# Patient Record
Sex: MALE | Race: OTHER | NOT HISPANIC OR LATINO | URBAN - METROPOLITAN AREA
[De-identification: names, ages, dates, MRNs, and addresses within clinical notes are randomized per-mention and may not be internally consistent; named-entity substitution may affect disease eponyms.]

---

## 2021-01-23 ENCOUNTER — EMERGENCY (EMERGENCY)
Facility: HOSPITAL | Age: 56
LOS: 0 days | Discharge: HOME | End: 2021-01-24
Attending: STUDENT IN AN ORGANIZED HEALTH CARE EDUCATION/TRAINING PROGRAM
Payer: COMMERCIAL

## 2021-01-23 VITALS
RESPIRATION RATE: 20 BRPM | HEIGHT: 74 IN | HEART RATE: 93 BPM | SYSTOLIC BLOOD PRESSURE: 151 MMHG | TEMPERATURE: 99 F | DIASTOLIC BLOOD PRESSURE: 93 MMHG | OXYGEN SATURATION: 100 % | WEIGHT: 225.09 LBS

## 2021-01-23 DIAGNOSIS — N10 ACUTE PYELONEPHRITIS: ICD-10-CM

## 2021-01-23 DIAGNOSIS — E11.9 TYPE 2 DIABETES MELLITUS WITHOUT COMPLICATIONS: ICD-10-CM

## 2021-01-23 DIAGNOSIS — I10 ESSENTIAL (PRIMARY) HYPERTENSION: ICD-10-CM

## 2021-01-23 DIAGNOSIS — M54.9 DORSALGIA, UNSPECIFIED: ICD-10-CM

## 2021-01-23 PROCEDURE — 93010 ELECTROCARDIOGRAM REPORT: CPT

## 2021-01-23 PROCEDURE — 99285 EMERGENCY DEPT VISIT HI MDM: CPT

## 2021-01-23 NOTE — ED PROVIDER NOTE - NSFOLLOWUPINSTRUCTIONS_ED_ALL_ED_FT
Urinary Tract Infection    A urinary tract infection (UTI) is an infection of any part of the urinary tract, which includes the kidneys, ureters, bladder, and urethra. Risk factors include ignoring your need to urinate, wiping back to front if female, being an uncircumcised male, and having diabetes or a weak immune system. Symptoms include frequent urination, pain or burning with urination, foul smelling urine, cloudy urine, pain in the lower abdomen, blood in the urine, and fever. If you were prescribed an antibiotic medicine, take it as told by your health care provider. Do not stop taking the antibiotic even if you start to feel better.    SEEK IMMEDIATE MEDICAL CARE IF YOU HAVE ANY OF THE FOLLOWING SYMPTOMS: severe back or abdominal pain, fever, inability to keep fluids or medicine down, dizziness/lightheadedness, or a change in mental status.     Take acetaminophen 650 mg orally every 6-8 hours for pain control as needed. Please do not exceed 4,000 mg of acetaminophen during a 24 hours period. Acetaminophen can be found in many over-the-counter cold medications as well as opioid medications that may be given for pain.    Take ibuprofen (also known as MOTRIN or ADVIL) 400 mg orally every 6-8 hours for pain control as needed with food to avoid an upset stomach. Ibuprofen can be found in many over-the-counter medications. Please do not take ibuprofen if you have a bleeding disorder, stomach or gastrointestinal ulcer, or liver disease.    If needed, you can alternate these medications so that you can take one medication every 3 hours. For example, at noon take ibuprofen, then at 3PM take acetaminophen, then at 6PM take ibuprofen.     Please fill the prescription of the antibiotics and take as directed. Please finish the entire course of the medication as prescribed. Do not use any alcohol or grapefruit juice with any antibiotics.     Rest, drink plenty of fluids.  Advance activity as tolerated.  Continue all previously prescribed medications as directed.  Follow up with your PMD 2-3 days and bring copies of your results.

## 2021-01-23 NOTE — ED PROVIDER NOTE - CARE PROVIDER_API CALL
Luís Mackay)  Urology  [address] [facility], [address] [place] [address]  Phone: [phone]  Fax: [phone]  Follow Up Time:

## 2021-01-23 NOTE — ED PROVIDER NOTE - PATIENT PORTAL LINK FT
You can access the FollowMyHealth Patient Portal offered by [facility] by registering at the following website: [email or web address]. By joining [facility]’s FollowMyHealth portal, you will also be able to view your health information using other applications (apps) compatible with our system.

## 2021-01-23 NOTE — ED PROVIDER NOTE - OBJECTIVE STATEMENT
Contrary to Triage note, patient's EKG in the ED was NSR as well as outpatient urgent care EKGs reviewed.    55M PMHX of DM, HTN presenting with right flank pain x few days. Contrary to Triage note, patient's EKG in the ED was NSR as well as outpatient urgent care EKGs reviewed.    55M PMHX of DM, HTN presenting with right flank pain x few days. Was sent by urgent care for "abnormal EKG", reviewed outpatient EKGs which was NSR. In the ED, patient with unremarkable EKG. Describes right sided flank pain, mild, achy, nonradiating, that is intermittent in duration. a/w urinary frequency. Denies any chest pain,, shortness of breath,, abdominal pain, fever/chills, nausea/vomiting,, diarrhea/melena/constipation, trauma, falls, hematuria, dysuria, known hx of renal stones.

## 2021-01-23 NOTE — ED ADULT TRIAGE NOTE - CHIEF COMPLAINT QUOTE
Pt biba from urgent care for abnormal EKG. PT C/O frequent urination and R lower back pain x today. h/o DM, HTN, hypothyroidism
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
## 2021-01-23 NOTE — ED PROVIDER NOTE - PROGRESS NOTE DETAILS
The patient is hemodynamically stable, clinically well-appearing, mentating well, and ambulatory for discharge home. CT showing cystitis/pyelo, but UA only showing blood but now LE/nitrates or WBC. Labs showing normal WBC, elevated glucose 334, mild hypokalemia of 3.2, normal HCO3 25 and no gap. Treated w/ ceftriaxone and rx cefpodoxime for home. discussed with patient about return precautions. verbalizes understanding.

## 2021-01-23 NOTE — ED PROVIDER NOTE - PHYSICAL EXAMINATION
VITAL SIGNS: I have reviewed nursing notes and confirm.   GEN: Well-developed; well-nourished; in no acute distress. Speaking full sentences.  SKIN: Warm, pink, no rash, no diaphoresis, no cyanosis, well perfused.   HEAD: Normocephalic; atraumatic. No scalp lacerations, no abrasions.  NECK: Supple; non tender.   EYES: Pupils 3mm equal, round, reactive to light and accomodation, conjunctiva and sclera clear. Extra-ocular movements intact bilaterally.  ENT: No nasal discharge; airway clear. Trachea is midline. ORAL: No oropharyngeal exudates or erythema. Normal dentition.  CV: Regular rate and rhythm. S1, S2 normal; no murmurs, gallops, or rubs. No lower extremity pitting edema bilaterally. Capillary refill < 2 seconds throughout. Distal pulses intact 2+ throughout.  RESP: CTA bilaterally. No wheezes, rales, or rhonchi.   ABD: Normal bowel sounds, soft, non-distended, non-tender, no hepatosplenomegaly. (+) R CVA tenderness mild.   MSK: Normal range of motion and movement of all 4 extremities. No joint or muscular pain throughout. No clubbing.   BACK: No thoracolumbar midline or paravertebral tenderness. No step-offs or obvious deformities.  NEURO: Alert & oriented x 3, Grossly unremarkable. Sensory and motor intact throughout. No focal deficits. Gait: Fluid. Normal speech and coordination.   PSYCH: Cooperative, appropriate.

## 2021-01-24 VITALS
DIASTOLIC BLOOD PRESSURE: 79 MMHG | OXYGEN SATURATION: 100 % | SYSTOLIC BLOOD PRESSURE: 166 MMHG | HEART RATE: 69 BPM | RESPIRATION RATE: 20 BRPM | TEMPERATURE: 98 F

## 2021-01-24 LAB
ALBUMIN SERPL ELPH-MCNC: 3.3 G/DL — SIGNIFICANT CHANGE UP (ref 3.3–5)
ALP SERPL-CCNC: 60 U/L — SIGNIFICANT CHANGE UP (ref 40–120)
ALT FLD-CCNC: 42 U/L — SIGNIFICANT CHANGE UP (ref 12–78)
ANION GAP SERPL CALC-SCNC: 9 MMOL/L — SIGNIFICANT CHANGE UP (ref 5–17)
APPEARANCE UR: CLEAR — SIGNIFICANT CHANGE UP
AST SERPL-CCNC: 43 U/L — HIGH (ref 15–37)
BASOPHILS # BLD AUTO: 0.01 K/UL — SIGNIFICANT CHANGE UP (ref 0–0.2)
BASOPHILS NFR BLD AUTO: 0.2 % — SIGNIFICANT CHANGE UP (ref 0–2)
BILIRUB SERPL-MCNC: 0.4 MG/DL — SIGNIFICANT CHANGE UP (ref 0.2–1.2)
BILIRUB UR-MCNC: NEGATIVE — SIGNIFICANT CHANGE UP
BUN SERPL-MCNC: 11 MG/DL — SIGNIFICANT CHANGE UP (ref 7–23)
CALCIUM SERPL-MCNC: 8.4 MG/DL — LOW (ref 8.5–10.1)
CHLORIDE SERPL-SCNC: 99 MMOL/L — SIGNIFICANT CHANGE UP (ref 96–108)
CO2 SERPL-SCNC: 25 MMOL/L — SIGNIFICANT CHANGE UP (ref 22–31)
COLOR SPEC: YELLOW — SIGNIFICANT CHANGE UP
CREAT SERPL-MCNC: 1.05 MG/DL — SIGNIFICANT CHANGE UP (ref 0.5–1.3)
DIFF PNL FLD: ABNORMAL
EOSINOPHIL # BLD AUTO: 0 K/UL — SIGNIFICANT CHANGE UP (ref 0–0.5)
EOSINOPHIL NFR BLD AUTO: 0 % — SIGNIFICANT CHANGE UP (ref 0–6)
EPI CELLS # UR: SIGNIFICANT CHANGE UP
GLUCOSE SERPL-MCNC: 334 MG/DL — HIGH (ref 70–99)
GLUCOSE UR QL: 1000 MG/DL
HCT VFR BLD CALC: 38.3 % — LOW (ref 39–50)
HGB BLD-MCNC: 13.6 G/DL — SIGNIFICANT CHANGE UP (ref 13–17)
IMM GRANULOCYTES NFR BLD AUTO: 0.4 % — SIGNIFICANT CHANGE UP (ref 0–1.5)
INR BLD: 1.32 RATIO — HIGH (ref 0.88–1.16)
KETONES UR-MCNC: ABNORMAL
LEUKOCYTE ESTERASE UR-ACNC: NEGATIVE — SIGNIFICANT CHANGE UP
LYMPHOCYTES # BLD AUTO: 0.86 K/UL — LOW (ref 1–3.3)
LYMPHOCYTES # BLD AUTO: 19.1 % — SIGNIFICANT CHANGE UP (ref 13–44)
MCHC RBC-ENTMCNC: 30.6 PG — SIGNIFICANT CHANGE UP (ref 27–34)
MCHC RBC-ENTMCNC: 35.5 GM/DL — SIGNIFICANT CHANGE UP (ref 32–36)
MCV RBC AUTO: 86.1 FL — SIGNIFICANT CHANGE UP (ref 80–100)
MONOCYTES # BLD AUTO: 0.43 K/UL — SIGNIFICANT CHANGE UP (ref 0–0.9)
MONOCYTES NFR BLD AUTO: 9.6 % — SIGNIFICANT CHANGE UP (ref 2–14)
NEUTROPHILS # BLD AUTO: 3.18 K/UL — SIGNIFICANT CHANGE UP (ref 1.8–7.4)
NEUTROPHILS NFR BLD AUTO: 70.7 % — SIGNIFICANT CHANGE UP (ref 43–77)
NITRITE UR-MCNC: NEGATIVE — SIGNIFICANT CHANGE UP
NRBC # BLD: 0 /100 WBCS — SIGNIFICANT CHANGE UP (ref 0–0)
PH UR: 6.5 — SIGNIFICANT CHANGE UP (ref 5–8)
PLATELET # BLD AUTO: 187 K/UL — SIGNIFICANT CHANGE UP (ref 150–400)
POTASSIUM SERPL-MCNC: 3.2 MMOL/L — LOW (ref 3.5–5.3)
POTASSIUM SERPL-SCNC: 3.2 MMOL/L — LOW (ref 3.5–5.3)
PROT SERPL-MCNC: 7.5 GM/DL — SIGNIFICANT CHANGE UP (ref 6–8.3)
PROT UR-MCNC: 30 MG/DL
PROTHROM AB SERPL-ACNC: 15.1 SEC — HIGH (ref 10.6–13.6)
RBC # BLD: 4.45 M/UL — SIGNIFICANT CHANGE UP (ref 4.2–5.8)
RBC # FLD: 11.9 % — SIGNIFICANT CHANGE UP (ref 10.3–14.5)
RBC CASTS # UR COMP ASSIST: SIGNIFICANT CHANGE UP /HPF (ref 0–4)
SODIUM SERPL-SCNC: 133 MMOL/L — LOW (ref 135–145)
SP GR SPEC: 1.01 — SIGNIFICANT CHANGE UP (ref 1.01–1.02)
UROBILINOGEN FLD QL: NEGATIVE MG/DL — SIGNIFICANT CHANGE UP
WBC # BLD: 4.5 K/UL — SIGNIFICANT CHANGE UP (ref 3.8–10.5)
WBC # FLD AUTO: 4.5 K/UL — SIGNIFICANT CHANGE UP (ref 3.8–10.5)
WBC UR QL: SIGNIFICANT CHANGE UP

## 2021-01-24 PROCEDURE — 74176 CT ABD & PELVIS W/O CONTRAST: CPT | Mod: 26,MA

## 2021-01-24 RX ORDER — KETOROLAC TROMETHAMINE 30 MG/ML
15 SYRINGE (ML) INJECTION ONCE
Refills: 0 | Status: DISCONTINUED | OUTPATIENT
Start: 2021-01-24 | End: 2021-01-24

## 2021-01-24 RX ORDER — CEFPODOXIME PROXETIL 100 MG
1 TABLET ORAL
Qty: 20 | Refills: 0
Start: 2021-01-24 | End: 2021-02-02

## 2021-01-24 RX ORDER — SODIUM CHLORIDE 9 MG/ML
1000 INJECTION INTRAMUSCULAR; INTRAVENOUS; SUBCUTANEOUS ONCE
Refills: 0 | Status: COMPLETED | OUTPATIENT
Start: 2021-01-24 | End: 2021-01-24

## 2021-01-24 RX ORDER — CEFTRIAXONE 500 MG/1
1000 INJECTION, POWDER, FOR SOLUTION INTRAMUSCULAR; INTRAVENOUS ONCE
Refills: 0 | Status: COMPLETED | OUTPATIENT
Start: 2021-01-24 | End: 2021-01-24

## 2021-01-24 RX ORDER — ACETAMINOPHEN 500 MG
650 TABLET ORAL ONCE
Refills: 0 | Status: COMPLETED | OUTPATIENT
Start: 2021-01-24 | End: 2021-01-24

## 2021-01-24 RX ADMIN — Medication 15 MILLIGRAM(S): at 00:52

## 2021-01-24 RX ADMIN — CEFTRIAXONE 1000 MILLIGRAM(S): 500 INJECTION, POWDER, FOR SOLUTION INTRAMUSCULAR; INTRAVENOUS at 04:11

## 2021-01-24 RX ADMIN — Medication 650 MILLIGRAM(S): at 05:46

## 2021-01-24 RX ADMIN — CEFTRIAXONE 100 MILLIGRAM(S): 500 INJECTION, POWDER, FOR SOLUTION INTRAMUSCULAR; INTRAVENOUS at 03:41

## 2021-01-24 RX ADMIN — SODIUM CHLORIDE 1000 MILLILITER(S): 9 INJECTION INTRAMUSCULAR; INTRAVENOUS; SUBCUTANEOUS at 00:52

## 2021-01-24 NOTE — ED ADULT NURSE NOTE - OBJECTIVE STATEMENT
Pt sent over from urgent care for abnormal EKG. Pts EKG normal in ED. Pt A&OX4. Pt reports L back pain earlier today, frequent urination and dizziness. Pt denies pain currently.

## 2021-01-25 LAB
CULTURE RESULTS: SIGNIFICANT CHANGE UP
SPECIMEN SOURCE: SIGNIFICANT CHANGE UP

## 2021-04-08 PROBLEM — Z00.00 ENCOUNTER FOR PREVENTIVE HEALTH EXAMINATION: Status: ACTIVE | Noted: 2021-04-08

## 2022-08-20 ENCOUNTER — INPATIENT (INPATIENT)
Facility: HOSPITAL | Age: 57
LOS: 7 days | Discharge: HOME HEALTH | End: 2022-08-28
Attending: STUDENT IN AN ORGANIZED HEALTH CARE EDUCATION/TRAINING PROGRAM | Admitting: STUDENT IN AN ORGANIZED HEALTH CARE EDUCATION/TRAINING PROGRAM

## 2022-08-20 PROCEDURE — 99285 EMERGENCY DEPT VISIT HI MDM: CPT

## 2022-08-21 VITALS
RESPIRATION RATE: 17 BRPM | TEMPERATURE: 98 F | OXYGEN SATURATION: 100 % | HEIGHT: 74 IN | DIASTOLIC BLOOD PRESSURE: 85 MMHG | SYSTOLIC BLOOD PRESSURE: 144 MMHG | HEART RATE: 98 BPM

## 2022-08-21 DIAGNOSIS — E87.8 OTHER DISORDERS OF ELECTROLYTE AND FLUID BALANCE, NOT ELSEWHERE CLASSIFIED: ICD-10-CM

## 2022-08-21 DIAGNOSIS — Z95.1 PRESENCE OF AORTOCORONARY BYPASS GRAFT: Chronic | ICD-10-CM

## 2022-08-21 DIAGNOSIS — Z29.9 ENCOUNTER FOR PROPHYLACTIC MEASURES, UNSPECIFIED: ICD-10-CM

## 2022-08-21 DIAGNOSIS — L08.9 LOCAL INFECTION OF THE SKIN AND SUBCUTANEOUS TISSUE, UNSPECIFIED: ICD-10-CM

## 2022-08-21 DIAGNOSIS — E11.9 TYPE 2 DIABETES MELLITUS WITHOUT COMPLICATIONS: ICD-10-CM

## 2022-08-21 DIAGNOSIS — M86.172 OTHER ACUTE OSTEOMYELITIS, LEFT ANKLE AND FOOT: ICD-10-CM

## 2022-08-21 DIAGNOSIS — E78.5 HYPERLIPIDEMIA, UNSPECIFIED: ICD-10-CM

## 2022-08-21 DIAGNOSIS — Z89.422 ACQUIRED ABSENCE OF OTHER LEFT TOE(S): Chronic | ICD-10-CM

## 2022-08-21 DIAGNOSIS — Z95.1 PRESENCE OF AORTOCORONARY BYPASS GRAFT: ICD-10-CM

## 2022-08-21 DIAGNOSIS — M86.10 OTHER ACUTE OSTEOMYELITIS, UNSPECIFIED SITE: ICD-10-CM

## 2022-08-21 DIAGNOSIS — I10 ESSENTIAL (PRIMARY) HYPERTENSION: ICD-10-CM

## 2022-08-21 DIAGNOSIS — E03.9 HYPOTHYROIDISM, UNSPECIFIED: ICD-10-CM

## 2022-08-21 LAB
ANION GAP SERPL CALC-SCNC: 13 MMOL/L — SIGNIFICANT CHANGE UP (ref 7–14)
BASOPHILS # BLD AUTO: 0.03 K/UL — SIGNIFICANT CHANGE UP (ref 0–0.2)
BASOPHILS NFR BLD AUTO: 0.3 % — SIGNIFICANT CHANGE UP (ref 0–2)
BUN SERPL-MCNC: 12 MG/DL — SIGNIFICANT CHANGE UP (ref 7–23)
CALCIUM SERPL-MCNC: 9.5 MG/DL — SIGNIFICANT CHANGE UP (ref 8.4–10.5)
CHLORIDE SERPL-SCNC: 102 MMOL/L — SIGNIFICANT CHANGE UP (ref 98–107)
CO2 SERPL-SCNC: 25 MMOL/L — SIGNIFICANT CHANGE UP (ref 22–31)
CREAT SERPL-MCNC: 0.69 MG/DL — SIGNIFICANT CHANGE UP (ref 0.5–1.3)
CRP SERPL-MCNC: 86 MG/L — HIGH
EGFR: 107 ML/MIN/1.73M2 — SIGNIFICANT CHANGE UP
EOSINOPHIL # BLD AUTO: 0.16 K/UL — SIGNIFICANT CHANGE UP (ref 0–0.5)
EOSINOPHIL NFR BLD AUTO: 1.6 % — SIGNIFICANT CHANGE UP (ref 0–6)
ERYTHROCYTE [SEDIMENTATION RATE] IN BLOOD: 90 MM/HR — HIGH (ref 1–15)
FLUAV AG NPH QL: SIGNIFICANT CHANGE UP
FLUBV AG NPH QL: SIGNIFICANT CHANGE UP
GLUCOSE SERPL-MCNC: 149 MG/DL — HIGH (ref 70–99)
HCT VFR BLD CALC: 34.2 % — LOW (ref 39–50)
HGB BLD-MCNC: 11.2 G/DL — LOW (ref 13–17)
IANC: 7.1 K/UL — SIGNIFICANT CHANGE UP (ref 1.8–7.4)
IMM GRANULOCYTES NFR BLD AUTO: 0.3 % — SIGNIFICANT CHANGE UP (ref 0–1.5)
LYMPHOCYTES # BLD AUTO: 1.86 K/UL — SIGNIFICANT CHANGE UP (ref 1–3.3)
LYMPHOCYTES # BLD AUTO: 18.1 % — SIGNIFICANT CHANGE UP (ref 13–44)
MAGNESIUM SERPL-MCNC: 1.5 MG/DL — LOW (ref 1.6–2.6)
MCHC RBC-ENTMCNC: 29.8 PG — SIGNIFICANT CHANGE UP (ref 27–34)
MCHC RBC-ENTMCNC: 32.7 GM/DL — SIGNIFICANT CHANGE UP (ref 32–36)
MCV RBC AUTO: 91 FL — SIGNIFICANT CHANGE UP (ref 80–100)
MONOCYTES # BLD AUTO: 1.1 K/UL — HIGH (ref 0–0.9)
MONOCYTES NFR BLD AUTO: 10.7 % — SIGNIFICANT CHANGE UP (ref 2–14)
NEUTROPHILS # BLD AUTO: 7.1 K/UL — SIGNIFICANT CHANGE UP (ref 1.8–7.4)
NEUTROPHILS NFR BLD AUTO: 69 % — SIGNIFICANT CHANGE UP (ref 43–77)
NRBC # BLD: 0 /100 WBCS — SIGNIFICANT CHANGE UP (ref 0–0)
NRBC # FLD: 0 K/UL — SIGNIFICANT CHANGE UP (ref 0–0)
PHOSPHATE SERPL-MCNC: 3.7 MG/DL — SIGNIFICANT CHANGE UP (ref 2.5–4.5)
PLATELET # BLD AUTO: 329 K/UL — SIGNIFICANT CHANGE UP (ref 150–400)
POTASSIUM SERPL-MCNC: 3.4 MMOL/L — LOW (ref 3.5–5.3)
POTASSIUM SERPL-SCNC: 3.4 MMOL/L — LOW (ref 3.5–5.3)
RBC # BLD: 3.76 M/UL — LOW (ref 4.2–5.8)
RBC # FLD: 13.6 % — SIGNIFICANT CHANGE UP (ref 10.3–14.5)
RSV RNA NPH QL NAA+NON-PROBE: SIGNIFICANT CHANGE UP
SARS-COV-2 RNA SPEC QL NAA+PROBE: SIGNIFICANT CHANGE UP
SODIUM SERPL-SCNC: 140 MMOL/L — SIGNIFICANT CHANGE UP (ref 135–145)
WBC # BLD: 10.28 K/UL — SIGNIFICANT CHANGE UP (ref 3.8–10.5)
WBC # FLD AUTO: 10.28 K/UL — SIGNIFICANT CHANGE UP (ref 3.8–10.5)

## 2022-08-21 PROCEDURE — 99223 1ST HOSP IP/OBS HIGH 75: CPT

## 2022-08-21 PROCEDURE — 73630 X-RAY EXAM OF FOOT: CPT | Mod: 26,LT

## 2022-08-21 RX ORDER — FERROUS SULFATE 325(65) MG
325 TABLET ORAL DAILY
Refills: 0 | Status: DISCONTINUED | OUTPATIENT
Start: 2022-08-21 | End: 2022-08-28

## 2022-08-21 RX ORDER — METRONIDAZOLE 500 MG
500 TABLET ORAL EVERY 8 HOURS
Refills: 0 | Status: DISCONTINUED | OUTPATIENT
Start: 2022-08-21 | End: 2022-08-22

## 2022-08-21 RX ORDER — SEMAGLUTIDE 0.68 MG/ML
0 INJECTION, SOLUTION SUBCUTANEOUS
Qty: 0 | Refills: 0 | DISCHARGE

## 2022-08-21 RX ORDER — IBUPROFEN 200 MG
600 TABLET ORAL ONCE
Refills: 0 | Status: COMPLETED | OUTPATIENT
Start: 2022-08-21 | End: 2022-08-21

## 2022-08-21 RX ORDER — SENNA PLUS 8.6 MG/1
20 TABLET ORAL
Qty: 0 | Refills: 0 | DISCHARGE

## 2022-08-21 RX ORDER — LEVOTHYROXINE SODIUM 125 MCG
100 TABLET ORAL DAILY
Refills: 0 | Status: DISCONTINUED | OUTPATIENT
Start: 2022-08-21 | End: 2022-08-28

## 2022-08-21 RX ORDER — AMLODIPINE BESYLATE 2.5 MG/1
5 TABLET ORAL DAILY
Refills: 0 | Status: DISCONTINUED | OUTPATIENT
Start: 2022-08-21 | End: 2022-08-28

## 2022-08-21 RX ORDER — FOLIC ACID 0.8 MG
1 TABLET ORAL DAILY
Refills: 0 | Status: DISCONTINUED | OUTPATIENT
Start: 2022-08-21 | End: 2022-08-28

## 2022-08-21 RX ORDER — DEXTROSE 50 % IN WATER 50 %
25 SYRINGE (ML) INTRAVENOUS ONCE
Refills: 0 | Status: DISCONTINUED | OUTPATIENT
Start: 2022-08-21 | End: 2022-08-28

## 2022-08-21 RX ORDER — INSULIN LISPRO 100/ML
VIAL (ML) SUBCUTANEOUS
Refills: 0 | Status: DISCONTINUED | OUTPATIENT
Start: 2022-08-21 | End: 2022-08-21

## 2022-08-21 RX ORDER — FOLIC ACID 0.8 MG
1 TABLET ORAL
Qty: 0 | Refills: 0 | DISCHARGE

## 2022-08-21 RX ORDER — INSULIN LISPRO 100/ML
VIAL (ML) SUBCUTANEOUS
Refills: 0 | Status: DISCONTINUED | OUTPATIENT
Start: 2022-08-21 | End: 2022-08-28

## 2022-08-21 RX ORDER — DEXTROSE 50 % IN WATER 50 %
25 SYRINGE (ML) INTRAVENOUS ONCE
Refills: 0 | Status: DISCONTINUED | OUTPATIENT
Start: 2022-08-21 | End: 2022-08-21

## 2022-08-21 RX ORDER — FOLIC ACID 0.8 MG
0 TABLET ORAL
Qty: 0 | Refills: 0 | DISCHARGE

## 2022-08-21 RX ORDER — AMLODIPINE BESYLATE 2.5 MG/1
1 TABLET ORAL
Qty: 0 | Refills: 0 | DISCHARGE

## 2022-08-21 RX ORDER — LEVOTHYROXINE SODIUM 125 MCG
1 TABLET ORAL
Qty: 0 | Refills: 0 | DISCHARGE

## 2022-08-21 RX ORDER — SENNA PLUS 8.6 MG/1
2 TABLET ORAL AT BEDTIME
Refills: 0 | Status: DISCONTINUED | OUTPATIENT
Start: 2022-08-21 | End: 2022-08-28

## 2022-08-21 RX ORDER — INSULIN LISPRO 100/ML
VIAL (ML) SUBCUTANEOUS AT BEDTIME
Refills: 0 | Status: DISCONTINUED | OUTPATIENT
Start: 2022-08-21 | End: 2022-08-28

## 2022-08-21 RX ORDER — MAGNESIUM SULFATE 500 MG/ML
1 VIAL (ML) INJECTION ONCE
Refills: 0 | Status: COMPLETED | OUTPATIENT
Start: 2022-08-21 | End: 2022-08-21

## 2022-08-21 RX ORDER — SODIUM CHLORIDE 9 MG/ML
1000 INJECTION, SOLUTION INTRAVENOUS
Refills: 0 | Status: DISCONTINUED | OUTPATIENT
Start: 2022-08-21 | End: 2022-08-28

## 2022-08-21 RX ORDER — CEFEPIME 1 G/1
2000 INJECTION, POWDER, FOR SOLUTION INTRAMUSCULAR; INTRAVENOUS ONCE
Refills: 0 | Status: COMPLETED | OUTPATIENT
Start: 2022-08-21 | End: 2022-08-21

## 2022-08-21 RX ORDER — FERROUS SULFATE 325(65) MG
1 TABLET ORAL
Qty: 0 | Refills: 0 | DISCHARGE

## 2022-08-21 RX ORDER — DEXTROSE 50 % IN WATER 50 %
15 SYRINGE (ML) INTRAVENOUS ONCE
Refills: 0 | Status: DISCONTINUED | OUTPATIENT
Start: 2022-08-21 | End: 2022-08-21

## 2022-08-21 RX ORDER — ASPIRIN/CALCIUM CARB/MAGNESIUM 324 MG
81 TABLET ORAL DAILY
Refills: 0 | Status: DISCONTINUED | OUTPATIENT
Start: 2022-08-21 | End: 2022-08-28

## 2022-08-21 RX ORDER — ACETAMINOPHEN 500 MG
650 TABLET ORAL EVERY 6 HOURS
Refills: 0 | Status: DISCONTINUED | OUTPATIENT
Start: 2022-08-21 | End: 2022-08-24

## 2022-08-21 RX ORDER — VANCOMYCIN HCL 1 G
1000 VIAL (EA) INTRAVENOUS ONCE
Refills: 0 | Status: COMPLETED | OUTPATIENT
Start: 2022-08-21 | End: 2022-08-21

## 2022-08-21 RX ORDER — SODIUM CHLORIDE 9 MG/ML
1000 INJECTION, SOLUTION INTRAVENOUS
Refills: 0 | Status: DISCONTINUED | OUTPATIENT
Start: 2022-08-21 | End: 2022-08-21

## 2022-08-21 RX ORDER — POTASSIUM CHLORIDE 20 MEQ
0 PACKET (EA) ORAL
Qty: 0 | Refills: 0 | DISCHARGE

## 2022-08-21 RX ORDER — METFORMIN HYDROCHLORIDE 850 MG/1
1 TABLET ORAL
Qty: 0 | Refills: 0 | DISCHARGE

## 2022-08-21 RX ORDER — CEFEPIME 1 G/1
2000 INJECTION, POWDER, FOR SOLUTION INTRAMUSCULAR; INTRAVENOUS EVERY 12 HOURS
Refills: 0 | Status: DISCONTINUED | OUTPATIENT
Start: 2022-08-21 | End: 2022-08-23

## 2022-08-21 RX ORDER — POTASSIUM CHLORIDE 20 MEQ
40 PACKET (EA) ORAL ONCE
Refills: 0 | Status: COMPLETED | OUTPATIENT
Start: 2022-08-21 | End: 2022-08-21

## 2022-08-21 RX ORDER — GLUCAGON INJECTION, SOLUTION 0.5 MG/.1ML
1 INJECTION, SOLUTION SUBCUTANEOUS ONCE
Refills: 0 | Status: DISCONTINUED | OUTPATIENT
Start: 2022-08-21 | End: 2022-08-21

## 2022-08-21 RX ORDER — ASPIRIN/CALCIUM CARB/MAGNESIUM 324 MG
0 TABLET ORAL
Qty: 0 | Refills: 0 | DISCHARGE

## 2022-08-21 RX ORDER — LOSARTAN POTASSIUM 100 MG/1
25 TABLET, FILM COATED ORAL DAILY
Refills: 0 | Status: DISCONTINUED | OUTPATIENT
Start: 2022-08-21 | End: 2022-08-28

## 2022-08-21 RX ORDER — ATORVASTATIN CALCIUM 80 MG/1
80 TABLET, FILM COATED ORAL AT BEDTIME
Refills: 0 | Status: DISCONTINUED | OUTPATIENT
Start: 2022-08-21 | End: 2022-08-28

## 2022-08-21 RX ORDER — INSULIN LISPRO 100/ML
7 VIAL (ML) SUBCUTANEOUS
Refills: 0 | Status: DISCONTINUED | OUTPATIENT
Start: 2022-08-21 | End: 2022-08-23

## 2022-08-21 RX ORDER — DEXTROSE 50 % IN WATER 50 %
15 SYRINGE (ML) INTRAVENOUS ONCE
Refills: 0 | Status: DISCONTINUED | OUTPATIENT
Start: 2022-08-21 | End: 2022-08-28

## 2022-08-21 RX ORDER — INSULIN GLARGINE 100 [IU]/ML
27 INJECTION, SOLUTION SUBCUTANEOUS AT BEDTIME
Refills: 0 | Status: DISCONTINUED | OUTPATIENT
Start: 2022-08-21 | End: 2022-08-22

## 2022-08-21 RX ORDER — INSULIN LISPRO 100/ML
VIAL (ML) SUBCUTANEOUS AT BEDTIME
Refills: 0 | Status: DISCONTINUED | OUTPATIENT
Start: 2022-08-21 | End: 2022-08-21

## 2022-08-21 RX ORDER — DEXTROSE 50 % IN WATER 50 %
12.5 SYRINGE (ML) INTRAVENOUS ONCE
Refills: 0 | Status: DISCONTINUED | OUTPATIENT
Start: 2022-08-21 | End: 2022-08-28

## 2022-08-21 RX ORDER — GLUCAGON INJECTION, SOLUTION 0.5 MG/.1ML
1 INJECTION, SOLUTION SUBCUTANEOUS ONCE
Refills: 0 | Status: DISCONTINUED | OUTPATIENT
Start: 2022-08-21 | End: 2022-08-28

## 2022-08-21 RX ORDER — SENNA PLUS 8.6 MG/1
0 TABLET ORAL
Qty: 0 | Refills: 0 | DISCHARGE

## 2022-08-21 RX ORDER — CARVEDILOL PHOSPHATE 80 MG/1
12.5 CAPSULE, EXTENDED RELEASE ORAL EVERY 12 HOURS
Refills: 0 | Status: DISCONTINUED | OUTPATIENT
Start: 2022-08-21 | End: 2022-08-28

## 2022-08-21 RX ORDER — FERROUS SULFATE 325(65) MG
0 TABLET ORAL
Qty: 0 | Refills: 0 | DISCHARGE

## 2022-08-21 RX ORDER — FUROSEMIDE 40 MG
40 TABLET ORAL DAILY
Refills: 0 | Status: DISCONTINUED | OUTPATIENT
Start: 2022-08-21 | End: 2022-08-28

## 2022-08-21 RX ORDER — HEPARIN SODIUM 5000 [USP'U]/ML
5000 INJECTION INTRAVENOUS; SUBCUTANEOUS EVERY 8 HOURS
Refills: 0 | Status: DISCONTINUED | OUTPATIENT
Start: 2022-08-21 | End: 2022-08-24

## 2022-08-21 RX ORDER — DEXTROSE 50 % IN WATER 50 %
12.5 SYRINGE (ML) INTRAVENOUS ONCE
Refills: 0 | Status: DISCONTINUED | OUTPATIENT
Start: 2022-08-21 | End: 2022-08-21

## 2022-08-21 RX ORDER — GABAPENTIN 400 MG/1
100 CAPSULE ORAL THREE TIMES A DAY
Refills: 0 | Status: DISCONTINUED | OUTPATIENT
Start: 2022-08-21 | End: 2022-08-28

## 2022-08-21 RX ORDER — VANCOMYCIN HCL 1 G
1250 VIAL (EA) INTRAVENOUS EVERY 12 HOURS
Refills: 0 | Status: DISCONTINUED | OUTPATIENT
Start: 2022-08-21 | End: 2022-08-23

## 2022-08-21 RX ADMIN — SENNA PLUS 2 TABLET(S): 8.6 TABLET ORAL at 22:13

## 2022-08-21 RX ADMIN — INSULIN GLARGINE 27 UNIT(S): 100 INJECTION, SOLUTION SUBCUTANEOUS at 23:01

## 2022-08-21 RX ADMIN — Medication 100 MILLIGRAM(S): at 23:15

## 2022-08-21 RX ADMIN — Medication 81 MILLIGRAM(S): at 16:07

## 2022-08-21 RX ADMIN — LOSARTAN POTASSIUM 25 MILLIGRAM(S): 100 TABLET, FILM COATED ORAL at 19:35

## 2022-08-21 RX ADMIN — GABAPENTIN 100 MILLIGRAM(S): 400 CAPSULE ORAL at 22:14

## 2022-08-21 RX ADMIN — Medication 325 MILLIGRAM(S): at 16:07

## 2022-08-21 RX ADMIN — CARVEDILOL PHOSPHATE 12.5 MILLIGRAM(S): 80 CAPSULE, EXTENDED RELEASE ORAL at 19:35

## 2022-08-21 RX ADMIN — Medication 40 MILLIGRAM(S): at 16:08

## 2022-08-21 RX ADMIN — Medication 1 MILLIGRAM(S): at 19:35

## 2022-08-21 RX ADMIN — Medication 100 GRAM(S): at 13:25

## 2022-08-21 RX ADMIN — ATORVASTATIN CALCIUM 80 MILLIGRAM(S): 80 TABLET, FILM COATED ORAL at 22:14

## 2022-08-21 RX ADMIN — CEFEPIME 100 MILLIGRAM(S): 1 INJECTION, POWDER, FOR SOLUTION INTRAMUSCULAR; INTRAVENOUS at 10:32

## 2022-08-21 RX ADMIN — Medication 600 MILLIGRAM(S): at 23:01

## 2022-08-21 RX ADMIN — GABAPENTIN 100 MILLIGRAM(S): 400 CAPSULE ORAL at 14:47

## 2022-08-21 RX ADMIN — Medication 100 MILLIGRAM(S): at 09:10

## 2022-08-21 RX ADMIN — HEPARIN SODIUM 5000 UNIT(S): 5000 INJECTION INTRAVENOUS; SUBCUTANEOUS at 22:14

## 2022-08-21 RX ADMIN — Medication 7 UNIT(S): at 17:27

## 2022-08-21 RX ADMIN — Medication 100 MICROGRAM(S): at 16:07

## 2022-08-21 RX ADMIN — AMLODIPINE BESYLATE 5 MILLIGRAM(S): 2.5 TABLET ORAL at 16:07

## 2022-08-21 RX ADMIN — Medication 250 MILLIGRAM(S): at 10:33

## 2022-08-21 RX ADMIN — CEFEPIME 100 MILLIGRAM(S): 1 INJECTION, POWDER, FOR SOLUTION INTRAMUSCULAR; INTRAVENOUS at 22:13

## 2022-08-21 RX ADMIN — Medication 600 MILLIGRAM(S): at 05:17

## 2022-08-21 RX ADMIN — Medication 40 MILLIEQUIVALENT(S): at 13:25

## 2022-08-21 RX ADMIN — Medication 1: at 17:27

## 2022-08-21 NOTE — H&P ADULT - PROBLEM SELECTOR PROBLEM 7
Prophylactic measure Arava Counseling:  Patient counseled regarding adverse effects of Arava including but not limited to nausea, vomiting, abnormalities in liver function tests. Patients may develop mouth sores, rash, diarrhea, and abnormalities in blood counts. The patient understands that monitoring is required including LFTs and blood counts.  There is a rare possibility of scarring of the liver and lung problems that can occur when taking methotrexate. Persistent nausea, loss of appetite, pale stools, dark urine, cough, and shortness of breath should be reported immediately. Patient advised to discontinue Arava treatment and consult with a physician prior to attempting conception. The patient will have to undergo a treatment to eliminate Arava from the body prior to conception. HLD (hyperlipidemia)

## 2022-08-21 NOTE — H&P ADULT - PROBLEM SELECTOR PLAN 4
CABG in July 2022. ECG today shows some t-wave inversions, possibly representing prior ischemic event. CABG in July 2022. ECG today shows some t-wave inversions, possibly representing prior ischemic event.  - continue aspirin 81

## 2022-08-21 NOTE — ED PROVIDER NOTE - OBJECTIVE STATEMENT
56yo M with PMHX DM, HTN, CABG x 4, L 5th toe amputation in March 2022 due to ?gangrene, p/w new 4 days of pain to amputation site and thick white discharge.  No trauma, falls, calf pain, sob, chest pains, fevers, or recent antibx use. Says visiting wound car RNs have been assisting him in dressing area since surgery.

## 2022-08-21 NOTE — H&P ADULT - PROBLEM SELECTOR PLAN 2
On 27 units long-acting insulin once and 7 units short acting insulin with meals at home, per patient. Per pharmacy, pt rx Tresiba 18 units, Humalog 6-8 units with meals, and Ozempic. On Metformin 500 mg BID. 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 On gabapentin 100 mg TID.   - ISS inpatient On 27 units long-acting insulin once and 7 units short acting insulin with meals at home, per patient. Per pharmacy, pt rx Tresiba 18 units, Humalog 6-8 units with meals, and Ozempic. On Metformin 500 mg BID. 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 On gabapentin 100 mg TID.   - 27 units long-acting, 7 units pre-meal bolus along with ISS  - continue gabapentin 100 mg TID

## 2022-08-21 NOTE — ED PROVIDER NOTE - IV ALTEPLASE INCLUSION HIDDEN
ACTIVITY: Rest and take it easy for the first 24 hours.  A responsible adult is recommended to remain with you during that time.  It is normal to feel sleepy.  We encourage you to not do anything that requires balance, judgment or coordination.    MILD FLU-LIKE SYMPTOMS ARE NORMAL. YOU MAY EXPERIENCE GENERALIZED MUSCLE ACHES, THROAT IRRITATION, HEADACHE AND/OR SOME NAUSEA.    FOR 24 HOURS DO NOT:  Drive, operate machinery or run household appliances.  Drink beer or alcoholic beverages.   Make important decisions or sign legal documents.    DISCHARGE INSTRUCTIONS FOLLOWING   URETEROSCOPY AND LASER LITHOTRIPSY       DIET:   You can resume your regular diet. We encourage you to eat well-balanced and nutritious meals.       ACTIVITY:   Please restrain from strenuous activity or heavy lifting (more than 20 pounds) for the next week.  Please walk daily as much as tolerated, making exercise a part of your daily life. Do not drive while using narcotics for pain control. You may resumes showering and bathing without any restrictions.       WOUND CARE:   1. You have no dressing or open wounds to manage.   2. You do have a internal ureteral stent on strings.  Please do not pull these strings as they will be used at your follow up visit to remove the stent.     MEDICATIONS:   1. Please use an over the counter antiinflammatory (ie Ibuprofen, naproxen, Ketoralac) and/or Tylenol for pain control as needed.   2. You may take 3 days of OTC azo for numbing the bladder and burning with urination.   3. If you have severe pain refractory to Ibuprofen you may use norco for pain control as well as prescribed. If taking a narcotic, please use a stool softener like miralax or colace to prevent constipation.   3. Please use flomax daily as prescribed while you have a stent in place to lessen stone pain.   4. If you are using aspirin, Plavix, or coumadin, please don't restart these medications until two days after your discharge if you are  not having large amounts of blood in the urine.     FOLLOW-UP:   Can remove stent in 7days at home.     We recommend an ultrasound at 6-8 weeks to confirm the swelling (hydronephrosis) of the kidney(s) has resolved.     We will plan to discuss stone metabolic work-up at your follow-up in 6 to 8 weeks, this includes urine and blood tests that can help us determine why you form kidney stones.     WARNING SIGNS:   Fever greater than 101 degrees Fahrenheit, chills, nausea or vomiting, Large amount of clots in urine that make it difficult to urinate or for urine to drain from rosado, increasing pain, or abdominal swelling. If you are experiencing these symptoms, call the Urology Clinic or go to your local PCP or emergency room.     It is normal to see blood in your urine for up to 2 weeks even from surgery. The urine may clear up entirely, and then turn bloody again a few days later depending on your activity level; do not be alarmed. However, if you experience severe pain or tenderness, have a lot of increased bleeding, or find that you are unable to urinate because of large clots, please notify your doctor immediately      DIET: To avoid nausea, slowly advance diet as tolerated, avoiding spicy or greasy foods for the first day.  Add more substantial food to your diet according to your physician's instructions.     INCREASE FLUIDS AND FIBER TO AVOID CONSTIPATION.         FOLLOW-UP APPOINTMENT:  A follow-up appointment should be arranged with your doctor, call to schedule [phone].    You should CALL YOUR PHYSICIAN if you develop:  Fever greater than 101 degrees F.  Pain not relieved by medication, or persistent nausea or vomiting.  Excessive bleeding (blood soaking through dressing) or unexpected drainage from the wound.  Extreme redness or swelling around the incision site, drainage of pus or foul smelling drainage.  Inability to urinate or empty your bladder within 8 hours.  Problems with breathing or chest  pain.    You should call 911 if you develop problems with breathing or chest pain.  If you are unable to contact your doctor or surgical center, you should go to the nearest emergency room or urgent care center.  Physician's telephone #: [phone]    If any questions arise, call your doctor.  If your doctor is not available, please feel free to call the Surgical Center at [phone].     A registered nurse may call you a few days after your surgery to see how you are doing after your procedure.    MEDICATIONS: Resume taking daily medication.  Take prescribed pain medication with food.  If no medication is prescribed, you may take non-aspirin pain medication if needed.  PAIN MEDICATION CAN BE VERY CONSTIPATING.  Take a stool softener or laxative such as senokot, pericolace, or milk of magnesia if needed.    Prescription given for N/A.      Last pain medication (Oxycodone 5 mg) given at 2:00 PM.    If your physician has prescribed pain medication that includes Acetaminophen (Tylenol), do not take additional Acetaminophen (Tylenol) while taking the prescribed medication.    Depression / Suicide Risk    As you are discharged from this [facility] facility, it is important to learn how to keep safe from harming yourself.    Recognize the warning signs:  · Abrupt changes in personality, positive or negative- including increase in energy   · Giving away possessions  · Change in eating patterns- significant weight changes-  positive or negative  · Change in sleeping patterns- unable to sleep or sleeping all the time   · Unwillingness or inability to communicate  · Depression  · Unusual sadness, discouragement and loneliness  · Talk of wanting to die  · Neglect of personal appearance   · Rebelliousness- reckless behavior  · Withdrawal from people/activities they love  · Confusion- inability to concentrate     If you or a loved one observes any of these behaviors or has concerns about self-harm, here's what you can  do:  · Talk about it- your feelings and reasons for harming yourself  · Remove any means that you might use to hurt yourself (examples: pills, rope, extension cords, firearm)  · Get professional help from the community (Mental Health, Substance Abuse, psychological counseling)  · Do not be alone:Call your Safe Contact- someone whom you trust who will be there for you.  · Call your local CRISIS HOTLINE 575-2472 or 895-254-5158  · Call your local Children's Mobile Crisis Response Team [place] (292) 381-2040 or www.reBounces  · Call the toll free National Suicide Prevention Hotlines   · National Suicide Prevention Lifeline 821-151-LQLM (4787)  · National Hope Line Network 800-SUICIDE (025-7105)   show

## 2022-08-21 NOTE — H&P ADULT - PROBLEM SELECTOR PLAN 1
Subacute pain and discharge from amputation site of L small toe. Probe to bone on exam on podiatric exam. XR c/f osteomyelitis of left fourth metatarsal head and proximal phalanx.  - cefepime 2g IVPB, clindamycin 900 mg IVPB, vancomycin 1g IVPB in ED.    - MRI to further assess for osteomyelitis  - f/u Podiatry recs  - f/u wound culture Subacute pain and discharge from amputation site of L small toe. Probe to bone on exam on podiatric exam. XR c/f osteomyelitis of left fourth metatarsal head and proximal phalanx. CRP 86, ESR 80. WBC 10.28.   - cefepime 2g IVPB, clindamycin 900 mg IVPB, vancomycin 1g IVPB in ED.   - MRI to further assess for osteomyelitis  - f/u Podiatry recs, possible bone biopsy to determine future abx coverage.   - f/u wound culture Subacute pain and discharge from amputation site of L small toe. Probe to bone on exam on podiatric exam. XR c/f osteomyelitis of left fourth metatarsal head and proximal phalanx. CRP 86, ESR 80. WBC 10.28. cefepime 2g IVPB, clindamycin 900 mg IVPB, vancomycin 1g IVPB given in ED.  - continue vancomycin, cefepime, and flagyl for empiric treatment of osteomyelitis in a diabetic patient  - MRI to further assess for osteomyelitis per ID  - f/u Podiatry recs   - f/u wound culture

## 2022-08-21 NOTE — H&P ADULT - NSHPPHYSICALEXAM_GEN_ALL_CORE
VITALS:   T(C): 36.8 (08-21-22 @ 01:16), Max: 36.8 (08-21-22 @ 01:16)  HR: 98 (08-21-22 @ 01:16) (98 - 98)  BP: 147/82 (08-21-22 @ 01:16) (144/85 - 147/82)  RR: 17 (08-21-22 @ 01:16) (17 - 17)  SpO2: 100% (08-21-22 @ 01:16) (100% - 100%)    GENERAL: NAD, lying in bed comfortably  HEAD:  Atraumatic, normocephalic  EYES: EOMI, PERRLA, conjunctiva and sclera clear. Left pupillary shape abnormal (patient reports prior eye surgery but does not recall details)  ENT: Moist mucous membranes  NECK: Supple, no JVD  HEART: Regular rate and rhythm, no murmurs, rubs, or gallops  LUNGS: Unlabored respirations.  Clear to auscultation bilaterally, no crackles, wheezing, or rhonchi  ABDOMEN: Soft, nontender, nondistended, +BS  EXTREMITIES: 2+ peripheral pulses on left, 1+ RLE pulses. No clubbing, cyanosis, or edema. Dressing in place over left foot, leakage of red-tinged fluid on sheet under foot. Dressing not taken down, per podiatry, "left foot fibronecrotic wound over the lateral foot probing to bone and tracking medially and plantarly, 1 cc of pus expressed, strong malodor, with erythematous and edematous dorsal forefoot. No clinical signs of infection of the right foot."  NERVOUS SYSTEM:  A&Ox3, L>R decreased sensation to feet, unclear timeline per patient.   SKIN: No rashes or lesions. Vertical scar over sternum appears well-healed. VITALS:   T(C): 36.8 (08-21-22 @ 01:16), Max: 36.8 (08-21-22 @ 01:16)  HR: 98 (08-21-22 @ 01:16) (98 - 98)  BP: 147/82 (08-21-22 @ 01:16) (144/85 - 147/82)  RR: 17 (08-21-22 @ 01:16) (17 - 17)  SpO2: 100% (08-21-22 @ 01:16) (100% - 100%)    GENERAL: NAD, lying in bed comfortably  HEAD:  Atraumatic, normocephalic  EYES: EOMI, PERRLA, conjunctiva and sclera clear. Left pupillary shape abnormal (patient reports prior eye surgery but does not recall details)  ENT: Moist mucous membranes  NECK: Supple, no JVD  HEART: Regular rate and rhythm, no murmurs, rubs, or gallops  LUNGS: Unlabored respirations.  Clear to auscultation bilaterally, no crackles, wheezing, or rhonchi  ABDOMEN: Soft, nontender, nondistended, +BS  EXTREMITIES: 2+ peripheral pulses on left, 1+ RLE pulses. No clubbing, cyanosis, or edema. Dressing in place over left foot, leakage of red-tinged fluid on sheet under foot. Dressing not taken down, per podiatry, "left foot fibronecrotic wound over the lateral foot probing to bone and tracking medially and plantarly, 1 cc of pus expressed, strong malodor, with erythematous and edematous dorsal forefoot. No clinical signs of infection of the right foot." Left arm glucose sensor.   NERVOUS SYSTEM:  A&Ox3, L>R decreased sensation to feet, unclear timeline per patient.   SKIN: No rashes or lesions. Vertical scar over sternum appears well-healed.

## 2022-08-21 NOTE — H&P ADULT - PROBLEM SELECTOR PLAN 6
On atorvastatin 80 mg QD at home.   - continue home medication No reported hypothyroidism by patient. Per pharmacy, patient is on levothyroxine 0.1 mg QD.   - obtain collateral to confirm if possible, patient is poor historian  - continue home dose once confirmed

## 2022-08-21 NOTE — H&P ADULT - ASSESSMENT
56 y/o man with MHX of IDDM c/b diabetic neuropathy (L small toe amputation March 2022), CAD s/p CABG (July 2022) , HTN who presents to the ED for 4 days of pain and discharge from his L small toe amputation site c/f osteomyelitis. Podiatry following.

## 2022-08-21 NOTE — H&P ADULT - PROBLEM SELECTOR PLAN 7
Diet: Diabetic diet. Pending plan from Podiatry.   DVT ppx: lovenox  Dispo: Diet: Diabetic diet. Pending plan from Podiatry.   DVT ppx:   Dispo: On atorvastatin 80 mg QD at home.   - continue home medication

## 2022-08-21 NOTE — H&P ADULT - NSICDXPASTSURGICALHX_GEN_ALL_CORE_FT
PAST SURGICAL HISTORY:  S/P amputation of lesser toe, left March 2022    S/P CABG (coronary artery bypass graft) July 2022

## 2022-08-21 NOTE — ED ADULT NURSE NOTE - OBJECTIVE STATEMENT
56 y/o male with hx of DM2 and CABG (7/7/2022) presents to ED with c/o left foot pain and swelling x4 days. Pt had a left 5th toe amputation ~4 months ago s/p osteomyelitis. Pt states the pain would sometimes be relieved with Tylenol at home but he wanted to be seen in the ED to determine the cause of these symptoms. Pt is a&ox3, spontaneous respirations and equal chest rise. Pt slightly tachycardic at 101. Pt refused being placed on cardiac monitor despite his cardiac hx and tachycardia. Otherwise, VSS on RA. Pt also endorsed some left chest burning. He states the burning started after he was lifting heavy objects at home. Pt reports that he called his PCP and was told to take it easy with lifting. Surgical scar noted to mid-sternal region. Scar is C/D/I and SANTOS. No tenderness to left chest wall.  Pt denies CP, SOB, palpitations, fever, chills, cough, n/v/d, abdominal pain, numbness, tingling. Left foot edema noted. Left 5th toe amputation site with a band aid intact. + CMST to LLE. Family at bedside. Bed locked and in lowest position, call bell within reach and side rail up for safety. Will continue to monitor.

## 2022-08-21 NOTE — H&P ADULT - NSHPREVIEWOFSYSTEMS_GEN_ALL_CORE

## 2022-08-21 NOTE — H&P ADULT - PROBLEM SELECTOR PLAN 3
On Amlodipine 5 mg QD at home per patient. BP in 140s/80s. Per med rec from pharmacy, also on Carvedilol 12.5 mg BID and Furosemide 40 mg QD. Patient on nifedipine 60 mg QD and Losartan 25 mg QD at the time of CABG, unclear if still taking.   - continue home meds  - call outpatient PCP to obtain collateral as patient is poor historian. On Amlodipine 5 mg QD at home per patient. BP in 140s/80s. Per med rec from pharmacy, also on Carvedilol 12.5 mg BID, Furosemide 40 mg QD, nifedipine 60 mg QD and Losartan 25 mg QD  - continue amlodipine, carvedilol, furosemide, losartan

## 2022-08-21 NOTE — H&P ADULT - NSHPSOCIALHISTORY_GEN_ALL_CORE
Patient denies any history of smoking, alcohol or drug use. He lives at home with his wife and 3 children. He works as an airplane . He was born in [facility], [facility].

## 2022-08-21 NOTE — ED PROVIDER NOTE - PHYSICAL EXAMINATION
General: Patient alert in no apparent distress  Skin: See Musculoskeletal  HEENT: Head atraumatic. Oral mucosa moist.   Eyes: Conjunctiva normal  Cardiac: Regular rhythm and rate. No pretibial edema b/l. 2+ DP pulses b/l   Respiratory: Lungs clear b/l and symmetric. No respiratory distress. Able to speak in complete sentences.  Gastrointestinal: Abdomen soft, nondistended, nontender  Musculoskeletal: Moves all extremities spontaneously. +ulcer to lateral left foot at amputation area with purulent discharge with ttp, no surrounding redness  Neurological: alert and oriented to person, place, and time  Psychiatric: Calm and cooperative

## 2022-08-21 NOTE — H&P ADULT - NSHPLABSRESULTS_GEN_ALL_CORE
.  LABS:                         11.2   10.28 )-----------( 329      ( 21 Aug 2022 05:28 )             34.2     08-21    140  |  102  |  12  ----------------------------<  149<H>  3.4<L>   |  25  |  0.69    Ca    9.5      21 Aug 2022 05:28  Phos  3.7     08-21  Mg     1.50     08-21    ECG:   Medical student interpretation: lead I, II, V5 V6 t-wave inversion, biphasic T-wave V4    < from: 12 Lead ECG (08.21.22 @ 02:14) >    Diagnosis Line Normal sinus rhythm  Nonspecific T wave abnormality  Abnormal ECG    < end of copied text >    RADIOLOGY, EKG & ADDITIONAL TESTS: Reviewed.    < from: Xray Foot AP + Lateral + Oblique, Left (08.21.22 @ 06:17) >    IMPRESSION:    Erosive changes about the lateral aspect of the left fourth metatarsal   head and proximal phalanx are suspicious for osteomyelitis. MRI can be   obtained for further characterization.    < end of copied text >

## 2022-08-21 NOTE — CONSULT NOTE ADULT - ASSESSMENT
- Pt was seen and evaluated.   - Afebrile, WBC 10.28, CRP 86, ESR 90  - ROSALEE: left foot fibronecrotic wound over the lateral foot probing to bone and tracking medially and plantarly, 1 cc of pus expressed, strong malodor, with erythematous and edematous dorsal forefoot. No clinical signs of infection of the right foot.   - Verbal consent obtained, Bedside incision and drainage was performed with sterile 15 blade and suture removal kit to the left lateral foot, depth to subQ and not beyond.  1 ccdrainage expressed, and strong malodor noted. Irrigated wound with copious amount of sterile saline. Applied packing, followed by dry sterile dressing. Patient tolerated the procedure well.  - X-Ray: Erosive changes about the lateral aspect of the left fourth metatarsal head and proximal phalanx are suspicious for osteomyelitis.   - Recommend admission through medicine.  - Recommend IV antibiotics Vanco and Cefepime  - Lef foot wound cultured   - Ordered MRI of the Left foot   - Pod Plan: Local wound care versus possible partial 4th resection of left foot pending MRI results   - Please document medical clearance for surgical intervention under local anesthesia and light IV sedation  - Discussed with attending.

## 2022-08-21 NOTE — CONSULT NOTE ADULT - SUBJECTIVE AND OBJECTIVE BOX
Patient is a 57y old  Male who presents with a chief complaint of      INTERVAL HPI/OVERNIGHT EVENTS:  Patient seen and evaluated at bedside.  Pt is resting comfortable in NAD. Denies N/V/F/C.    Allergies    No Known Allergies    Intolerances        Vital Signs Last 24 Hrs  T(C): 36.8 (21 Aug 2022 01:16), Max: 36.8 (21 Aug 2022 01:16)  T(F): 98.2 (21 Aug 2022 01:16), Max: 98.2 (21 Aug 2022 01:16)  HR: 98 (21 Aug 2022 01:16) (98 - 98)  BP: 147/82 (21 Aug 2022 01:16) (144/85 - 147/82)  BP(mean): --  RR: 17 (21 Aug 2022 01:16) (17 - 17)  SpO2: 100% (21 Aug 2022 01:16) (100% - 100%)    Parameters below as of 21 Aug 2022 01:16  Patient On (Oxygen Delivery Method): room air        LABS:                        11.2   10.28 )-----------( 329      ( 21 Aug 2022 05:28 )             34.2     08-21    140  |  102  |  12  ----------------------------<  149<H>  3.4<L>   |  25  |  0.69    Ca    9.5      21 Aug 2022 05:28  Phos  3.7     08-21  Mg     1.50     08-21          CAPILLARY BLOOD GLUCOSE      POCT Blood Glucose.: 242 mg/dL (21 Aug 2022 00:11)      Lower Extremity Physical Exam:  Vascular: DP/PT 2/4 B/L, CFT <3sec x 10, Temp gradient warm to warm of left foot, warm to cool of Right foot.    Neurology: Epicritic sensation decreased to level of forefoot, B/L  Musculoskeletal/Ortho: Pain on palpation over the lateral forefoot of the left foot.   Skin: left foot fibronecrotic wound over the lateral foot probing to bone and tracking medially and plantarly, 1 cc of pus expressed, strong malodor, with erythematous and edematous dorsal forefoot. No clinical signs of infection of the right foot.       RADIOLOGY & ADDITIONAL TESTS:    ACC: [id number] EXAM:  XR FOOT COMP MIN 3 VIEWS LT                          PROCEDURE DATE:  08/21/2022          INTERPRETATION:  CLINICAL INDICATION: Left fifth toe pain and discharge    TECHNIQUE.: 3 views of the left foot    COMPARISON: None available.    FINDINGS:    Status post amputation of the fifth ray to level of the mid metatarsal.   No acute fracture  There are erosive changes about the lateral aspect of the fourth   metatarsal head and proximal phalanx base.  Narrowing at the first metatarsophalangeal joint. Calcaneal   enthesophytes. Achilles insertional enthesophyte.  No evidence of subcutaneous gas.    IMPRESSION:    Erosive changes about the lateral aspect of the left fourth metatarsal   head and proximal phalanx are suspicious for osteomyelitis. MRI can be   obtained for further characterization.    --- End of Report ---          ATTILA TRAN MD; Resident Radiologist  This document has been electronically signed.  TRINH NOVOA MD; Attending Radiologist  This document has been electronically signed. Aug 21 2022  8:48AM

## 2022-08-21 NOTE — H&P ADULT - NSICDXPASTMEDICALHX_GEN_ALL_CORE_FT
PAST MEDICAL HISTORY:  CAD (coronary artery disease) s/p CABG July 2022    Diabetes IDDM, diabetic neuropathy    Hypertension

## 2022-08-21 NOTE — ED PROVIDER NOTE - CLINICAL SUMMARY MEDICAL DECISION MAKING FREE TEXT BOX
56yo M with PMHX DM, HTN, CABG x 4, L 5th toe amputation in March 2022 due to ?gangrene, p/w new 4 days of pain to amputation site and thick white discharge.  No trauma, falls, calf pain, sob, chest pains, fevers, or recent antibx use. Says visiting wound car RNs have been assisting him in dressing area since surgery.     EXAM as above    concern for osteomyelitis, infected wound  clinically not nec fasc

## 2022-08-21 NOTE — H&P ADULT - ATTENDING COMMENTS
Patient is a 58yo M with PMH of IDDM with neuropathy, s/p L 5th toe amputation 3/2022 for ?gangrene, HTN, and CAD s/p CABG 7/2022 who presented with 4 days of pain and discharge from the prior L 5th toe amputation site.       #Left 4th metatarsal OM - XR L foot: erosive changes about the lateral aspect of the L 4th metatarsal head and proximal phalanx suspicious for OM; podiatry following; ESR 90, CRP 86, WBC 10.28, not septic; f/u wound Cx; empiric abx with vancomycin, cefepime, and metronidazole; f/u MRI L foot.   #IDDM – home regimen: Tresiba 18U qhs, Humalog 6-8U qac TID, Ozempic, metformin 500mg po BID. Start weight-based insulin regimen inpatient, goal f/s 140-180.   #CAD s/p CABG – resume home antiplatelet agents and meds for GDMT.   #Hypokalemia – mildly decreased to 3.4, replenish as warranted. Also correct Magnesium.  #Hypomagnesemia – decreased to 1.5. Replenish as warranted.   #HTN – goal normotension; resume home antihypertensives. Priority is BB and ACE/ARB. Can restart diuretic and CCB for optimal control.   #Normocytic anemia – Hb 11.2 on presentation (decreased from prior labs early 2021) – iron panel, ferritin, TIBC, transferrin

## 2022-08-21 NOTE — ED PROVIDER NOTE - NS ED ROS FT
Constitutional: No weakness or fatigue, no fevers or chills  Skin: No rash or pruritis  HEENT: No sore throat  Cardiovascular: No chest pain, no shortness of breath  Respiratory: No shortness of breath, no cough  Gastrointestinal: No abdominal pain, no nausea, no vomiting, no diarrhea, no constipation  Musculoskeletal: +pain to left 5th toe  Genitourinary: No dysuria or hematuria  Neurological: No focal weakness or tingling

## 2022-08-21 NOTE — H&P ADULT - HISTORY OF PRESENT ILLNESS
58 y/o man with MHX of IDDM c/b diabetic neuropathy (L small toe amputation March 2022), CAD s/p CABG (July 2022) , HTN who presents to the ED for 4 days of pain and discharge from his L small toe amputation site. Four days ago, the patient noticed swelling, pain, and discharge from the site of him amputation. He denies any fever, chills, CP, SOB, N/V, changes in bowel movements or urination. He also denies any numbness or tingling in the legs. In March the patient states that he stepped on something which required him to get his left small toe amputated. He had a stress test in work-up for the surgery which was suggestive of CAD. He had CABG in July 2022.  58 y/o man with MHX of IDDM c/b diabetic neuropathy (L 5th toe amputation March 2022), CAD s/p CABG (July 2022) , HTN who presents to the ED for 4 days of pain and discharge from his L 5th toe amputation site. Four days ago, the patient noticed swelling, pain, and discharge from the site of him amputation. He denies any fever, chills, CP, SOB, N/V, changes in bowel movements or urination. He also denies any numbness or tingling in the legs. In March the patient states that he stepped on something which required him to get his left small toe amputated. He had a stress test in work-up for the surgery which was suggestive of CAD. He had CABG in July 2022.

## 2022-08-21 NOTE — ED ADULT TRIAGE NOTE - CHIEF COMPLAINT QUOTE
Pt. c/o left foot pain and swelling x4 days. Denies any injury ot trauma to foot. PHx open heart surgery 7/11, toe amputation to left foot, DM II.

## 2022-08-22 ENCOUNTER — TRANSCRIPTION ENCOUNTER (OUTPATIENT)
Age: 57
End: 2022-08-22

## 2022-08-22 DIAGNOSIS — D64.9 ANEMIA, UNSPECIFIED: ICD-10-CM

## 2022-08-22 LAB
A1C WITH ESTIMATED AVERAGE GLUCOSE RESULT: 7.7 % — HIGH (ref 4–5.6)
ALBUMIN SERPL ELPH-MCNC: 3 G/DL — LOW (ref 3.3–5)
ALP SERPL-CCNC: 73 U/L — SIGNIFICANT CHANGE UP (ref 40–120)
ALT FLD-CCNC: 10 U/L — SIGNIFICANT CHANGE UP (ref 4–41)
ANION GAP SERPL CALC-SCNC: 16 MMOL/L — HIGH (ref 7–14)
AST SERPL-CCNC: 19 U/L — SIGNIFICANT CHANGE UP (ref 4–40)
BILIRUB SERPL-MCNC: 0.3 MG/DL — SIGNIFICANT CHANGE UP (ref 0.2–1.2)
BUN SERPL-MCNC: 13 MG/DL — SIGNIFICANT CHANGE UP (ref 7–23)
CALCIUM SERPL-MCNC: 9.1 MG/DL — SIGNIFICANT CHANGE UP (ref 8.4–10.5)
CHLORIDE SERPL-SCNC: 105 MMOL/L — SIGNIFICANT CHANGE UP (ref 98–107)
CO2 SERPL-SCNC: 21 MMOL/L — LOW (ref 22–31)
CREAT SERPL-MCNC: 0.64 MG/DL — SIGNIFICANT CHANGE UP (ref 0.5–1.3)
EGFR: 110 ML/MIN/1.73M2 — SIGNIFICANT CHANGE UP
ESTIMATED AVERAGE GLUCOSE: 174 — SIGNIFICANT CHANGE UP
FERRITIN SERPL-MCNC: 898 NG/ML — HIGH (ref 30–400)
GLUCOSE BLDC GLUCOMTR-MCNC: 104 MG/DL — HIGH (ref 70–99)
GLUCOSE BLDC GLUCOMTR-MCNC: 106 MG/DL — HIGH (ref 70–99)
GLUCOSE BLDC GLUCOMTR-MCNC: 165 MG/DL — HIGH (ref 70–99)
GLUCOSE SERPL-MCNC: 144 MG/DL — HIGH (ref 70–99)
HCT VFR BLD CALC: 31.4 % — LOW (ref 39–50)
HCV AB S/CO SERPL IA: 0.11 S/CO — SIGNIFICANT CHANGE UP (ref 0–0.99)
HCV AB SERPL-IMP: SIGNIFICANT CHANGE UP
HGB BLD-MCNC: 10.2 G/DL — LOW (ref 13–17)
IRON SATN MFR SERPL: 19 % — SIGNIFICANT CHANGE UP (ref 14–50)
IRON SATN MFR SERPL: 40 UG/DL — LOW (ref 45–165)
MAGNESIUM SERPL-MCNC: 1.6 MG/DL — SIGNIFICANT CHANGE UP (ref 1.6–2.6)
MCHC RBC-ENTMCNC: 30.1 PG — SIGNIFICANT CHANGE UP (ref 27–34)
MCHC RBC-ENTMCNC: 32.5 GM/DL — SIGNIFICANT CHANGE UP (ref 32–36)
MCV RBC AUTO: 92.6 FL — SIGNIFICANT CHANGE UP (ref 80–100)
NRBC # BLD: 0 /100 WBCS — SIGNIFICANT CHANGE UP (ref 0–0)
NRBC # FLD: 0 K/UL — SIGNIFICANT CHANGE UP (ref 0–0)
PHOSPHATE SERPL-MCNC: 3.6 MG/DL — SIGNIFICANT CHANGE UP (ref 2.5–4.5)
PLATELET # BLD AUTO: 363 K/UL — SIGNIFICANT CHANGE UP (ref 150–400)
POTASSIUM SERPL-MCNC: 4.1 MMOL/L — SIGNIFICANT CHANGE UP (ref 3.5–5.3)
POTASSIUM SERPL-SCNC: 4.1 MMOL/L — SIGNIFICANT CHANGE UP (ref 3.5–5.3)
PROT SERPL-MCNC: 7.1 G/DL — SIGNIFICANT CHANGE UP (ref 6–8.3)
RBC # BLD: 3.39 M/UL — LOW (ref 4.2–5.8)
RBC # FLD: 13.2 % — SIGNIFICANT CHANGE UP (ref 10.3–14.5)
SODIUM SERPL-SCNC: 142 MMOL/L — SIGNIFICANT CHANGE UP (ref 135–145)
TIBC SERPL-MCNC: 210 UG/DL — LOW (ref 220–430)
TRANSFERRIN SERPL-MCNC: 159 MG/DL — LOW (ref 200–360)
UIBC SERPL-MCNC: 170 UG/DL — SIGNIFICANT CHANGE UP (ref 110–370)
WBC # BLD: 7.59 K/UL — SIGNIFICANT CHANGE UP (ref 3.8–10.5)
WBC # FLD AUTO: 7.59 K/UL — SIGNIFICANT CHANGE UP (ref 3.8–10.5)

## 2022-08-22 PROCEDURE — 99233 SBSQ HOSP IP/OBS HIGH 50: CPT | Mod: GC

## 2022-08-22 PROCEDURE — 73720 MRI LWR EXTREMITY W/O&W/DYE: CPT | Mod: 26,LT

## 2022-08-22 RX ORDER — TRAMADOL HYDROCHLORIDE 50 MG/1
50 TABLET ORAL EVERY 6 HOURS
Refills: 0 | Status: DISCONTINUED | OUTPATIENT
Start: 2022-08-22 | End: 2022-08-26

## 2022-08-22 RX ORDER — INSULIN GLARGINE 100 [IU]/ML
22 INJECTION, SOLUTION SUBCUTANEOUS AT BEDTIME
Refills: 0 | Status: DISCONTINUED | OUTPATIENT
Start: 2022-08-22 | End: 2022-08-23

## 2022-08-22 RX ORDER — IBUPROFEN 200 MG
600 TABLET ORAL ONCE
Refills: 0 | Status: COMPLETED | OUTPATIENT
Start: 2022-08-22 | End: 2022-08-22

## 2022-08-22 RX ADMIN — CARVEDILOL PHOSPHATE 12.5 MILLIGRAM(S): 80 CAPSULE, EXTENDED RELEASE ORAL at 17:52

## 2022-08-22 RX ADMIN — LOSARTAN POTASSIUM 25 MILLIGRAM(S): 100 TABLET, FILM COATED ORAL at 05:24

## 2022-08-22 RX ADMIN — CEFEPIME 100 MILLIGRAM(S): 1 INJECTION, POWDER, FOR SOLUTION INTRAMUSCULAR; INTRAVENOUS at 22:02

## 2022-08-22 RX ADMIN — Medication 1 MILLIGRAM(S): at 12:41

## 2022-08-22 RX ADMIN — Medication 650 MILLIGRAM(S): at 17:44

## 2022-08-22 RX ADMIN — Medication 40 MILLIGRAM(S): at 05:24

## 2022-08-22 RX ADMIN — INSULIN GLARGINE 22 UNIT(S): 100 INJECTION, SOLUTION SUBCUTANEOUS at 22:06

## 2022-08-22 RX ADMIN — GABAPENTIN 100 MILLIGRAM(S): 400 CAPSULE ORAL at 22:04

## 2022-08-22 RX ADMIN — Medication 7 UNIT(S): at 17:55

## 2022-08-22 RX ADMIN — HEPARIN SODIUM 5000 UNIT(S): 5000 INJECTION INTRAVENOUS; SUBCUTANEOUS at 14:18

## 2022-08-22 RX ADMIN — Medication 7 UNIT(S): at 08:53

## 2022-08-22 RX ADMIN — Medication 1: at 12:38

## 2022-08-22 RX ADMIN — GABAPENTIN 100 MILLIGRAM(S): 400 CAPSULE ORAL at 14:16

## 2022-08-22 RX ADMIN — Medication 166.67 MILLIGRAM(S): at 10:20

## 2022-08-22 RX ADMIN — CEFEPIME 100 MILLIGRAM(S): 1 INJECTION, POWDER, FOR SOLUTION INTRAMUSCULAR; INTRAVENOUS at 09:06

## 2022-08-22 RX ADMIN — CARVEDILOL PHOSPHATE 12.5 MILLIGRAM(S): 80 CAPSULE, EXTENDED RELEASE ORAL at 05:24

## 2022-08-22 RX ADMIN — Medication 325 MILLIGRAM(S): at 12:41

## 2022-08-22 RX ADMIN — ATORVASTATIN CALCIUM 80 MILLIGRAM(S): 80 TABLET, FILM COATED ORAL at 22:04

## 2022-08-22 RX ADMIN — Medication 600 MILLIGRAM(S): at 21:38

## 2022-08-22 RX ADMIN — Medication 7 UNIT(S): at 12:38

## 2022-08-22 RX ADMIN — AMLODIPINE BESYLATE 5 MILLIGRAM(S): 2.5 TABLET ORAL at 05:24

## 2022-08-22 RX ADMIN — Medication 600 MILLIGRAM(S): at 19:53

## 2022-08-22 RX ADMIN — Medication 100 MILLIGRAM(S): at 05:31

## 2022-08-22 RX ADMIN — Medication 100 MICROGRAM(S): at 05:24

## 2022-08-22 RX ADMIN — Medication 166.67 MILLIGRAM(S): at 01:07

## 2022-08-22 RX ADMIN — HEPARIN SODIUM 5000 UNIT(S): 5000 INJECTION INTRAVENOUS; SUBCUTANEOUS at 05:24

## 2022-08-22 RX ADMIN — SENNA PLUS 2 TABLET(S): 8.6 TABLET ORAL at 22:04

## 2022-08-22 RX ADMIN — Medication 166.67 MILLIGRAM(S): at 22:32

## 2022-08-22 RX ADMIN — HEPARIN SODIUM 5000 UNIT(S): 5000 INJECTION INTRAVENOUS; SUBCUTANEOUS at 22:04

## 2022-08-22 RX ADMIN — GABAPENTIN 100 MILLIGRAM(S): 400 CAPSULE ORAL at 05:24

## 2022-08-22 RX ADMIN — Medication 81 MILLIGRAM(S): at 12:41

## 2022-08-22 NOTE — DISCHARGE NOTE PROVIDER - PROVIDER TOKENS
PROVIDER:[TOKEN:[[id number]]] PROVIDER:[TOKEN:[[id number]]],PROVIDER:[TOKEN:[[id number]]] PROVIDER:[TOKEN:[[id number]],FOLLOWUP:[1 week]],PROVIDER:[TOKEN:[[id number]]]

## 2022-08-22 NOTE — PATIENT PROFILE ADULT - FALL HARM RISK - RISK INTERVENTIONS
Assistance OOB with selected safe patient handling equipment/Communicate Fall Risk and Risk Factors to all staff, patient, and family/Discuss with provider need for PT consult/Monitor gait and stability/Provide patient with walking aids - walker, cane, crutches/Reinforce activity limits and safety measures with patient and family/Review medications for side effects contributing to fall risk/Sit up slowly, dangle for a short time, stand at bedside before walking/Visual Cue: Yellow wristband/Bed in lowest position, wheels locked, appropriate side rails in place/Call bell, personal items and telephone in reach/Instruct patient to call for assistance before getting out of bed or chair/Non-slip footwear when patient is out of bed/[place] to call system/Physically safe environment - no spills, clutter or unnecessary equipment/Purposeful Proactive Rounding/Room/bathroom lighting operational, light cord in reach

## 2022-08-22 NOTE — PROGRESS NOTE ADULT - ATTENDING COMMENTS
56yo M with PMH of IDDM with neuropathy, s/p L 5th toe amputation 3/2022 for ?gangrene, HTN, and CAD s/p CABG 7/2022 who presented with 4 days of pain and discharge from the prior L 5th toe amputation site admitted for OM      #Left 4th metatarsal OM - XR L foot: erosive changes about the lateral aspect of the L 4th metatarsal head and proximal phalanx suspicious for OM; podiatry following; inflammatory makers elevated. Wound cx growing GNR and staph aureus. C/w vancomycin and cefepime f/u MRI L foot. Patient likely will need partial resection. Patient reports good exercise capacity since CABG, METS>4, no active chest pain and not overlaoded. Patient is medically optimized for planned procedure.   #IDDM – home regimen: Tresiba 18U qhs, Humalog 6-8U qac TID, Ozempic, metformin 500mg po BID. Start weight-based insulin regimen inpatient, goal f/s 140-180. FS low this am to 70. Decrease lantus to 22 and monitor FS.   #CAD s/p CABG – resume home antiplatelet agents and meds for GDMT.     Plan discussed with patient and HS

## 2022-08-22 NOTE — DISCHARGE NOTE PROVIDER - CARE PROVIDER_API CALL
Lili Henry (DPM)  Surgery  [address] [facility], [address] [place] [address]  Phone: [phone]  Fax: [phone]  Follow Up Time:    Lili Henry (DPMOOKIE)  Surgery  [address] [facility], [address]  [place] [address]  Phone: [phone]  Fax: [phone]  Follow Up Time:     Carlos Haywood)  Internal Medicine  [address] [place] [id number]  Phone: [phone]  Fax: [phone]  Follow Up Time:    Lili Henry (DPMOOKIE)  Surgery  [address] [facility], [address]  [place] [address]  Phone: [phone]  Fax: [phone]  Follow Up Time: 1 week    Carlos Haywood)  Internal Medicine  [address] [place] [id number]  Phone: [phone]  Fax: [phone]  Follow Up Time:

## 2022-08-22 NOTE — DISCHARGE NOTE PROVIDER - NSDCFUADDAPPT_GEN_ALL_CORE_FT
Please follow up with the above providers within 1-2 weeks of your discharge from the hospital  Please follow up with the above providers within 1-2 weeks of your discharge from the hospital      Podiatry Discharge Instructions:  Follow up: Please follow up with Dr. Key within 1 week of discharge from the hospital, please call [phone]  for appointment and discuss that you recently were seen in the hospital.  Wound Care: Please leave your dressing clean dry intact until your follow up appointment     Weight bearing: Please weight bear as tolerated in a surgical shoe to Left foot.   Antibiotics: Please continue as instructed.

## 2022-08-22 NOTE — PROGRESS NOTE ADULT - PROBLEM SELECTOR PLAN 1
Subacute pain and discharge from amputation site of L small toe. Probe to bone on exam on podiatric exam. XR c/f osteomyelitis of left fourth metatarsal head and proximal phalanx. CRP 86, ESR 80. WBC 10.28. cefepime 2g IVPB, clindamycin 900 mg IVPB, vancomycin 1g IVPB given in ED.  - continue vancomycin, cefepime, and flagyl for empiric treatment of osteomyelitis in a diabetic patient  - MRI to further assess for osteomyelitis per ID  - f/u Podiatry recs   - f/u wound culture Subacute pain and discharge from amputation site of L small toe. Probe to bone on exam on podiatric exam. XR c/f osteomyelitis of left fourth metatarsal head and proximal phalanx. CRP 86, ESR 80. WBC 10.28. cefepime 2g IVPB, clindamycin 900 mg IVPB, vancomycin 1g IVPB given in ED.  - continue vancomycin, cefepime, and flagyl for empiric treatment of osteomyelitis in a diabetic patient  - MRI to further assess for extent of osteomyelitis per podiatry  - f/u Podiatry recs   - Wound culture prelim: mod staph aureus and gram neg rods Subacute pain and discharge from amputation site of L small toe. Probe to bone on exam on podiatric exam. XR c/f osteomyelitis of left fourth metatarsal head and proximal phalanx. CRP 86, ESR 80. WBC 10.28. cefepime 2g IVPB, clindamycin 900 mg IVPB, vancomycin 1g IVPB given in ED.  - continue vancomycin, cefepime for empiric treatment of osteomyelitis in a diabetic patient  - d/c flagyl given prelim wound culture negative for anaerobes  - MRI schedule for today to further assess for extent of osteomyelitis per podiatry  - Podiatry: likely partial 4th resection of left foot pending MRI results  - Wound culture prelim: mod staph aureus and gram neg rods

## 2022-08-22 NOTE — DISCHARGE NOTE PROVIDER - NSDCCPCAREPLAN_GEN_ALL_CORE_FT
PRINCIPAL DISCHARGE DIAGNOSIS  Diagnosis: Left foot infection  Assessment and Plan of Treatment: You presented to the hospital due to pain, and pus visualized at the site of your previous amputation on your left foot, and were evaluated by podiatry (foot specialists) and imaging concerning for osteomyelitis (infection of the bone) of your foot. You were initiated on antibiotics administered through your veins.   Please return to the hospital if you experience fever, chills, severe headache, dizziness, lightheadedness, loss of consciousness, visual disturbance, chest pain, palpitations, shortness of breath,  abdominal pain, nausea, vomiting, diarrhea, blood in your vomit/stool/urine, burning with urination or change in urinary pattern, numbness, weakness, tingling, or other alarming symptoms.         PRINCIPAL DISCHARGE DIAGNOSIS  Diagnosis: Left foot infection  Assessment and Plan of Treatment: You presented to the hospital due to pain, and pus visualized at the site of your previous amputation on your left foot, and were evaluated by podiatry (foot specialists) and imaging concerning for osteomyelitis (infection of the bone) of your foot. You were initiated on antibiotics administered through your veins. You were scheduled for surgery to amputate the infected bone.   Please return to the hospital if you experience fever, chills, severe headache, dizziness, lightheadedness, loss of consciousness, visual disturbance, chest pain, palpitations, shortness of breath,  abdominal pain, nausea, vomiting, diarrhea, blood in your vomit/stool/urine, burning with urination or change in urinary pattern, numbness, weakness, tingling, or other alarming symptoms.        SECONDARY DISCHARGE DIAGNOSES  Diagnosis: Diabetes mellitus treated with insulin  Assessment and Plan of Treatment: While in the hospital you were administered long-acting insulin and short-acting insulin to control your blood sugar. Please continue insulin at home for appropriate control. Please follow-up with your Endocrinologist and Primary Care Physician. With diabetes it is also crucial that you follow-up with an Ophthalmologist and Nephrologist to ensure that you are not having complications of your eyes and kidneys as a result of the diabetes.    Diagnosis: HTN (hypertension)  Assessment and Plan of Treatment: Your blood pressure was well-controlled in the hospital on amlodipine (Norvasc), carvedilol, furosemide, and losartan. Please follow-up with your primary care physician to manage your blood pressure regimen outside the hospital.    Diagnosis: S/P CABG (coronary artery bypass graft)  Assessment and Plan of Treatment: You had open heart surgery in July. You have not had any chest pain or swelling and are doing well since the surgery. Continue to take aspirin 81 mg and follow-up with your primary care physician regarding your return to work plan after the surgery.    Diagnosis: Hypothyroidism  Assessment and Plan of Treatment: You were being treated with levothyroxine outside the hospital for hypothyroidism. Please continue this medication as prescribed and follow-up with your primary care physician and endocrinologist. If you notice intolerance to hot or cold, excess sweating, palpitations, weight changes, or constipation/diarrhea please notify your doctor.    Diagnosis: HLD (hyperlipidemia)  Assessment and Plan of Treatment: You were being treated outside the hospital with atorvastatin given your history of heart disease requiring CABG heart surgery. Please continue this medication and follow-up with your cardiologist and primary care physicians.    Diagnosis: Anemia  Assessment and Plan of Treatment: You were found to have anemia with diminished hemoglobin level in the hospital. Based on your iron levels in your blood, this is most likely due to your chronic disease (diabetes, heart disease, high cholesterol). You did not have any signs of bleeding. If you noticed red or dark stool, blood in your urine, fatigue, dizziness on standing please notify your primary care physician. Follow-up with your Primary care physician.     PRINCIPAL DISCHARGE DIAGNOSIS  Diagnosis: Left foot infection  Assessment and Plan of Treatment: You presented to the hospital due to pain, and pus visualized at the site of your previous amputation on your left foot, and were evaluated by podiatry (foot specialists) and imaging concerning for osteomyelitis (infection of the bone) of your foot. You were initiated on antibiotics administered through your veins. You were scheduled for surgery to amputate the infected bone and were continued on antibiotics. The cultures taken during the surgery showed ______.   Please return to the hospital if you experience fever, chills, severe headache, dizziness, lightheadedness, loss of consciousness, visual disturbance, chest pain, palpitations, shortness of breath,  abdominal pain, nausea, vomiting, diarrhea, blood in your vomit/stool/urine, burning with urination or change in urinary pattern, numbness, weakness, tingling, or other alarming symptoms.        SECONDARY DISCHARGE DIAGNOSES  Diagnosis: Diabetes mellitus treated with insulin  Assessment and Plan of Treatment: While in the hospital you were administered long-acting insulin and short-acting insulin to control your blood sugar. Please continue insulin at home for appropriate control. Please follow-up with your Endocrinologist and Primary Care Physician. With diabetes it is also crucial that you follow-up with an Ophthalmologist and Nephrologist to ensure that you are not having complications of your eyes and kidneys as a result of the diabetes.    Diagnosis: HTN (hypertension)  Assessment and Plan of Treatment: Your blood pressure was well-controlled in the hospital on amlodipine (Norvasc), carvedilol, furosemide, and losartan. Please follow-up with your primary care physician to manage your blood pressure regimen outside the hospital.    Diagnosis: S/P CABG (coronary artery bypass graft)  Assessment and Plan of Treatment: You had open heart surgery in July. You have not had any chest pain or swelling and are doing well since the surgery. Continue to take aspirin 81 mg and follow-up with your primary care physician regarding your return to work plan after the surgery.    Diagnosis: Hypothyroidism  Assessment and Plan of Treatment: You were being treated with levothyroxine outside the hospital for hypothyroidism. Please continue this medication as prescribed and follow-up with your primary care physician and endocrinologist. If you notice intolerance to hot or cold, excess sweating, palpitations, weight changes, or constipation/diarrhea please notify your doctor.    Diagnosis: HLD (hyperlipidemia)  Assessment and Plan of Treatment: You were being treated outside the hospital with atorvastatin given your history of heart disease requiring CABG heart surgery. Please continue this medication and follow-up with your cardiologist and primary care physicians.    Diagnosis: Anemia  Assessment and Plan of Treatment: You were found to have anemia with diminished hemoglobin level in the hospital. Based on your iron levels in your blood, this is most likely due to your chronic disease (diabetes, heart disease, high cholesterol). You did not have any signs of bleeding. If you noticed red or dark stool, blood in your urine, fatigue, dizziness on standing please notify your primary care physician. Follow-up with your Primary care physician.     PRINCIPAL DISCHARGE DIAGNOSIS  Diagnosis: Left foot infection  Assessment and Plan of Treatment: You presented to the hospital due to pain, and pus visualized at the site of your previous amputation on your left foot, and were evaluated by podiatry (foot specialists) and imaging concerning for osteomyelitis (infection of the bone) of your foot. You were initiated on antibiotics administered through your veins. You were scheduled for surgery to amputate the infected bone and were continued on antibiotics. The cultures taken during the surgery were without growth, therefore you were discharged off of antibiotics at the podiatry and infectious disease teams recommendations.   Please follow up with your primary care physician, podiatrist, ophthalmolgist, and infectious disease team moving forward for further evaluation and management of your condition.  Please return to the hospital if you experience fever, chills, severe headache, dizziness, lightheadedness, loss of consciousness, visual disturbance, chest pain, palpitations, shortness of breath,  abdominal pain, nausea, vomiting, diarrhea, blood in your vomit/stool/urine, burning with urination or change in urinary pattern, numbness, weakness, tingling, or other alarming symptoms.        SECONDARY DISCHARGE DIAGNOSES  Diagnosis: Diabetes mellitus treated with insulin  Assessment and Plan of Treatment: While in the hospital you were administered long-acting insulin and short-acting insulin to control your blood sugar. Please continue insulin at home for appropriate control. Please follow-up with your Endocrinologist and Primary Care Physician. With diabetes it is also crucial that you follow-up with an Ophthalmologist and podiatrist to ensure that you are not having complications of your eyes and kidneys as a result of the diabetes.    Diagnosis: HTN (hypertension)  Assessment and Plan of Treatment: Your blood pressure was well-controlled in the hospital on amlodipine (Norvasc), carvedilol, furosemide, and losartan. Please follow-up with your primary care physician to manage your blood pressure regimen outside the hospital.    Diagnosis: S/P CABG (coronary artery bypass graft)  Assessment and Plan of Treatment: You had open heart surgery in July.  Continue to take aspirin 81 mg and follow-up with your primary care physician regarding your return to work plan after the surgery.    Diagnosis: Hypothyroidism  Assessment and Plan of Treatment: You were being treated with levothyroxine outside the hospital for hypothyroidism. Please continue this medication as prescribed and follow-up with your primary care physician and endocrinologist. If you notice intolerance to hot or cold, excess sweating, palpitations, weight changes, or constipation/diarrhea please notify your doctor.    Diagnosis: HLD (hyperlipidemia)  Assessment and Plan of Treatment: You were being treated outside the hospital with atorvastatin given your history of heart disease requiring CABG heart surgery. Please continue this medication and follow-up with your cardiologist and primary care physicians.    Diagnosis: Anemia  Assessment and Plan of Treatment: You were found to have anemia with diminished hemoglobin level in the hospital. Based on your iron levels in your blood, this is most likely due to your chronic disease (diabetes, heart disease, high cholesterol). You did not have any signs of bleeding. If you noticed red or dark stool, blood in your urine, fatigue, dizziness on standing please notify your primary care physician. Follow-up with your Primary care physician.

## 2022-08-22 NOTE — PROGRESS NOTE ADULT - ASSESSMENT
58 y/o M w/ left foot wound  - Pt was seen and evaluated.   - Afebrile, WBC 10.28, CRP 86, ESR 90  - Left foot fibronecrotic wound local to the lateral fourth ray, probes to bone and tracks medially and plantarly, No pus today, malodor mildly improved   - X-Ray: Erosive changes about the lateral aspect of the left fourth metatarsal head and proximal phalanx are suspicious for osteomyelitis.   - Recommend ID consult  - Lef foot wound culture growing staph prelim  - L foot MR pending  - Pod Plan: likely partial 4th resection of left foot pending MRI results   - Please document medical clearance for surgical intervention under local anesthesia and light IV sedation  - Seen with attending 56 y/o M w/ left foot wound  - Pt was seen and evaluated.   - Afebrile, WBC 10.28, CRP 86, ESR 90  - Left foot fibronecrotic wound local to the lateral fourth ray, probes to bone and tracks medially and plantarly, No pus today, malodor mildly improved   - X-Ray: Erosive changes about the lateral aspect of the left fourth metatarsal head and proximal phalanx are suspicious for osteomyelitis.   - Recommend ID consult  - Lef foot wound culture growing staph prelim  - L foot MR pending  - Pod Plan: likely partial 4th resection of left foot pending MRI results   - Please document medical clearance for left foot surgery under local anesthesia and light IV sedation  - Seen with attending

## 2022-08-22 NOTE — PROGRESS NOTE ADULT - PROBLEM SELECTOR PLAN 5
hypomagnesemia and hypokalemia - correct as warranted Hg 11.2 on arrival, decreased from early 2021.   - iron studies showed low total iron, low TIBC, % sat wnl, high ferritin, likely representing anemia of chronic disease.

## 2022-08-22 NOTE — PROGRESS NOTE ADULT - PROBLEM SELECTOR PLAN 2
On 27 units long-acting insulin once and 7 units short acting insulin with meals at home, per patient. Per pharmacy, pt rx Tresiba 18 units, Humalog 6-8 units with meals, and Ozempic. On Metformin 500 mg BID. 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 On gabapentin 100 mg TID.   - 27 units long-acting, 7 units pre-meal bolus along with ISS  - continue gabapentin 100 mg TID On 27 units long-acting insulin once and 7 units short acting insulin with meals at home, per patient. Per pharmacy, pt rx Tresiba 18 units, Humalog 6-8 units with meals, and Ozempic. On Metformin 500 mg BID. 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 On gabapentin 100 mg TID. HgA1c 7.7%.   - 27 units long-acting, 7 units pre-meal bolus along with ISS  - continue gabapentin 100 mg TID On 27 units long-acting insulin once and 7 units short acting insulin with meals at home, per patient. Per pharmacy, pt rx Tresiba 18 units, Humalog 6-8 units with meals, and Ozempic. On Metformin 500 mg BID. 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 On gabapentin 100 mg TID. HgA1c 7.7%.   - 22 units long-acting, 7 units pre-meal bolus along with ISS  - continue gabapentin 100 mg TID

## 2022-08-22 NOTE — PROGRESS NOTE ADULT - PROBLEM SELECTOR PLAN 8
Diet: Diabetic diet. Pending plan from Podiatry.   DVT ppx: lovenox  Dispo: admit to medicine On atorvastatin 80 mg QD at home.   - continue home medication

## 2022-08-22 NOTE — PROGRESS NOTE ADULT - SUBJECTIVE AND OBJECTIVE BOX
DATE OF SERVICE: 08-22-22 @ 06:50    Patient is a 57y old  Male who presents with a chief complaint of pain/discharge from left small toe amputation site (21 Aug 2022 11:39)      SUBJECTIVE / OVERNIGHT EVENTS:    MEDICATIONS  (STANDING):  amLODIPine   Tablet 5 milliGRAM(s) Oral daily  aspirin  chewable 81 milliGRAM(s) Oral daily  atorvastatin 80 milliGRAM(s) Oral at bedtime  carvedilol 12.5 milliGRAM(s) Oral every 12 hours  cefepime   IVPB 2000 milliGRAM(s) IV Intermittent every 12 hours  dextrose 5%. 1000 milliLiter(s) (100 mL/Hr) IV Continuous <Continuous>  dextrose 5%. 1000 milliLiter(s) (50 mL/Hr) IV Continuous <Continuous>  dextrose 50% Injectable 25 Gram(s) IV Push once  dextrose 50% Injectable 12.5 Gram(s) IV Push once  dextrose 50% Injectable 25 Gram(s) IV Push once  ferrous    sulfate 325 milliGRAM(s) Oral daily  folic acid 1 milliGRAM(s) Oral daily  furosemide    Tablet 40 milliGRAM(s) Oral daily  gabapentin 100 milliGRAM(s) Oral three times a day  glucagon  Injectable 1 milliGRAM(s) IntraMuscular once  heparin   Injectable 5000 Unit(s) SubCutaneous every 8 hours  insulin glargine Injectable (LANTUS) 27 Unit(s) SubCutaneous at bedtime  insulin lispro (ADMELOG) corrective regimen sliding scale   SubCutaneous three times a day before meals  insulin lispro (ADMELOG) corrective regimen sliding scale   SubCutaneous at bedtime  insulin lispro Injectable (ADMELOG) 7 Unit(s) SubCutaneous three times a day before meals  levothyroxine 100 MICROGram(s) Oral daily  losartan 25 milliGRAM(s) Oral daily  metroNIDAZOLE  IVPB 500 milliGRAM(s) IV Intermittent every 8 hours  senna 2 Tablet(s) Oral at bedtime  vancomycin  IVPB 1250 milliGRAM(s) IV Intermittent every 12 hours    MEDICATIONS  (PRN):  acetaminophen     Tablet .. 650 milliGRAM(s) Oral every 6 hours PRN Temp greater or equal to 38C (100.4F), Mild Pain (1 - 3)  dextrose Oral Gel 15 Gram(s) Oral once PRN Blood Glucose LESS THAN 70 milliGRAM(s)/deciliter      Vital Signs Last 24 Hrs  T(C): 36.2 (22 Aug 2022 05:17), Max: 36.9 (22 Aug 2022 00:56)  T(F): 97.1 (22 Aug 2022 05:17), Max: 98.4 (22 Aug 2022 00:56)  HR: 78 (22 Aug 2022 05:17) (78 - 100)  BP: 138/79 (22 Aug 2022 05:17) (123/68 - 151/88)  BP(mean): --  RR: 17 (22 Aug 2022 05:17) (16 - 18)  SpO2: 100% (22 Aug 2022 05:17) (99% - 100%)    Parameters below as of 22 Aug 2022 05:17  Patient On (Oxygen Delivery Method): room air      CAPILLARY BLOOD GLUCOSE      POCT Blood Glucose.: 132 mg/dL (21 Aug 2022 22:51)  POCT Blood Glucose.: 163 mg/dL (21 Aug 2022 17:17)    I&O's Summary      PHYSICAL EXAM:  GENERAL: NAD, well-developed  HEAD:  Atraumatic, Normocephalic  EYES: EOMI, PERRLA, conjunctiva and sclera clear  NECK: Supple, No JVD  CHEST/LUNG: Clear to auscultation bilaterally; No wheeze  HEART: Regular rate and rhythm; No murmurs, rubs, or gallops  ABDOMEN: Soft, Nontender, Nondistended; Bowel sounds present  EXTREMITIES:  2+ Peripheral Pulses, No clubbing, cyanosis, or edema  PSYCH: AAOx3  NEUROLOGY: non-focal  SKIN: No rashes or lesions    LABS:                        11.2   10.28 )-----------( 329      ( 21 Aug 2022 05:28 )             34.2     08-21    140  |  102  |  12  ----------------------------<  149<H>  3.4<L>   |  25  |  0.69    Ca    9.5      21 Aug 2022 05:28  Phos  3.7     08-21  Mg     1.50     08-21                RADIOLOGY & ADDITIONAL TESTS:    Imaging Personally Reviewed:    Consultant(s) Notes Reviewed:      Care Discussed with Consultants/Other Providers:   DATE OF SERVICE: 08-22-22 @ 06:50    Patient is a 57y old  Male who presents with a chief complaint of pain/discharge from left small toe amputation site (21 Aug 2022 11:39)      SUBJECTIVE / OVERNIGHT EVENTS: No acute events overnight. Patient request motrin for foot pain overnight with relief, declined tylenol. Denies any CP, SOB, N/V, abdominal pain, difficult moving bowels or urinating. Patient states that podiatry saw him this morning.     MEDICATIONS  (STANDING):  amLODIPine   Tablet 5 milliGRAM(s) Oral daily  aspirin  chewable 81 milliGRAM(s) Oral daily  atorvastatin 80 milliGRAM(s) Oral at bedtime  carvedilol 12.5 milliGRAM(s) Oral every 12 hours  cefepime   IVPB 2000 milliGRAM(s) IV Intermittent every 12 hours  dextrose 5%. 1000 milliLiter(s) (100 mL/Hr) IV Continuous <Continuous>  dextrose 5%. 1000 milliLiter(s) (50 mL/Hr) IV Continuous <Continuous>  dextrose 50% Injectable 25 Gram(s) IV Push once  dextrose 50% Injectable 12.5 Gram(s) IV Push once  dextrose 50% Injectable 25 Gram(s) IV Push once  ferrous    sulfate 325 milliGRAM(s) Oral daily  folic acid 1 milliGRAM(s) Oral daily  furosemide    Tablet 40 milliGRAM(s) Oral daily  gabapentin 100 milliGRAM(s) Oral three times a day  glucagon  Injectable 1 milliGRAM(s) IntraMuscular once  heparin   Injectable 5000 Unit(s) SubCutaneous every 8 hours  insulin glargine Injectable (LANTUS) 27 Unit(s) SubCutaneous at bedtime  insulin lispro (ADMELOG) corrective regimen sliding scale   SubCutaneous three times a day before meals  insulin lispro (ADMELOG) corrective regimen sliding scale   SubCutaneous at bedtime  insulin lispro Injectable (ADMELOG) 7 Unit(s) SubCutaneous three times a day before meals  levothyroxine 100 MICROGram(s) Oral daily  losartan 25 milliGRAM(s) Oral daily  metroNIDAZOLE  IVPB 500 milliGRAM(s) IV Intermittent every 8 hours  senna 2 Tablet(s) Oral at bedtime  vancomycin  IVPB 1250 milliGRAM(s) IV Intermittent every 12 hours    MEDICATIONS  (PRN):  acetaminophen     Tablet .. 650 milliGRAM(s) Oral every 6 hours PRN Temp greater or equal to 38C (100.4F), Mild Pain (1 - 3)  dextrose Oral Gel 15 Gram(s) Oral once PRN Blood Glucose LESS THAN 70 milliGRAM(s)/deciliter      Vital Signs Last 24 Hrs  T(C): 36.2 (22 Aug 2022 05:17), Max: 36.9 (22 Aug 2022 00:56)  T(F): 97.1 (22 Aug 2022 05:17), Max: 98.4 (22 Aug 2022 00:56)  HR: 78 (22 Aug 2022 05:17) (78 - 100)  BP: 138/79 (22 Aug 2022 05:17) (123/68 - 151/88)  BP(mean): --  RR: 17 (22 Aug 2022 05:17) (16 - 18)  SpO2: 100% (22 Aug 2022 05:17) (99% - 100%)    Parameters below as of 22 Aug 2022 05:17  Patient On (Oxygen Delivery Method): room air      CAPILLARY BLOOD GLUCOSE      POCT Blood Glucose.: 132 mg/dL (21 Aug 2022 22:51)  POCT Blood Glucose.: 163 mg/dL (21 Aug 2022 17:17)    I&O's Summary      PHYSICAL EXAM:  GENERAL: NAD, well-developed  HEAD:  Atraumatic, Normocephalic  EYES: EOMI, PERRLA, conjunctiva and sclera clear  NECK: Supple, No JVD  CHEST/LUNG: Clear to auscultation bilaterally; No wheeze  HEART: Regular rate and rhythm; No murmurs, rubs, or gallops  ABDOMEN: Soft, Nontender, Nondistended; Bowel sounds present  EXTREMITIES:  2+ Peripheral Pulses on R, 1+ L DP, No clubbing, cyanosis, or edema. Dressing present over left foot and prior 5th toe amputation site.   PSYCH: AAOx3  NEUROLOGY: non-focal. Decreased sensation L foot.   SKIN: No rashes or lesions    LABS:                        11.2   10.28 )-----------( 329      ( 21 Aug 2022 05:28 )             34.2     08-21    140  |  102  |  12  ----------------------------<  149<H>  3.4<L>   |  25  |  0.69    Ca    9.5      21 Aug 2022 05:28  Phos  3.7     08-21  Mg     1.50     08-21                RADIOLOGY & ADDITIONAL TESTS:    Imaging Personally Reviewed:    Consultant(s) Notes Reviewed:      Care Discussed with Consultants/Other Providers:   DATE OF SERVICE: 08-22-22 @ 06:50    Patient is a 57y old  Male who presents with a chief complaint of pain/discharge from left small toe amputation site (21 Aug 2022 11:39)      SUBJECTIVE / OVERNIGHT EVENTS: No acute events overnight. Patient request motrin for foot pain overnight with relief, declined tylenol. Denies any CP, SOB, N/V, abdominal pain, difficult moving bowels or urinating. Patient states that podiatry saw him this morning.     MEDICATIONS  (STANDING):  amLODIPine   Tablet 5 milliGRAM(s) Oral daily  aspirin  chewable 81 milliGRAM(s) Oral daily  atorvastatin 80 milliGRAM(s) Oral at bedtime  carvedilol 12.5 milliGRAM(s) Oral every 12 hours  cefepime   IVPB 2000 milliGRAM(s) IV Intermittent every 12 hours  dextrose 5%. 1000 milliLiter(s) (100 mL/Hr) IV Continuous <Continuous>  dextrose 5%. 1000 milliLiter(s) (50 mL/Hr) IV Continuous <Continuous>  dextrose 50% Injectable 25 Gram(s) IV Push once  dextrose 50% Injectable 12.5 Gram(s) IV Push once  dextrose 50% Injectable 25 Gram(s) IV Push once  ferrous    sulfate 325 milliGRAM(s) Oral daily  folic acid 1 milliGRAM(s) Oral daily  furosemide    Tablet 40 milliGRAM(s) Oral daily  gabapentin 100 milliGRAM(s) Oral three times a day  glucagon  Injectable 1 milliGRAM(s) IntraMuscular once  heparin   Injectable 5000 Unit(s) SubCutaneous every 8 hours  insulin glargine Injectable (LANTUS) 27 Unit(s) SubCutaneous at bedtime  insulin lispro (ADMELOG) corrective regimen sliding scale   SubCutaneous three times a day before meals  insulin lispro (ADMELOG) corrective regimen sliding scale   SubCutaneous at bedtime  insulin lispro Injectable (ADMELOG) 7 Unit(s) SubCutaneous three times a day before meals  levothyroxine 100 MICROGram(s) Oral daily  losartan 25 milliGRAM(s) Oral daily  metroNIDAZOLE  IVPB 500 milliGRAM(s) IV Intermittent every 8 hours  senna 2 Tablet(s) Oral at bedtime  vancomycin  IVPB 1250 milliGRAM(s) IV Intermittent every 12 hours    MEDICATIONS  (PRN):  acetaminophen     Tablet .. 650 milliGRAM(s) Oral every 6 hours PRN Temp greater or equal to 38C (100.4F), Mild Pain (1 - 3)  dextrose Oral Gel 15 Gram(s) Oral once PRN Blood Glucose LESS THAN 70 milliGRAM(s)/deciliter      Vital Signs Last 24 Hrs  T(C): 36.2 (22 Aug 2022 05:17), Max: 36.9 (22 Aug 2022 00:56)  T(F): 97.1 (22 Aug 2022 05:17), Max: 98.4 (22 Aug 2022 00:56)  HR: 78 (22 Aug 2022 05:17) (78 - 100)  BP: 138/79 (22 Aug 2022 05:17) (123/68 - 151/88)  BP(mean): --  RR: 17 (22 Aug 2022 05:17) (16 - 18)  SpO2: 100% (22 Aug 2022 05:17) (99% - 100%)    Parameters below as of 22 Aug 2022 05:17  Patient On (Oxygen Delivery Method): room air      CAPILLARY BLOOD GLUCOSE      POCT Blood Glucose.: 132 mg/dL (21 Aug 2022 22:51)  POCT Blood Glucose.: 163 mg/dL (21 Aug 2022 17:17)    I&O's Summary      PHYSICAL EXAM:  GENERAL: NAD, well-developed  HEAD:  Atraumatic, Normocephalic  EYES: EOMI, PERRLA, conjunctiva and sclera clear  NECK: Supple, No JVD  CHEST/LUNG: Clear to auscultation bilaterally; No wheeze  HEART: Regular rate and rhythm; No murmurs, rubs, or gallops  ABDOMEN: Soft, Nontender, Nondistended; Bowel sounds present  EXTREMITIES:  2+ Peripheral Pulses on R, 1+ L DP, No clubbing, cyanosis, or edema. Open wound with granulation tissue and serosanguinous leakage. Painful to touch.    PSYCH: AAOx3  NEUROLOGY: non-focal. Decreased sensation L foot.   SKIN: No rashes or lesions    LABS:                        11.2   10.28 )-----------( 329      ( 21 Aug 2022 05:28 )             34.2     08-21    140  |  102  |  12  ----------------------------<  149<H>  3.4<L>   |  25  |  0.69    Ca    9.5      21 Aug 2022 05:28  Phos  3.7     08-21  Mg     1.50     08-21                RADIOLOGY & ADDITIONAL TESTS:    Imaging Personally Reviewed:    Consultant(s) Notes Reviewed:      Care Discussed with Consultants/Other Providers:

## 2022-08-22 NOTE — DISCHARGE NOTE PROVIDER - NSFOLLOWUPCLINICSTOKEN_GEN_ALL_ED_FT
[id number]: || ||00\01||False; [id number]: || ||00\01||False;[id number]: || ||00\01||False;[id number]: || ||00\01||False;[id number]: || ||00\01||False; 206301: || ||00\01||False;[id number]: || ||00\01||False; 009506:1 week|| ||00\01||False;[id number]: || ||00\01||False;

## 2022-08-22 NOTE — PROGRESS NOTE ADULT - PROBLEM SELECTOR PLAN 7
On atorvastatin 80 mg QD at home.   - continue home medication No reported hypothyroidism by patient. Per pharmacy, patient is on levothyroxine 0.1 mg QD.   - continue home dose No reported hypothyroidism by patient. Per pharmacy, patient is on levothyroxine 0.1 mg QD.   - continue home dose  - TSH to eval

## 2022-08-22 NOTE — DISCHARGE NOTE PROVIDER - NSDCCPTREATMENT_GEN_ALL_CORE_FT
PRINCIPAL PROCEDURE  Procedure: XR foot left, 3 views  Findings and Treatment:   < end of copied text >  ACC: [id number] EXAM:  XR FOOT COMP MIN 3 VIEWS LT                        PROCEDURE DATE:  08/21/2022    INTERPRETATION:  CLINICAL INDICATION: Left fifth toe pain and discharge  TECHNIQUE.: 3 views of the left foot  COMPARISON: None available.  FINDINGS:  Status post amputation of the fifth ray to level of the mid metatarsal.   No acute fracture  There are erosive changes about the lateral aspect of the fourth   metatarsal head and proximal phalanx base.  Narrowing at the first metatarsophalangeal joint. Calcaneal   enthesophytes. Achilles insertional enthesophyte.  No evidence of subcutaneous gas.  IMPRESSION:  Erosive changes about the lateral aspect of the left fourth metatarsal   head and proximal phalanx are suspicious for osteomyelitis. MRI can be   obtained for further characterization.  --- End of Report ---  ATTILA TRAN MD; Resident Radiologist  This document has been electronically signed.  TRINH NOVOA MD; Attending Radiologist  This document has been electronically signed. Aug 21 2022  8:48AM< from: Xray Foot AP + Lateral + Oblique, Left (08.21.22 @ 06:17) >         PRINCIPAL PROCEDURE  Procedure: XR foot left, 3 views  Findings and Treatment:   < end of copied text >  ACC: [id number] EXAM:  XR FOOT COMP MIN 3 VIEWS LT                        PROCEDURE DATE:  08/21/2022    INTERPRETATION:  CLINICAL INDICATION: Left fifth toe pain and discharge  TECHNIQUE.: 3 views of the left foot  COMPARISON: None available.  FINDINGS:  Status post amputation of the fifth ray to level of the mid metatarsal.   No acute fracture  There are erosive changes about the lateral aspect of the fourth   metatarsal head and proximal phalanx base.  Narrowing at the first metatarsophalangeal joint. Calcaneal   enthesophytes. Achilles insertional enthesophyte.  No evidence of subcutaneous gas.  IMPRESSION:  Erosive changes about the lateral aspect of the left fourth metatarsal   head and proximal phalanx are suspicious for osteomyelitis. MRI can be   obtained for further characterization.  --- End of Report ---  ATTILA TRAN MD; Resident Radiologist  This document has been electronically signed.  TRINH NOVOA MD; Attending Radiologist  This document has been electronically signed. Aug 21 2022  8:48AM< from: Xray Foot AP + Lateral + Oblique, Left (08.21.22 @ 06:17) >        SECONDARY PROCEDURE  Procedure: MR foot LT  Findings and Treatment: IMPRESSION:  1.  Septic arthritis/osteomyelitis centered at the fourth MTP, joint   owing to a lateral deep ulcer with soft tissue phlegmon extending to the   joint, with draining abscess to the skin at that level. Partial erosion   of the fourth metatarsal head is present as well as the base of the   fourth digit proximal phalanx. Osteomyelitis involves the entire distal   half of the fourth metatarsal and the fourth digit proximal phalanx.  2.  Reactive osteitis within the adjacent third metatarsal head.  3.  Status post transmetatarsal amputation of the fifth ray, without   stump osteomyelitis.< from: MR Foot w/wo IV Cont, Left (08.22.22 @ 21:22) >       PRINCIPAL PROCEDURE  Procedure: Ray amputation of toe  Findings and Treatment: Operative Findings  s/p LEFT foot Partial Fourth Ray Resection : at proximal resection bone was of good quality, no evidence of purulence or infection tracking proximally. Incision primarily closed      SECONDARY PROCEDURE  Procedure: XR foot left, 3 views  Findings and Treatment:   < end of copied text >  ACC: [id number] EXAM:  XR FOOT COMP MIN 3 VIEWS LT                        PROCEDURE DATE:  08/21/2022    INTERPRETATION:  CLINICAL INDICATION: Left fifth toe pain and discharge  TECHNIQUE.: 3 views of the left foot  COMPARISON: None available.  FINDINGS:  Status post amputation of the fifth ray to level of the mid metatarsal.   No acute fracture  There are erosive changes about the lateral aspect of the fourth   metatarsal head and proximal phalanx base.  Narrowing at the first metatarsophalangeal joint. Calcaneal   enthesophytes. Achilles insertional enthesophyte.  No evidence of subcutaneous gas.  IMPRESSION:  Erosive changes about the lateral aspect of the left fourth metatarsal   head and proximal phalanx are suspicious for osteomyelitis. MRI can be   obtained for further characterization.  --- End of Report ---  ATTILA TRAN MD; Resident Radiologist  This document has been electronically signed.  TRINH NOVOA MD; Attending Radiologist  This document has been electronically signed. Aug 21 2022  8:48AM< from: Xray Foot AP + Lateral + Oblique, Left (08.21.22 @ 06:17) >      Procedure: MR foot LT  Findings and Treatment: IMPRESSION:  1.  Septic arthritis/osteomyelitis centered at the fourth MTP, joint   owing to a lateral deep ulcer with soft tissue phlegmon extending to the   joint, with draining abscess to the skin at that level. Partial erosion   of the fourth metatarsal head is present as well as the base of the   fourth digit proximal phalanx. Osteomyelitis involves the entire distal   half of the fourth metatarsal and the fourth digit proximal phalanx.  2.  Reactive osteitis within the adjacent third metatarsal head.  3.  Status post transmetatarsal amputation of the fifth ray, without   stump osteomyelitis.< from: MR Foot w/wo IV Cont, Left (08.22.22 @ 21:22) >      Procedure: Ray amputation of toe  Findings and Treatment:

## 2022-08-22 NOTE — PROGRESS NOTE ADULT - ASSESSMENT
56 y/o man with MHX of IDDM c/b diabetic neuropathy (L small toe amputation March 2022), CAD s/p CABG (July 2022) , HTN who presents to the ED for 4 days of pain and discharge from his L small toe amputation site c/f osteomyelitis. Podiatry following.  58 y/o man with MHX of IDDM c/b diabetic neuropathy, s/p L 5th toe amputation 3/2022 for ?gangrene, HTN, and CAD s/p CABG 7/2022 who presented with 4 days of pain and discharge from his prior L 5th toe amputation site c/f osteomyelitis. Podiatry following.  56 y/o man with MHX of IDDM c/b diabetic neuropathy, s/p L 5th toe amputation 3/2022 for ?gangrene, HTN, and CAD s/p CABG 7/2022 who presented with 4 days of pain and discharge from his prior L 5th toe amputation site c/f osteomyelitis. Podiatry following.       Patient has a history of CABG in July 2022. He is having no active chest pain, able to walk 4 blocks and at least 1 flight of stairs without chest pain. EKG shows new t-wave abnormalities, unclear baseline since CABG. Revised cardiac risk index (RCRI) Class III with history of positive exercise stress test and pre-operative treatment with insulin. This equates to a 10.1% 30-day mortality risk. This is a low risk procedure, localized to the foot with local anesthesia and light IV sedation. Patient is medically optimized for surgery at this time.

## 2022-08-22 NOTE — DISCHARGE NOTE PROVIDER - HOSPITAL COURSE
57M PMH IDDM c/b diabetic neuropathy (L 5th toe amputation March 2022), HLD, HTN, CAD s/p CABG (July 2022) p/w 4 days pain/discharge L 5th toe amputation site a/w radiographic c/f OM (XR) evaluated by podiatry in ED initiated on vanc, cefepime, flagyl (dc'd given abscess Cx growth mod S. aureus, few GNR). MR foot revealing ___, podiatry recs for ___.     57M PMH IDDM c/b diabetic neuropathy (L 5th toe amputation March 2022), HLD, HTN, CAD s/p CABG (July 2022) p/w 4 days pain/discharge L 5th toe amputation site a/w radiographic c/f OM (XR) evaluated by podiatry in ED initiated on vanc, cefepime, flagyl (dc'd given abscess Cx growth mod S. aureus, few GNR). MR foot revealing osteomyelitis of 4th MTP, metatarsal head, and proximal phalanx as well as reactive osteitis of 3rd metatarsal head. Podiatry rec______. Infectious disease rec ______.      57M PMH IDDM c/b diabetic neuropathy (L 5th toe amputation March 2022), HLD, HTN, CAD s/p CABG (July 2022) p/w 4 days pain/discharge L 5th toe amputation site a/w radiographic c/f OM (XR) evaluated by podiatry in ED initiated on vanc, cefepime, flagyl (dc'd given abscess Cx growth mod S. aureus, few GNR). MR foot revealing osteomyelitis of 4th MTP, metatarsal head, and proximal phalanx as well as reactive osteitis of 3rd metatarsal head. Final wound culture sig for ESBL E. coli and MRSA. Patient started on meropenem and continued on vancomycin, dosed by trough. Pseudomonas negative, switch from Meropeneum to Ertapenem per ID. Podiatry performed LEFT foot Partial Fourth Ray Resection on 8/25. Recommended monitoring of intra-op culture for 48 hours. Infectious disease recommended continued abx for 48 hours after procedure and d/c abx on discharge. 48 intra-op culture showed _____.     Follow-up outpatient:  ?vascular surgery       57M PMH IDDM c/b diabetic neuropathy (L 5th toe amputation March 2022), HLD, HTN, CAD s/p CABG (July 2022) p/w 4 days pain/discharge L 5th toe amputation site a/w radiographic c/f OM (XR) evaluated by podiatry in ED initiated on vanc, cefepime, flagyl (dc'd given abscess Cx growth mod S. aureus, few GNR). MR foot revealing osteomyelitis of 4th MTP, metatarsal head, and proximal phalanx as well as reactive osteitis of 3rd metatarsal head. Final wound culture sig for ESBL E. coli and MRSA. Patient started on meropenem and continued on vancomycin, dosed by trough. Pseudomonas negative, switch from Meropeneum to Ertapenem per ID. Podiatry performed LEFT foot Partial Fourth Ray Resection on 8/25. Recommended monitoring of intra-op culture for 48 hours. Infectious disease recommended continued abx for 48 hours after procedure and d/c abx on discharge. 48 intra-op culture w/o growth     Of note 8/26 pt reported 2 week onset burning L upper chest pain which he reports was result of pushing heavy object, w/o jaw/arm/back pain or palpitations. He refused EKG and therefore will monitor sx and f/u as outpt.     Pt HD stable for dc home w/ PCP, ophtho, pods, ID f/u     Case and plan d/w Dr. Corbin        57M PMH IDDM c/b diabetic neuropathy (L 5th toe amputation March 2022), HLD, HTN, CAD s/p CABG (July 2022) p/w 4 days pain/discharge L 5th toe amputation site a/w radiographic c/f OM (XR) evaluated by podiatry in ED initiated on vanc, cefepime, flagyl (dc'd given abscess Cx growth mod S. aureus, few GNR). MR foot revealing osteomyelitis of 4th MTP, metatarsal head, and proximal phalanx as well as reactive osteitis of 3rd metatarsal head. Final wound culture sig for ESBL E. coli and MRSA. Patient started on meropenem and continued on vancomycin, dosed by trough. Pseudomonas negative, switch from Meropeneum to Ertapenem per ID. Podiatry performed LEFT foot Partial Fourth Ray Resection on 8/25. Recommended monitoring of intra-op culture for 48 hours. Infectious disease recommended continued abx for 48 hours after procedure and d/c abx on discharge. 48 hours intra-op culture w/o growth .    Of note 8/26 pt reported 2 week onset burning L upper chest pain which he reports was result of pushing heavy object, w/o jaw/arm/back pain or palpitations. He refused EKG and therefore will monitor sx and f/u as outpt.     Pt HD stable for dc home w/ PCP, ophthalmology, podiatry, ID f/u     Case and plan d/w Dr. Corbin        57M PMH IDDM c/b diabetic neuropathy (L 5th toe amputation March 2022), HLD, HTN, CAD s/p CABG (July 2022) p/w 4 days pain/discharge L 5th toe amputation site a/w radiographic c/f OM (XR) evaluated by podiatry in ED initiated on vanc, cefepime, flagyl (dc'd given abscess Cx growth mod S. aureus, few GNR). MR foot revealing osteomyelitis of 4th MTP, metatarsal head, and proximal phalanx as well as reactive osteitis of 3rd metatarsal head. Final wound culture sig for ESBL E. coli and MRSA. Patient started on meropenem and continued on vancomycin, dosed by trough. Pseudomonas negative, switch from Meropeneum to Ertapenem per ID. Podiatry performed LEFT foot Partial Fourth Ray Resection on 8/25. Recommended monitoring of intra-op culture for 48 hours. Infectious disease recommended continued abx for 48 hours after procedure and d/c abx on discharge. 48 hours intra-op culture w/o growth .    Of note 8/26 pt reported 2 week onset burning L upper chest pain which he reports was result of pushing heavy object, w/o jaw/arm/back pain or palpitations. He refused EKG and therefore will monitor sx and f/u as outpt.     Pt HD stable for dc home w/ PCP for management of diabetes within 1 week, podiatry in 1 week, ophthalmology, and ID f/u     Case and plan d/w Dr. Corbin

## 2022-08-22 NOTE — DISCHARGE NOTE PROVIDER - NSDCMRMEDTOKEN_GEN_ALL_CORE_FT
amLODIPine 5 mg oral tablet: 1 tab(s) orally once a day  aspirin 81 mg oral tablet: orally once a day  atorvastatin 80 mg oral tablet: 1 tab(s) orally once a day  carvedilol 12.5 mg oral tablet: 1 tab(s) orally 2 times a day  ferrous sulfate 325 mg (65 mg elemental iron) oral delayed release tablet: 1 tab(s) orally once a day  folic acid 1 mg oral tablet: 1 tab(s) orally once a day  furosemide 40 mg oral tablet: 1 tab(s) orally once a day  gabapentin 100 mg oral capsule: 1 cap(s) orally 3 times a day  HumaLO unit(s) injectable 3 times a day (with meals)  levothyroxine 100 mcg (0.1 mg) oral tablet: 1 tab(s) orally once a day  losartan 25 mg oral tablet: 1 tab(s) orally once a day  magnesium oxide:   metFORMIN 500 mg oral tablet: 1 tab(s) orally 2 times a day  NIFEdipine 60 mg oral tablet, extended release: 1 tab(s) orally once a day  Ozempic 2 mg/1.5 mL (0.25 mg or 0.5 mg dose) subcutaneous solution: 0.5 milligram(s) subcutaneous once a week  potassium chloride 20 mEq oral tablet, extended release: 1 tab(s) orally once a day  Senna 8.8 mg/5 mL oral syrup: 20 milliliter(s) orally 2 times a day  Tresiba: 18 unit(s) subcutaneous once a day (at bedtime)   amLODIPine 5 mg oral tablet: 1 tab(s) orally once a day  aspirin 81 mg oral tablet: orally once a day  atorvastatin 80 mg oral tablet: 1 tab(s) orally once a day  carvedilol 12.5 mg oral tablet: 1 tab(s) orally 2 times a day  ferrous sulfate 325 mg (65 mg elemental iron) oral delayed release tablet: 1 tab(s) orally once a day  folic acid 1 mg oral tablet: 1 tab(s) orally once a day  furosemide 40 mg oral tablet: 1 tab(s) orally once a day  gabapentin 100 mg oral capsule: 1 cap(s) orally 3 times a day  HumaLO unit(s) injectable 3 times a day (with meals)  levothyroxine 100 mcg (0.1 mg) oral tablet: 1 tab(s) orally once a day  losartan 25 mg oral tablet: 1 tab(s) orally once a day  metFORMIN 500 mg oral tablet: 1 tab(s) orally 2 times a day  Ozempic 2 mg/1.5 mL (0.25 mg or 0.5 mg dose) subcutaneous solution: 0.5 milligram(s) subcutaneous once a week  Senna 8.8 mg/5 mL oral syrup: 20 milliliter(s) orally 2 times a day  Tresiba: 18 unit(s) subcutaneous once a day (at bedtime)   amLODIPine 5 mg oral tablet: 1 tab(s) orally once a day  aspirin 81 mg oral tablet: orally once a day  atorvastatin 80 mg oral tablet: 1 tab(s) orally once a day  carvedilol 12.5 mg oral tablet: 1 tab(s) orally 2 times a day  ferrous sulfate 325 mg (65 mg elemental iron) oral delayed release tablet: 1 tab(s) orally once a day  folic acid 1 mg oral tablet: 1 tab(s) orally once a day  furosemide 40 mg oral tablet: 1 tab(s) orally once a day  gabapentin 100 mg oral capsule: 1 cap(s) orally 3 times a day  HumaLO unit(s) injectable 3 times a day (with meals)  levothyroxine 100 mcg (0.1 mg) oral tablet: 1 tab(s) orally once a day  losartan 25 mg oral tablet: 1 tab(s) orally once a day  metFORMIN 500 mg oral tablet: 1 tab(s) orally 2 times a day  Senna 8.8 mg/5 mL oral syrup: 20 milliliter(s) orally 2 times a day  Tresiba: 17 unit(s) subcutaneous once a day (at bedtime)   amLODIPine 5 mg oral tablet: 1 tab(s) orally once a day  aspirin 81 mg oral tablet: orally once a day  atorvastatin 80 mg oral tablet: 1 tab(s) orally once a day  carvedilol 12.5 mg oral tablet: 1 tab(s) orally 2 times a day  ferrous sulfate 325 mg (65 mg elemental iron) oral delayed release tablet: 1 tab(s) orally once a day  folic acid 1 mg oral tablet: 1 tab(s) orally once a day  furosemide 40 mg oral tablet: 1 tab(s) orally once a day  gabapentin 100 mg oral capsule: 1 cap(s) orally 3 times a day  HumaLO unit(s) injectable 3 times a day (with meals)  levothyroxine 100 mcg (0.1 mg) oral tablet: 1 tab(s) orally once a day  losartan 25 mg oral tablet: 1 tab(s) orally once a day  metFORMIN 500 mg oral tablet: 1 tab(s) orally 2 times a day  Physical Therapy Z72.3:   Physical Therapy Z72.3:   Rolling Walker Z72.3:   Rolling Walker Z72.3:   Senna 8.8 mg/5 mL oral syrup: 20 milliliter(s) orally 2 times a day  Shower Chair Z72.3:   Tresiba: 17 unit(s) subcutaneous once a day (at bedtime)   amLODIPine 5 mg oral tablet: 1 tab(s) orally once a day  aspirin 81 mg oral tablet: orally once a day  atorvastatin 80 mg oral tablet: 1 tab(s) orally once a day  carvedilol 12.5 mg oral tablet: 1 tab(s) orally 2 times a day  ferrous sulfate 325 mg (65 mg elemental iron) oral delayed release tablet: 1 tab(s) orally once a day  folic acid 1 mg oral tablet: 1 tab(s) orally once a day  furosemide 40 mg oral tablet: 1 tab(s) orally once a day  gabapentin 100 mg oral capsule: 1 cap(s) orally 3 times a day  HumaLO unit(s) injectable 3 times a day (with meals)  levothyroxine 100 mcg (0.1 mg) oral tablet: 1 tab(s) orally once a day  losartan 25 mg oral tablet: 1 tab(s) orally once a day  metFORMIN 500 mg oral tablet: 1 tab(s) orally 2 times a day  Physical Therapy Z72.3:   Rolling Walker Z72.3:   Senna 8.8 mg/5 mL oral syrup: 20 milliliter(s) orally 2 times a day  Shower Chair Z72.3:   Tresiba: 17 unit(s) subcutaneous once a day (at bedtime)   amLODIPine 5 mg oral tablet: 1 tab(s) orally once a day  aspirin 81 mg oral tablet: orally once a day  ferrous sulfate 325 mg (65 mg elemental iron) oral delayed release tablet: 1 tab(s) orally once a day  folic acid 1 mg oral tablet: 1 tab(s) orally once a day  HumaLO unit(s) injectable 3 times a day (with meals)  levothyroxine 100 mcg (0.1 mg) oral tablet: 1 tab(s) orally once a day  metFORMIN 500 mg oral tablet: 1 tab(s) orally 2 times a day  Physical Therapy Z72.3:   Rolling Walker Z72.3:   Senna 8.8 mg/5 mL oral syrup: 20 milliliter(s) orally 2 times a day  Shower Chair Z72.3:   Tresiba: 17 unit(s) subcutaneous once a day (at bedtime)

## 2022-08-22 NOTE — PROGRESS NOTE ADULT - PROBLEM SELECTOR PLAN 3
On Amlodipine 5 mg QD at home per patient. BP in 140s/80s. Per med rec from pharmacy, also on Carvedilol 12.5 mg BID, Furosemide 40 mg QD, nifedipine 60 mg QD and Losartan 25 mg QD  - continue amlodipine, carvedilol, furosemide, losartan

## 2022-08-22 NOTE — PROGRESS NOTE ADULT - PROBLEM SELECTOR PLAN 6
No reported hypothyroidism by patient. Per pharmacy, patient is on levothyroxine 0.1 mg QD.   - obtain collateral to confirm if possible, patient is poor historian  - continue home dose once confirmed hypomagnesemia and hypokalemia - correct as warranted

## 2022-08-22 NOTE — DISCHARGE NOTE PROVIDER - ATTENDING DISCHARGE PHYSICAL EXAMINATION:
Refer to Progress note  Spent 30 minutes discussing discharge instructions and medications with patient.

## 2022-08-22 NOTE — PROGRESS NOTE ADULT - SUBJECTIVE AND OBJECTIVE BOX
Patient is a 57y old  Male who presents with a chief complaint of pain/discharge from left small toe amputation site (22 Aug 2022 06:50)       INTERVAL HPI/OVERNIGHT EVENTS:  Patient seen and evaluated at bedside.  Pt is resting comfortable in NAD. Denies N/V/F/C.    Allergies    No Known Allergies    Intolerances        Vital Signs Last 24 Hrs  T(C): 36.2 (22 Aug 2022 05:17), Max: 36.9 (22 Aug 2022 00:56)  T(F): 97.1 (22 Aug 2022 05:17), Max: 98.4 (22 Aug 2022 00:56)  HR: 78 (22 Aug 2022 05:17) (78 - 100)  BP: 138/79 (22 Aug 2022 05:17) (123/68 - 151/88)  BP(mean): --  RR: 17 (22 Aug 2022 05:17) (16 - 18)  SpO2: 100% (22 Aug 2022 05:17) (99% - 100%)    Parameters below as of 22 Aug 2022 05:17  Patient On (Oxygen Delivery Method): room air        LABS:                        10.2   7.59  )-----------( 363      ( 22 Aug 2022 05:30 )             31.4     08-22    142  |  105  |  13  ----------------------------<  144<H>  4.1   |  21<L>  |  0.64    Ca    9.1      22 Aug 2022 05:30  Phos  3.6     08-22  Mg     1.60     08-22    TPro  7.1  /  Alb  3.0<L>  /  TBili  0.3  /  DBili  x   /  AST  19  /  ALT  10  /  AlkPhos  73  08-22        CAPILLARY BLOOD GLUCOSE      POCT Blood Glucose.: 108 mg/dL (22 Aug 2022 08:43)  POCT Blood Glucose.: 132 mg/dL (21 Aug 2022 22:51)  POCT Blood Glucose.: 163 mg/dL (21 Aug 2022 17:17)      Lower Extremity Physical Exam:  Vascular: DP/PT 2/4 B/L, CFT <3sec x 10, Temp gradient warm to warm of left foot, warm to cool of Right foot.    Neurology: Epicritic sensation decreased to level of forefoot, B/L  Musculoskeletal/Ortho: Pain on palpation over the lateral forefoot of the left foot.   Skin: Left foot fibronecrotic wound local to the lateral fourth ray, probes to bone and tracks medially and plantarly, No pus today, malodor mildly improved     RADIOLOGY & ADDITIONAL TESTS:

## 2022-08-22 NOTE — DISCHARGE NOTE PROVIDER - NSFOLLOWUPCLINICS_GEN_ALL_ED_FT
[facility] [place]  Internal Medicine  [address] [place] [address]  Phone: [phone]  Fax: [phone]     [facility] Endocrinology  Endocrinology  [address] [place] [address]  Phone: [phone]  Fax:     [facility] [place]  Internal Medicine  [address] [place] [address]  Phone: [phone]  Fax: [phone]    [facility] Ophthalmology  Ophthalmology  [address], [address]  [place] [address]  Phone: [phone]  Fax:     [facility]  Nephrology  [address] [place] [address], [address] [place] [address]  Phone: [phone]  Fax:      [facility] [place]  Internal Medicine  [address] [place] [address]  Phone: [phone]  Fax: [phone]    [facility] Ophthalmology  Ophthalmology  [address] [place] [address] [address]  [place] [address]  Phone: [phone]  Fax:      [facility] at [place]  Internal Medicine  [address] [place] [address]  Phone: [phone]  Fax: [phone]  Follow Up Time: 1 week    [facility] Ophthalmology  Ophthalmology  [address] [place] [address] [address]  [place] [address]  Phone: [phone]  Fax:

## 2022-08-23 LAB
-  AMIKACIN: SIGNIFICANT CHANGE UP
-  AMOXICILLIN/CLAVULANIC ACID: SIGNIFICANT CHANGE UP
-  AMPICILLIN/SULBACTAM: SIGNIFICANT CHANGE UP
-  AMPICILLIN/SULBACTAM: SIGNIFICANT CHANGE UP
-  AMPICILLIN: SIGNIFICANT CHANGE UP
-  AZTREONAM: SIGNIFICANT CHANGE UP
-  CEFAZOLIN: SIGNIFICANT CHANGE UP
-  CEFAZOLIN: SIGNIFICANT CHANGE UP
-  CEFEPIME: SIGNIFICANT CHANGE UP
-  CEFTRIAXONE: SIGNIFICANT CHANGE UP
-  CIPROFLOXACIN: SIGNIFICANT CHANGE UP
-  CLINDAMYCIN: SIGNIFICANT CHANGE UP
-  DAPTOMYCIN: SIGNIFICANT CHANGE UP
-  ERTAPENEM: SIGNIFICANT CHANGE UP
-  ERYTHROMYCIN: SIGNIFICANT CHANGE UP
-  GENTAMICIN: SIGNIFICANT CHANGE UP
-  GENTAMICIN: SIGNIFICANT CHANGE UP
-  LEVOFLOXACIN: SIGNIFICANT CHANGE UP
-  LINEZOLID: SIGNIFICANT CHANGE UP
-  MEROPENEM: SIGNIFICANT CHANGE UP
-  OXACILLIN: SIGNIFICANT CHANGE UP
-  PENICILLIN: SIGNIFICANT CHANGE UP
-  PIPERACILLIN/TAZOBACTAM: SIGNIFICANT CHANGE UP
-  RIFAMPIN: SIGNIFICANT CHANGE UP
-  TETRACYCLINE: SIGNIFICANT CHANGE UP
-  TOBRAMYCIN: SIGNIFICANT CHANGE UP
-  TRIMETHOPRIM/SULFAMETHOXAZOLE: SIGNIFICANT CHANGE UP
-  TRIMETHOPRIM/SULFAMETHOXAZOLE: SIGNIFICANT CHANGE UP
-  VANCOMYCIN: SIGNIFICANT CHANGE UP
ANION GAP SERPL CALC-SCNC: 11 MMOL/L — SIGNIFICANT CHANGE UP (ref 7–14)
BUN SERPL-MCNC: 10 MG/DL — SIGNIFICANT CHANGE UP (ref 7–23)
CALCIUM SERPL-MCNC: 9.2 MG/DL — SIGNIFICANT CHANGE UP (ref 8.4–10.5)
CHLORIDE SERPL-SCNC: 102 MMOL/L — SIGNIFICANT CHANGE UP (ref 98–107)
CO2 SERPL-SCNC: 28 MMOL/L — SIGNIFICANT CHANGE UP (ref 22–31)
CREAT SERPL-MCNC: 0.69 MG/DL — SIGNIFICANT CHANGE UP (ref 0.5–1.3)
EGFR: 107 ML/MIN/1.73M2 — SIGNIFICANT CHANGE UP
GLUCOSE BLDC GLUCOMTR-MCNC: 116 MG/DL — HIGH (ref 70–99)
GLUCOSE BLDC GLUCOMTR-MCNC: 212 MG/DL — HIGH (ref 70–99)
GLUCOSE BLDC GLUCOMTR-MCNC: 216 MG/DL — HIGH (ref 70–99)
GLUCOSE BLDC GLUCOMTR-MCNC: 218 MG/DL — HIGH (ref 70–99)
GLUCOSE BLDC GLUCOMTR-MCNC: 63 MG/DL — LOW (ref 70–99)
GLUCOSE BLDC GLUCOMTR-MCNC: 66 MG/DL — LOW (ref 70–99)
GLUCOSE SERPL-MCNC: 171 MG/DL — HIGH (ref 70–99)
HCT VFR BLD CALC: 35.6 % — LOW (ref 39–50)
HGB BLD-MCNC: 11.4 G/DL — LOW (ref 13–17)
MAGNESIUM SERPL-MCNC: 1.5 MG/DL — LOW (ref 1.6–2.6)
MCHC RBC-ENTMCNC: 29.8 PG — SIGNIFICANT CHANGE UP (ref 27–34)
MCHC RBC-ENTMCNC: 32 GM/DL — SIGNIFICANT CHANGE UP (ref 32–36)
MCV RBC AUTO: 93.2 FL — SIGNIFICANT CHANGE UP (ref 80–100)
METHOD TYPE: SIGNIFICANT CHANGE UP
METHOD TYPE: SIGNIFICANT CHANGE UP
NRBC # BLD: 0 /100 WBCS — SIGNIFICANT CHANGE UP (ref 0–0)
NRBC # FLD: 0 K/UL — SIGNIFICANT CHANGE UP (ref 0–0)
PHOSPHATE SERPL-MCNC: 4.4 MG/DL — SIGNIFICANT CHANGE UP (ref 2.5–4.5)
PLATELET # BLD AUTO: 414 K/UL — HIGH (ref 150–400)
POTASSIUM SERPL-MCNC: 3.2 MMOL/L — LOW (ref 3.5–5.3)
POTASSIUM SERPL-SCNC: 3.2 MMOL/L — LOW (ref 3.5–5.3)
RBC # BLD: 3.82 M/UL — LOW (ref 4.2–5.8)
RBC # FLD: 13.2 % — SIGNIFICANT CHANGE UP (ref 10.3–14.5)
SODIUM SERPL-SCNC: 141 MMOL/L — SIGNIFICANT CHANGE UP (ref 135–145)
VANCOMYCIN TROUGH SERPL-MCNC: 12.3 UG/ML — SIGNIFICANT CHANGE UP (ref 10–20)
WBC # BLD: 7.45 K/UL — SIGNIFICANT CHANGE UP (ref 3.8–10.5)
WBC # FLD AUTO: 7.45 K/UL — SIGNIFICANT CHANGE UP (ref 3.8–10.5)

## 2022-08-23 PROCEDURE — 99233 SBSQ HOSP IP/OBS HIGH 50: CPT | Mod: GC

## 2022-08-23 RX ORDER — POTASSIUM CHLORIDE 20 MEQ
40 PACKET (EA) ORAL EVERY 4 HOURS
Refills: 0 | Status: COMPLETED | OUTPATIENT
Start: 2022-08-23 | End: 2022-08-23

## 2022-08-23 RX ORDER — IBUPROFEN 200 MG
200 TABLET ORAL ONCE
Refills: 0 | Status: COMPLETED | OUTPATIENT
Start: 2022-08-23 | End: 2022-08-23

## 2022-08-23 RX ORDER — INSULIN GLARGINE 100 [IU]/ML
17 INJECTION, SOLUTION SUBCUTANEOUS AT BEDTIME
Refills: 0 | Status: DISCONTINUED | OUTPATIENT
Start: 2022-08-23 | End: 2022-08-24

## 2022-08-23 RX ORDER — INSULIN LISPRO 100/ML
5 VIAL (ML) SUBCUTANEOUS
Refills: 0 | Status: DISCONTINUED | OUTPATIENT
Start: 2022-08-23 | End: 2022-08-28

## 2022-08-23 RX ORDER — MEROPENEM 1 G/30ML
1000 INJECTION INTRAVENOUS EVERY 8 HOURS
Refills: 0 | Status: DISCONTINUED | OUTPATIENT
Start: 2022-08-23 | End: 2022-08-24

## 2022-08-23 RX ORDER — MAGNESIUM SULFATE 500 MG/ML
1 VIAL (ML) INJECTION ONCE
Refills: 0 | Status: COMPLETED | OUTPATIENT
Start: 2022-08-23 | End: 2022-08-23

## 2022-08-23 RX ORDER — ACETAMINOPHEN 500 MG
975 TABLET ORAL ONCE
Refills: 0 | Status: COMPLETED | OUTPATIENT
Start: 2022-08-23 | End: 2022-08-23

## 2022-08-23 RX ORDER — VANCOMYCIN HCL 1 G
1500 VIAL (EA) INTRAVENOUS EVERY 12 HOURS
Refills: 0 | Status: DISCONTINUED | OUTPATIENT
Start: 2022-08-23 | End: 2022-08-24

## 2022-08-23 RX ADMIN — Medication 100 GRAM(S): at 17:32

## 2022-08-23 RX ADMIN — Medication 40 MILLIEQUIVALENT(S): at 14:03

## 2022-08-23 RX ADMIN — Medication 1 MILLIGRAM(S): at 13:24

## 2022-08-23 RX ADMIN — CEFEPIME 100 MILLIGRAM(S): 1 INJECTION, POWDER, FOR SOLUTION INTRAMUSCULAR; INTRAVENOUS at 11:44

## 2022-08-23 RX ADMIN — Medication 325 MILLIGRAM(S): at 13:24

## 2022-08-23 RX ADMIN — Medication 100 MICROGRAM(S): at 05:27

## 2022-08-23 RX ADMIN — Medication 40 MILLIGRAM(S): at 05:27

## 2022-08-23 RX ADMIN — Medication 650 MILLIGRAM(S): at 15:42

## 2022-08-23 RX ADMIN — SENNA PLUS 2 TABLET(S): 8.6 TABLET ORAL at 21:26

## 2022-08-23 RX ADMIN — Medication 650 MILLIGRAM(S): at 16:12

## 2022-08-23 RX ADMIN — HEPARIN SODIUM 5000 UNIT(S): 5000 INJECTION INTRAVENOUS; SUBCUTANEOUS at 21:30

## 2022-08-23 RX ADMIN — INSULIN GLARGINE 17 UNIT(S): 100 INJECTION, SOLUTION SUBCUTANEOUS at 21:26

## 2022-08-23 RX ADMIN — CARVEDILOL PHOSPHATE 12.5 MILLIGRAM(S): 80 CAPSULE, EXTENDED RELEASE ORAL at 18:08

## 2022-08-23 RX ADMIN — CARVEDILOL PHOSPHATE 12.5 MILLIGRAM(S): 80 CAPSULE, EXTENDED RELEASE ORAL at 05:27

## 2022-08-23 RX ADMIN — Medication 40 MILLIEQUIVALENT(S): at 18:18

## 2022-08-23 RX ADMIN — Medication 81 MILLIGRAM(S): at 13:24

## 2022-08-23 RX ADMIN — GABAPENTIN 100 MILLIGRAM(S): 400 CAPSULE ORAL at 13:25

## 2022-08-23 RX ADMIN — GABAPENTIN 100 MILLIGRAM(S): 400 CAPSULE ORAL at 05:28

## 2022-08-23 RX ADMIN — HEPARIN SODIUM 5000 UNIT(S): 5000 INJECTION INTRAVENOUS; SUBCUTANEOUS at 13:25

## 2022-08-23 RX ADMIN — MEROPENEM 100 MILLIGRAM(S): 1 INJECTION INTRAVENOUS at 21:25

## 2022-08-23 RX ADMIN — Medication 975 MILLIGRAM(S): at 00:35

## 2022-08-23 RX ADMIN — Medication 975 MILLIGRAM(S): at 01:30

## 2022-08-23 RX ADMIN — GABAPENTIN 100 MILLIGRAM(S): 400 CAPSULE ORAL at 21:27

## 2022-08-23 RX ADMIN — Medication 2: at 18:09

## 2022-08-23 RX ADMIN — MEROPENEM 100 MILLIGRAM(S): 1 INJECTION INTRAVENOUS at 14:03

## 2022-08-23 RX ADMIN — Medication 300 MILLIGRAM(S): at 15:42

## 2022-08-23 RX ADMIN — LOSARTAN POTASSIUM 25 MILLIGRAM(S): 100 TABLET, FILM COATED ORAL at 05:27

## 2022-08-23 RX ADMIN — Medication 200 MILLIGRAM(S): at 16:26

## 2022-08-23 RX ADMIN — Medication 650 MILLIGRAM(S): at 22:42

## 2022-08-23 RX ADMIN — ATORVASTATIN CALCIUM 80 MILLIGRAM(S): 80 TABLET, FILM COATED ORAL at 21:26

## 2022-08-23 RX ADMIN — Medication 650 MILLIGRAM(S): at 21:42

## 2022-08-23 RX ADMIN — Medication 5 UNIT(S): at 13:26

## 2022-08-23 RX ADMIN — Medication 2: at 13:26

## 2022-08-23 RX ADMIN — Medication 5 UNIT(S): at 18:09

## 2022-08-23 RX ADMIN — AMLODIPINE BESYLATE 5 MILLIGRAM(S): 2.5 TABLET ORAL at 05:28

## 2022-08-23 RX ADMIN — HEPARIN SODIUM 5000 UNIT(S): 5000 INJECTION INTRAVENOUS; SUBCUTANEOUS at 05:27

## 2022-08-23 NOTE — PROVIDER CONTACT NOTE (HYPOGLYCEMIA EVENT) - NS PROVIDER CONTACT NOTE-TREATMENT-HYPO
2 4oz fruit juices immediately after (1 minute) blood sugar reading/4 oz Fruit Juice (Specify quantity, date/time)

## 2022-08-23 NOTE — PROGRESS NOTE ADULT - ASSESSMENT
56 y/o man with MHX of IDDM c/b diabetic neuropathy, s/p L 5th toe amputation 3/2022 for ?gangrene, HTN, and CAD s/p CABG 7/2022 who presented with 4 days of pain and discharge from his prior L 5th toe amputation site c/f osteomyelitis. Podiatry following.       Patient has a history of CABG in July 2022. He is having no active chest pain, able to walk 4 blocks and at least 1 flight of stairs without chest pain. EKG shows new t-wave abnormalities, unclear baseline since CABG. Revised cardiac risk index (RCRI) Class III with history of positive exercise stress test and pre-operative treatment with insulin. This equates to a 10.1% 30-day mortality risk. This is a low risk procedure, localized to the foot with local anesthesia and light IV sedation. Patient is medically optimized for surgery at this time.      58 y/o man with MHX of IDDM c/b diabetic neuropathy, s/p L 5th toe amputation 3/2022 for ?gangrene, HTN, and CAD s/p CABG 7/2022 who presented with 4 days of pain and discharge from his prior L 5th toe amputation site c/f osteomyelitis. Podiatry following.       Patient has a history of CABG in July 2022. He is having no active chest pain, able to walk 4 blocks and at least 1 flight of stairs without chest pain. Not volume overloaded EKG shows new t-wave abnormalities, unclear baseline since CABG. Revised cardiac risk index (RCRI) Class III with history of positive exercise stress test and pre-operative treatment with insulin. This equates to a 10.1% 30-day mortality risk. This is a low risk procedure, localized to the foot with local anesthesia and light IV sedation. Patient is medically optimized for surgery at this time.

## 2022-08-23 NOTE — PROGRESS NOTE ADULT - PROBLEM SELECTOR PLAN 9
Diet: Diabetic diet. Pending plan from Podiatry.   DVT ppx: lovenox  Dispo: admit to medicine Diet: Diabetic diet. Pending plan from Podiatry.   DVT ppx: lovenox  Dispo: admit to medicine    Patient has asked that the team not call his wife. If she is present physically when things are being discussed, that is okay.

## 2022-08-23 NOTE — PROGRESS NOTE ADULT - ATTENDING COMMENTS
56yo M with PMH of IDDM with neuropathy, s/p L 5th toe amputation 3/2022 for ?gangrene, HTN, and CAD s/p CABG 7/2022 who presented with 4 days of pain and discharge from the prior L 5th toe amputation site admitted for OM      #Left 4th metatarsal OM - Confirmed on MRI. podiatry following; inflammatory makers elevated. Wound cx growing ESBL ecoli and MRSA. C/w vancomycin and start ertapenem. Patient likely will need partial resection. Patient reports good exercise capacity since CABG, METS>4, no active chest pain and not overloaded on exam. Patient is medically optimized for planned procedure.   #IDDM – home regimen: Tresiba 18U qhs, Humalog 6-8U qac TID, Ozempic, metformin 500mg po BID. FS low this am. Decrease lantus to 17 and monitor FS.   #CAD s/p CABG – resume home antiplatelet agents and meds for GDMT.     Plan discussed with patient and HS 56yo M with PMH of IDDM with neuropathy, s/p L 5th toe amputation 3/2022 for ?gangrene, HTN, and CAD s/p CABG 7/2022 who presented with 4 days of pain and discharge from the prior L 5th toe amputation site admitted for OM      #Left 4th metatarsal OM - Confirmed on MRI. podiatry following; inflammatory makers elevated. Wound cx growing ESBL ecoli and MRSA. C/w vancomycin and start meropenem. Patient likely will need partial resection. Patient reports good exercise capacity since CABG, METS>4, no active chest pain and not overloaded on exam. Patient is medically optimized for planned procedure.   #IDDM – home regimen: Tresiba 18U qhs, Humalog 6-8U qac TID, Ozempic, metformin 500mg po BID. FS low this am. Decrease lantus to 17 and monitor FS.   #CAD s/p CABG – resume home antiplatelet agents and meds for GDMT.     Plan discussed with patient and HS

## 2022-08-23 NOTE — PROGRESS NOTE ADULT - PROBLEM SELECTOR PLAN 5
Hg 11.2 on arrival, decreased from early 2021.   - iron studies showed low total iron, low TIBC, % sat wnl, high ferritin, likely representing anemia of chronic disease.

## 2022-08-23 NOTE — PROGRESS NOTE ADULT - PROBLEM SELECTOR PLAN 7
No reported hypothyroidism by patient. Per pharmacy, patient is on levothyroxine 0.1 mg QD.   - continue home dose  - TSH to eval

## 2022-08-23 NOTE — PROGRESS NOTE ADULT - PROBLEM SELECTOR PLAN 2
On 27 units long-acting insulin once and 7 units short acting insulin with meals at home, per patient. Per pharmacy, pt rx Tresiba 18 units, Humalog 6-8 units with meals, and Ozempic. On Metformin 500 mg BID. 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 On gabapentin 100 mg TID. HgA1c 7.7%.   - 22 units long-acting, 7 units pre-meal bolus along with ISS  - continue gabapentin 100 mg TID On 27 units long-acting insulin once and 7 units short acting insulin with meals at home, per patient. Per pharmacy, pt rx Tresiba 18 units, Humalog 6-8 units with meals, and Ozempic. On Metformin 500 mg BID. 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 On gabapentin 100 mg TID. HgA1c 7.7%. POC BG 63 in AM on Lantus 22, pre-meal 5.   - decrease to 17 units long-acting, 5 units pre-meal bolus along with ISS and reassess based on how much ISS is needed  - continue gabapentin 100 mg TID  [] decrease Lantus by 1/2 the night before surgery. No pre-meal that morning since will be NPO.

## 2022-08-23 NOTE — PROGRESS NOTE ADULT - SUBJECTIVE AND OBJECTIVE BOX
DATE OF SERVICE: 08-23-22 @ 06:51    Patient is a 57y old  Male who presents with a chief complaint of pain/discharge from left small toe amputation site (22 Aug 2022 18:47)      SUBJECTIVE / OVERNIGHT EVENTS:    MEDICATIONS  (STANDING):  amLODIPine   Tablet 5 milliGRAM(s) Oral daily  aspirin  chewable 81 milliGRAM(s) Oral daily  atorvastatin 80 milliGRAM(s) Oral at bedtime  carvedilol 12.5 milliGRAM(s) Oral every 12 hours  cefepime   IVPB 2000 milliGRAM(s) IV Intermittent every 12 hours  dextrose 5%. 1000 milliLiter(s) (100 mL/Hr) IV Continuous <Continuous>  dextrose 5%. 1000 milliLiter(s) (50 mL/Hr) IV Continuous <Continuous>  dextrose 50% Injectable 25 Gram(s) IV Push once  dextrose 50% Injectable 12.5 Gram(s) IV Push once  dextrose 50% Injectable 25 Gram(s) IV Push once  ferrous    sulfate 325 milliGRAM(s) Oral daily  folic acid 1 milliGRAM(s) Oral daily  furosemide    Tablet 40 milliGRAM(s) Oral daily  gabapentin 100 milliGRAM(s) Oral three times a day  glucagon  Injectable 1 milliGRAM(s) IntraMuscular once  heparin   Injectable 5000 Unit(s) SubCutaneous every 8 hours  insulin glargine Injectable (LANTUS) 22 Unit(s) SubCutaneous at bedtime  insulin lispro (ADMELOG) corrective regimen sliding scale   SubCutaneous three times a day before meals  insulin lispro (ADMELOG) corrective regimen sliding scale   SubCutaneous at bedtime  insulin lispro Injectable (ADMELOG) 7 Unit(s) SubCutaneous three times a day before meals  levothyroxine 100 MICROGram(s) Oral daily  losartan 25 milliGRAM(s) Oral daily  senna 2 Tablet(s) Oral at bedtime  vancomycin  IVPB 1250 milliGRAM(s) IV Intermittent every 12 hours    MEDICATIONS  (PRN):  acetaminophen     Tablet .. 650 milliGRAM(s) Oral every 6 hours PRN Temp greater or equal to 38C (100.4F), Mild Pain (1 - 3)  dextrose Oral Gel 15 Gram(s) Oral once PRN Blood Glucose LESS THAN 70 milliGRAM(s)/deciliter  traMADol 50 milliGRAM(s) Oral every 6 hours PRN Severe Pain (7 - 10)      Vital Signs Last 24 Hrs  T(C): 36.7 (23 Aug 2022 05:18), Max: 36.9 (22 Aug 2022 21:58)  T(F): 98 (23 Aug 2022 05:18), Max: 98.5 (22 Aug 2022 21:58)  HR: 78 (23 Aug 2022 05:18) (75 - 84)  BP: 145/79 (23 Aug 2022 05:18) (142/78 - 157/86)  BP(mean): --  RR: 17 (23 Aug 2022 05:18) (17 - 18)  SpO2: 100% (23 Aug 2022 05:18) (96% - 100%)    Parameters below as of 23 Aug 2022 05:18  Patient On (Oxygen Delivery Method): room air      CAPILLARY BLOOD GLUCOSE      POCT Blood Glucose.: 106 mg/dL (22 Aug 2022 21:50)  POCT Blood Glucose.: 104 mg/dL (22 Aug 2022 17:53)  POCT Blood Glucose.: 165 mg/dL (22 Aug 2022 12:26)  POCT Blood Glucose.: 108 mg/dL (22 Aug 2022 08:43)    I&O's Summary      PHYSICAL EXAM:  GENERAL: NAD, well-developed  HEAD:  Atraumatic, Normocephalic  EYES: EOMI, PERRLA, conjunctiva and sclera clear  NECK: Supple, No JVD  CHEST/LUNG: Clear to auscultation bilaterally; No wheeze  HEART: Regular rate and rhythm; No murmurs, rubs, or gallops  ABDOMEN: Soft, Nontender, Nondistended; Bowel sounds present  EXTREMITIES:  2+ Peripheral Pulses, No clubbing, cyanosis, or edema  PSYCH: AAOx3  NEUROLOGY: non-focal  SKIN: No rashes or lesions    LABS:                        10.2   7.59  )-----------( 363      ( 22 Aug 2022 05:30 )             31.4     08-22    142  |  105  |  13  ----------------------------<  144<H>  4.1   |  21<L>  |  0.64    Ca    9.1      22 Aug 2022 05:30  Phos  3.6     08-22  Mg     1.60     08-22    TPro  7.1  /  Alb  3.0<L>  /  TBili  0.3  /  DBili  x   /  AST  19  /  ALT  10  /  AlkPhos  73  08-22              RADIOLOGY & ADDITIONAL TESTS:    Imaging Personally Reviewed:    Consultant(s) Notes Reviewed:      Care Discussed with Consultants/Other Providers:   DATE OF SERVICE: 08-23-22 @ 06:51    Patient is a 57y old  Male who presents with a chief complaint of pain/discharge from left small toe amputation site (22 Aug 2022 18:47)      SUBJECTIVE / OVERNIGHT EVENTS: No acute events overnight. Patient received Tylenol and motrin for pain overnight with relief. Patient states that his insulin sensor on his left arm was removed yesterday prior to MRI. No CP, SOB, N/V, dizziness.     MEDICATIONS  (STANDING):  amLODIPine   Tablet 5 milliGRAM(s) Oral daily  aspirin  chewable 81 milliGRAM(s) Oral daily  atorvastatin 80 milliGRAM(s) Oral at bedtime  carvedilol 12.5 milliGRAM(s) Oral every 12 hours  cefepime   IVPB 2000 milliGRAM(s) IV Intermittent every 12 hours  dextrose 5%. 1000 milliLiter(s) (100 mL/Hr) IV Continuous <Continuous>  dextrose 5%. 1000 milliLiter(s) (50 mL/Hr) IV Continuous <Continuous>  dextrose 50% Injectable 25 Gram(s) IV Push once  dextrose 50% Injectable 12.5 Gram(s) IV Push once  dextrose 50% Injectable 25 Gram(s) IV Push once  ferrous    sulfate 325 milliGRAM(s) Oral daily  folic acid 1 milliGRAM(s) Oral daily  furosemide    Tablet 40 milliGRAM(s) Oral daily  gabapentin 100 milliGRAM(s) Oral three times a day  glucagon  Injectable 1 milliGRAM(s) IntraMuscular once  heparin   Injectable 5000 Unit(s) SubCutaneous every 8 hours  insulin glargine Injectable (LANTUS) 22 Unit(s) SubCutaneous at bedtime  insulin lispro (ADMELOG) corrective regimen sliding scale   SubCutaneous three times a day before meals  insulin lispro (ADMELOG) corrective regimen sliding scale   SubCutaneous at bedtime  insulin lispro Injectable (ADMELOG) 7 Unit(s) SubCutaneous three times a day before meals  levothyroxine 100 MICROGram(s) Oral daily  losartan 25 milliGRAM(s) Oral daily  senna 2 Tablet(s) Oral at bedtime  vancomycin  IVPB 1250 milliGRAM(s) IV Intermittent every 12 hours    MEDICATIONS  (PRN):  acetaminophen     Tablet .. 650 milliGRAM(s) Oral every 6 hours PRN Temp greater or equal to 38C (100.4F), Mild Pain (1 - 3)  dextrose Oral Gel 15 Gram(s) Oral once PRN Blood Glucose LESS THAN 70 milliGRAM(s)/deciliter  traMADol 50 milliGRAM(s) Oral every 6 hours PRN Severe Pain (7 - 10)      Vital Signs Last 24 Hrs  T(C): 36.7 (23 Aug 2022 05:18), Max: 36.9 (22 Aug 2022 21:58)  T(F): 98 (23 Aug 2022 05:18), Max: 98.5 (22 Aug 2022 21:58)  HR: 78 (23 Aug 2022 05:18) (75 - 84)  BP: 145/79 (23 Aug 2022 05:18) (142/78 - 157/86)  BP(mean): --  RR: 17 (23 Aug 2022 05:18) (17 - 18)  SpO2: 100% (23 Aug 2022 05:18) (96% - 100%)    Parameters below as of 23 Aug 2022 05:18  Patient On (Oxygen Delivery Method): room air      CAPILLARY BLOOD GLUCOSE      POCT Blood Glucose.: 106 mg/dL (22 Aug 2022 21:50)  POCT Blood Glucose.: 104 mg/dL (22 Aug 2022 17:53)  POCT Blood Glucose.: 165 mg/dL (22 Aug 2022 12:26)  POCT Blood Glucose.: 108 mg/dL (22 Aug 2022 08:43)    I&O's Summary      PHYSICAL EXAM:  GENERAL: NAD, well-developed  HEAD:  Atraumatic, Normocephalic  EYES: EOMI, PERRLA, conjunctiva and sclera clear  NECK: Supple, No JVD  CHEST/LUNG: Clear to auscultation bilaterally; No wheeze  HEART: Regular rate and rhythm; No murmurs, rubs, or gallops  ABDOMEN: Soft, Nontender, Nondistended; Bowel sounds present  EXTREMITIES:  2+ Peripheral Pulses except R PT and DP 1+, No clubbing, cyanosis, or edema. Open wound present at left 5th toe amputation site.   PSYCH: AAOx3  NEUROLOGY: non-focal. Absent proprioception of R 1st toe, intact L. Decreased sensation to light touch up to mid foot on R and up to above the ankle on L.   SKIN: No rashes or lesions    LABS:                        10.2   7.59  )-----------( 363      ( 22 Aug 2022 05:30 )             31.4     08-22    142  |  105  |  13  ----------------------------<  144<H>  4.1   |  21<L>  |  0.64    Ca    9.1      22 Aug 2022 05:30  Phos  3.6     08-22  Mg     1.60     08-22    TPro  7.1  /  Alb  3.0<L>  /  TBili  0.3  /  DBili  x   /  AST  19  /  ALT  10  /  AlkPhos  73  08-22              RADIOLOGY & ADDITIONAL TESTS:    Imaging Personally Reviewed:    Consultant(s) Notes Reviewed:      Care Discussed with Consultants/Other Providers:

## 2022-08-23 NOTE — PROVIDER CONTACT NOTE (HYPOGLYCEMIA EVENT) - NS PROVIDER CONTACT BACKGROUND-HYPO
Age: 57y    Gender: Male    POCT Blood Glucose:  218 mg/dL (08-23-22 @ 17:48)  216 mg/dL (08-23-22 @ 12:33)  116 mg/dL (08-23-22 @ 08:56)  63 mg/dL (08-23-22 @ 08:23)  66 mg/dL (08-23-22 @ 08:21)  106 mg/dL (08-22-22 @ 21:50)      eMAR:atorvastatin   80 milliGRAM(s) Oral (08-22-22 @ 22:04)    insulin glargine Injectable (LANTUS)   22 Unit(s) SubCutaneous (08-22-22 @ 22:06)    insulin lispro (ADMELOG) corrective regimen sliding scale   2 Unit(s) SubCutaneous (08-23-22 @ 18:09)   2 Unit(s) SubCutaneous (08-23-22 @ 13:26)    insulin lispro Injectable (ADMELOG)   5 Unit(s) SubCutaneous (08-23-22 @ 18:09)   5 Unit(s) SubCutaneous (08-23-22 @ 13:26)    levothyroxine   100 MICROGram(s) Oral (08-23-22 @ 05:27)

## 2022-08-23 NOTE — PROGRESS NOTE ADULT - PROBLEM SELECTOR PLAN 1
Subacute pain and discharge from amputation site of L small toe. Probe to bone on exam on podiatric exam. XR c/f osteomyelitis of left fourth metatarsal head and proximal phalanx. CRP 86, ESR 80. WBC 10.28. cefepime 2g IVPB, clindamycin 900 mg IVPB, vancomycin 1g IVPB given in ED.  - continue vancomycin, cefepime for empiric treatment of osteomyelitis in a diabetic patient  - d/c flagyl given prelim wound culture negative for anaerobes  - MRI schedule for today to further assess for extent of osteomyelitis per podiatry  - Podiatry: likely partial 4th resection of left foot pending MRI results  - Wound culture prelim: mod staph aureus and gram neg rods Subacute pain and discharge from amputation site of L small toe. Probe to bone on exam on podiatric exam. XR c/f osteomyelitis of left fourth metatarsal head and proximal phalanx. CRP 86, ESR 80. WBC 10.28. cefepime 2g IVPB, clindamycin 900 mg IVPB, vancomycin 1g IVPB given in ED.  - Day 3 vancomycin, cefepime for empiric treatment of osteomyelitis in a diabetic patient  - d/c flagyl given prelim wound culture negative for anaerobes  - MRI L foot: involvement of 4th MTP, 4th metatarsal head, base of 4th proximal phalanx. No stump osteomyelitis. Reactive osteitis of 3rd metatarsal head.   - Podiatry: likely partial 4th resection of left foot pending MRI results. F/u recs. If patient refuses surgery, will need ID consult.   - Wound culture prelim: mod staph aureus and gram neg rods  [] f/u final wound culture  [] vanc trough 12.3 8/23 in AM Subacute pain and discharge from amputation site of L small toe. Probe to bone on exam on podiatric exam. XR c/f osteomyelitis of left fourth metatarsal head and proximal phalanx. CRP 86, ESR 80. WBC 10.28. cefepime 2g IVPB, clindamycin 900 mg IVPB, vancomycin 1g IVPB given in ED.  - 3 days vancomycin, cefepime for empiric treatment of osteomyelitis in a diabetic patient. Switched to vancomycin and meropenem based on sensitivities for MRSA and ESBL E. coli from final wound culture.   - d/c flagyl given prelim wound culture negative for anaerobes  - MRI L foot: involvement of 4th MTP, 4th metatarsal head, base of 4th proximal phalanx. No stump osteomyelitis. Reactive osteitis of 3rd metatarsal head.   - Podiatry: likely partial 4th resection of left foot pending MRI results. F/u recs. If patient refuses surgery, will need ID consult.   - Wound culture final: MRSA and ESBL E. coli  [] f/u final wound culture  [] vanc trough 12.3 8/23 in AM

## 2022-08-24 ENCOUNTER — TRANSCRIPTION ENCOUNTER (OUTPATIENT)
Age: 57
End: 2022-08-24

## 2022-08-24 ENCOUNTER — RESULT REVIEW (OUTPATIENT)
Age: 57
End: 2022-08-24

## 2022-08-24 LAB
ANION GAP SERPL CALC-SCNC: 10 MMOL/L — SIGNIFICANT CHANGE UP (ref 7–14)
BUN SERPL-MCNC: 10 MG/DL — SIGNIFICANT CHANGE UP (ref 7–23)
CALCIUM SERPL-MCNC: 9.2 MG/DL — SIGNIFICANT CHANGE UP (ref 8.4–10.5)
CHLORIDE SERPL-SCNC: 104 MMOL/L — SIGNIFICANT CHANGE UP (ref 98–107)
CO2 SERPL-SCNC: 27 MMOL/L — SIGNIFICANT CHANGE UP (ref 22–31)
CREAT SERPL-MCNC: 0.74 MG/DL — SIGNIFICANT CHANGE UP (ref 0.5–1.3)
EGFR: 105 ML/MIN/1.73M2 — SIGNIFICANT CHANGE UP
GLUCOSE BLDC GLUCOMTR-MCNC: 128 MG/DL — HIGH (ref 70–99)
GLUCOSE BLDC GLUCOMTR-MCNC: 161 MG/DL — HIGH (ref 70–99)
GLUCOSE BLDC GLUCOMTR-MCNC: 193 MG/DL — HIGH (ref 70–99)
GLUCOSE BLDC GLUCOMTR-MCNC: 218 MG/DL — HIGH (ref 70–99)
GLUCOSE SERPL-MCNC: 87 MG/DL — SIGNIFICANT CHANGE UP (ref 70–99)
HCT VFR BLD CALC: 32.1 % — LOW (ref 39–50)
HGB BLD-MCNC: 10.2 G/DL — LOW (ref 13–17)
MAGNESIUM SERPL-MCNC: 1.6 MG/DL — SIGNIFICANT CHANGE UP (ref 1.6–2.6)
MCHC RBC-ENTMCNC: 29.4 PG — SIGNIFICANT CHANGE UP (ref 27–34)
MCHC RBC-ENTMCNC: 31.8 GM/DL — LOW (ref 32–36)
MCV RBC AUTO: 92.5 FL — SIGNIFICANT CHANGE UP (ref 80–100)
NRBC # BLD: 0 /100 WBCS — SIGNIFICANT CHANGE UP (ref 0–0)
NRBC # FLD: 0 K/UL — SIGNIFICANT CHANGE UP (ref 0–0)
PHOSPHATE SERPL-MCNC: 3.5 MG/DL — SIGNIFICANT CHANGE UP (ref 2.5–4.5)
PLATELET # BLD AUTO: 410 K/UL — HIGH (ref 150–400)
POTASSIUM SERPL-MCNC: 3.5 MMOL/L — SIGNIFICANT CHANGE UP (ref 3.5–5.3)
POTASSIUM SERPL-SCNC: 3.5 MMOL/L — SIGNIFICANT CHANGE UP (ref 3.5–5.3)
RBC # BLD: 3.47 M/UL — LOW (ref 4.2–5.8)
RBC # FLD: 13.2 % — SIGNIFICANT CHANGE UP (ref 10.3–14.5)
SODIUM SERPL-SCNC: 141 MMOL/L — SIGNIFICANT CHANGE UP (ref 135–145)
TSH SERPL-MCNC: 3.92 UIU/ML — SIGNIFICANT CHANGE UP (ref 0.27–4.2)
WBC # BLD: 7.88 K/UL — SIGNIFICANT CHANGE UP (ref 3.8–10.5)
WBC # FLD AUTO: 7.88 K/UL — SIGNIFICANT CHANGE UP (ref 3.8–10.5)

## 2022-08-24 PROCEDURE — 99233 SBSQ HOSP IP/OBS HIGH 50: CPT | Mod: GC

## 2022-08-24 PROCEDURE — 71045 X-RAY EXAM CHEST 1 VIEW: CPT | Mod: 26

## 2022-08-24 PROCEDURE — 99255 IP/OBS CONSLTJ NEW/EST HI 80: CPT

## 2022-08-24 RX ORDER — ACETAMINOPHEN 500 MG
975 TABLET ORAL EVERY 8 HOURS
Refills: 0 | Status: DISCONTINUED | OUTPATIENT
Start: 2022-08-24 | End: 2022-08-26

## 2022-08-24 RX ORDER — INSULIN GLARGINE 100 [IU]/ML
9 INJECTION, SOLUTION SUBCUTANEOUS AT BEDTIME
Refills: 0 | Status: DISCONTINUED | OUTPATIENT
Start: 2022-08-24 | End: 2022-08-25

## 2022-08-24 RX ORDER — VANCOMYCIN HCL 1 G
1250 VIAL (EA) INTRAVENOUS EVERY 12 HOURS
Refills: 0 | Status: COMPLETED | OUTPATIENT
Start: 2022-08-24 | End: 2022-08-27

## 2022-08-24 RX ORDER — ERTAPENEM SODIUM 1 G/1
1000 INJECTION, POWDER, LYOPHILIZED, FOR SOLUTION INTRAMUSCULAR; INTRAVENOUS EVERY 24 HOURS
Refills: 0 | Status: COMPLETED | OUTPATIENT
Start: 2022-08-24 | End: 2022-08-27

## 2022-08-24 RX ADMIN — MEROPENEM 100 MILLIGRAM(S): 1 INJECTION INTRAVENOUS at 05:41

## 2022-08-24 RX ADMIN — Medication 1 MILLIGRAM(S): at 13:17

## 2022-08-24 RX ADMIN — LOSARTAN POTASSIUM 25 MILLIGRAM(S): 100 TABLET, FILM COATED ORAL at 05:42

## 2022-08-24 RX ADMIN — CARVEDILOL PHOSPHATE 12.5 MILLIGRAM(S): 80 CAPSULE, EXTENDED RELEASE ORAL at 05:40

## 2022-08-24 RX ADMIN — Medication 166.67 MILLIGRAM(S): at 18:23

## 2022-08-24 RX ADMIN — TRAMADOL HYDROCHLORIDE 50 MILLIGRAM(S): 50 TABLET ORAL at 01:15

## 2022-08-24 RX ADMIN — Medication 325 MILLIGRAM(S): at 13:16

## 2022-08-24 RX ADMIN — CARVEDILOL PHOSPHATE 12.5 MILLIGRAM(S): 80 CAPSULE, EXTENDED RELEASE ORAL at 17:45

## 2022-08-24 RX ADMIN — Medication 81 MILLIGRAM(S): at 13:16

## 2022-08-24 RX ADMIN — Medication 975 MILLIGRAM(S): at 22:44

## 2022-08-24 RX ADMIN — Medication 5 UNIT(S): at 08:53

## 2022-08-24 RX ADMIN — HEPARIN SODIUM 5000 UNIT(S): 5000 INJECTION INTRAVENOUS; SUBCUTANEOUS at 13:22

## 2022-08-24 RX ADMIN — MEROPENEM 100 MILLIGRAM(S): 1 INJECTION INTRAVENOUS at 13:24

## 2022-08-24 RX ADMIN — GABAPENTIN 100 MILLIGRAM(S): 400 CAPSULE ORAL at 05:40

## 2022-08-24 RX ADMIN — HEPARIN SODIUM 5000 UNIT(S): 5000 INJECTION INTRAVENOUS; SUBCUTANEOUS at 05:41

## 2022-08-24 RX ADMIN — Medication 100 MICROGRAM(S): at 05:40

## 2022-08-24 RX ADMIN — ATORVASTATIN CALCIUM 80 MILLIGRAM(S): 80 TABLET, FILM COATED ORAL at 21:42

## 2022-08-24 RX ADMIN — Medication 1: at 17:45

## 2022-08-24 RX ADMIN — Medication 5 UNIT(S): at 13:15

## 2022-08-24 RX ADMIN — Medication 300 MILLIGRAM(S): at 05:41

## 2022-08-24 RX ADMIN — Medication 40 MILLIGRAM(S): at 05:40

## 2022-08-24 RX ADMIN — GABAPENTIN 100 MILLIGRAM(S): 400 CAPSULE ORAL at 21:43

## 2022-08-24 RX ADMIN — ERTAPENEM SODIUM 120 MILLIGRAM(S): 1 INJECTION, POWDER, LYOPHILIZED, FOR SOLUTION INTRAMUSCULAR; INTRAVENOUS at 21:38

## 2022-08-24 RX ADMIN — INSULIN GLARGINE 9 UNIT(S): 100 INJECTION, SOLUTION SUBCUTANEOUS at 21:43

## 2022-08-24 RX ADMIN — Medication 5 UNIT(S): at 17:44

## 2022-08-24 RX ADMIN — Medication 1: at 13:15

## 2022-08-24 RX ADMIN — TRAMADOL HYDROCHLORIDE 50 MILLIGRAM(S): 50 TABLET ORAL at 02:15

## 2022-08-24 RX ADMIN — AMLODIPINE BESYLATE 5 MILLIGRAM(S): 2.5 TABLET ORAL at 05:40

## 2022-08-24 RX ADMIN — GABAPENTIN 100 MILLIGRAM(S): 400 CAPSULE ORAL at 13:17

## 2022-08-24 RX ADMIN — Medication 975 MILLIGRAM(S): at 21:45

## 2022-08-24 NOTE — PROGRESS NOTE ADULT - PROBLEM SELECTOR PLAN 2
On 27 units long-acting insulin once and 7 units short acting insulin with meals at home, per patient. Per pharmacy, pt rx Tresiba 18 units, Humalog 6-8 units with meals, and Ozempic. On Metformin 500 mg BID. 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 On gabapentin 100 mg TID. HgA1c 7.7%. POC BG 63 in AM on Lantus 22, pre-meal 5.   - decrease to 17 units long-acting, 5 units pre-meal bolus along with ISS and reassess based on how much ISS is needed  - continue gabapentin 100 mg TID  [] decrease Lantus by 1/2 the night before surgery. No pre-meal that morning since will be NPO. On 27 units long-acting insulin once and 7 units short acting insulin with meals at home, per patient. Per pharmacy, pt rx Tresiba 18 units, Humalog 6-8 units with meals, and Ozempic. On Metformin 500 mg BID. 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 On gabapentin 100 mg TID. HgA1c 7.7%. POC BG 63 in AM on Lantus 22, pre-meal 7.   - 17 units long-acting, 5 units pre-meal bolus along with ISS and reassess based on how much ISS is needed  - continue gabapentin 100 mg TID  [] decrease Lantus by 1/2 the night before surgery. No pre-meal that morning since will be NPO. Per pt, on 27 units long-acting insulin and 7 units short acting. Per outpt pharmacy, pt rx Tresiba 18 units, Humalog 6-8 units with meals, and Ozempic. On Metformin 500 mg BID. 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 On gabapentin 100 mg TID. HgA1c 7.7%. Hypoglycemic to 63 in AM after Lantus 22, pre-meal 7.   - cont. 17 units long-acting, 5 units pre-meal bolus along with ISS and reassess based on how much ISS is needed  - continue gabapentin 100 mg TID  [] decrease Lantus by 1/2 tonight before surgery. No pre-meal in the morning since will be NPO.

## 2022-08-24 NOTE — PROGRESS NOTE ADULT - ASSESSMENT
58 y/o M w/ left foot wound  - Pt was seen and evaluated.   - Afebrile, WBC 10.28, CRP 86, ESR 90  - Left foot fibronecrotic wound local to the lateral fourth ray, probes to bone and tracks medially and plantarly, No pus today, malodor mildly improved   - X-Ray: Erosive changes about the lateral aspect of the left fourth metatarsal head and proximal phalanx are suspicious for osteomyelitis.   - Recommend ID consult  - Lef foot wound culture growing staph prelim  - L foot MR - Om distal half of 4th metatarsal, 4th Proximal phalanx  -Booked for partial 4th ray resection on Thursday 8/25 @ 3PM  - Medical cleareance appreciated  - Discussed with attending   56 y/o M w/ left foot wound  - Pt was seen and evaluated.   - Afebrile, WBC 7.88  - Left foot fibronecrotic wound local to the lateral fourth ray, probes to bone and tracks medially and plantarly, No pus today, malodor mildly improved   - X-Ray: Erosive changes about the lateral aspect of the left fourth metatarsal head and proximal phalanx are suspicious for osteomyelitis.   - Lef foot wound culture growing staph prelim  - L foot MR - Om distal half of 4th metatarsal, 4th Proximal phalanx  -Booked for partial 4th ray resection on Thursday 8/25 @ 3PM  -Please hold anticoagulants 12 hours before procedure  - Medical cleareance appreciated  - Discussed with attending

## 2022-08-24 NOTE — PROGRESS NOTE ADULT - PROBLEM SELECTOR PLAN 9
Diet: Diabetic diet. Pending plan from Podiatry.   DVT ppx: lovenox  Dispo: admit to medicine    Patient has asked that the team not call his wife. If she is present physically when things are being discussed, that is okay.

## 2022-08-24 NOTE — PROGRESS NOTE ADULT - ATTENDING COMMENTS
56yo M with PMH of IDDM with neuropathy, s/p L 5th toe amputation 3/2022 for ?gangrene, HTN, and CAD s/p CABG 7/2022 who presented with 4 days of pain and discharge from the prior L 5th toe amputation site admitted for OM      #Left 4th metatarsal OM - Confirmed on MRI. podiatry following; inflammatory makers elevated. Wound cx growing ESBL ecoli and MRSA. C/w vancomycin and start meropenem. Patient likely will need partial resection. Patient reports good exercise capacity since CABG, METS>4, no active chest pain and not overloaded on exam. Patient is medically optimized for planned procedure. ID eval requested for antibiotic management.   #IDDM – home regimen: Tresiba 18U qhs, Humalog 6-8U qac TID, Ozempic, metformin 500mg po BID. Currently on lantus 17 and premeal 5TID. FS acceptable. Monitor for now.   #CAD s/p CABG – resume home antiplatelet agents and meds for GDMT.     Plan discussed with patient and HS

## 2022-08-24 NOTE — PROGRESS NOTE ADULT - SUBJECTIVE AND OBJECTIVE BOX
DATE OF SERVICE: 08-24-22 @ 09:04    Patient is a 57y old  Male who presents with a chief complaint of pain/discharge from left small toe amputation site (23 Aug 2022 06:51)      SUBJECTIVE / OVERNIGHT EVENTS: Patient request medication for foot pain again overnight and received Tylenol and Tramadol. 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 He was also upset because when he asked for more medication he was given Tramadol instead of Motrin. Otherwise, no acute events. He again today states that he was only taking Norvasc 7.5, aspirin, Tylenol and insulin at home.     MEDICATIONS  (STANDING):  amLODIPine   Tablet 5 milliGRAM(s) Oral daily  aspirin  chewable 81 milliGRAM(s) Oral daily  atorvastatin 80 milliGRAM(s) Oral at bedtime  carvedilol 12.5 milliGRAM(s) Oral every 12 hours  dextrose 5%. 1000 milliLiter(s) (100 mL/Hr) IV Continuous <Continuous>  dextrose 5%. 1000 milliLiter(s) (50 mL/Hr) IV Continuous <Continuous>  dextrose 50% Injectable 25 Gram(s) IV Push once  dextrose 50% Injectable 12.5 Gram(s) IV Push once  dextrose 50% Injectable 25 Gram(s) IV Push once  ferrous    sulfate 325 milliGRAM(s) Oral daily  folic acid 1 milliGRAM(s) Oral daily  furosemide    Tablet 40 milliGRAM(s) Oral daily  gabapentin 100 milliGRAM(s) Oral three times a day  glucagon  Injectable 1 milliGRAM(s) IntraMuscular once  heparin   Injectable 5000 Unit(s) SubCutaneous every 8 hours  insulin glargine Injectable (LANTUS) 17 Unit(s) SubCutaneous at bedtime  insulin lispro (ADMELOG) corrective regimen sliding scale   SubCutaneous three times a day before meals  insulin lispro (ADMELOG) corrective regimen sliding scale   SubCutaneous at bedtime  insulin lispro Injectable (ADMELOG) 5 Unit(s) SubCutaneous three times a day before meals  levothyroxine 100 MICROGram(s) Oral daily  losartan 25 milliGRAM(s) Oral daily  meropenem  IVPB 1000 milliGRAM(s) IV Intermittent every 8 hours  senna 2 Tablet(s) Oral at bedtime  vancomycin  IVPB 1500 milliGRAM(s) IV Intermittent every 12 hours    MEDICATIONS  (PRN):  acetaminophen     Tablet .. 650 milliGRAM(s) Oral every 6 hours PRN Temp greater or equal to 38C (100.4F), Mild Pain (1 - 3)  dextrose Oral Gel 15 Gram(s) Oral once PRN Blood Glucose LESS THAN 70 milliGRAM(s)/deciliter  traMADol 50 milliGRAM(s) Oral every 6 hours PRN Severe Pain (7 - 10)      Vital Signs Last 24 Hrs  T(C): 36.3 (24 Aug 2022 05:11), Max: 36.9 (23 Aug 2022 21:34)  T(F): 97.4 (24 Aug 2022 05:11), Max: 98.5 (23 Aug 2022 21:34)  HR: 72 (24 Aug 2022 05:11) (71 - 81)  BP: 140/75 (24 Aug 2022 05:11) (128/67 - 152/79)  BP(mean): --  RR: 18 (24 Aug 2022 05:11) (18 - 18)  SpO2: 97% (24 Aug 2022 05:11) (97% - 100%)    Parameters below as of 24 Aug 2022 05:11  Patient On (Oxygen Delivery Method): room air      CAPILLARY BLOOD GLUCOSE      POCT Blood Glucose.: 128 mg/dL (24 Aug 2022 08:34)  POCT Blood Glucose.: 212 mg/dL (23 Aug 2022 21:19)  POCT Blood Glucose.: 218 mg/dL (23 Aug 2022 17:48)  POCT Blood Glucose.: 216 mg/dL (23 Aug 2022 12:33)    I&O's Summary      PHYSICAL EXAM:  GENERAL: NAD, well-developed  HEAD:  Atraumatic, Normocephalic  EYES: EOMI, PERRLA, conjunctiva and sclera clear  NECK: Supple, No JVD  CHEST/LUNG: Clear to auscultation bilaterally; No wheeze  HEART: Regular rate and rhythm; No murmurs, rubs, or gallops  ABDOMEN: Soft, Nontender, Nondistended; Bowel sounds present  EXTREMITIES:  2+ Peripheral Pulses, No clubbing, cyanosis, or edema  PSYCH: AAOx3  NEUROLOGY: non-focal  SKIN: No rashes or lesions    LABS:                        10.2   7.88  )-----------( 410      ( 24 Aug 2022 06:35 )             32.1     08-24    141  |  104  |  10  ----------------------------<  87  3.5   |  27  |  0.74    Ca    9.2      24 Aug 2022 06:35  Phos  3.5     08-24  Mg     1.60     08-24                RADIOLOGY & ADDITIONAL TESTS:    Imaging Personally Reviewed:    Consultant(s) Notes Reviewed:      Care Discussed with Consultants/Other Providers:   DATE OF SERVICE: 08-24-22 @ 09:04    Patient is a 57y old  Male who presents with a chief complaint of pain/discharge from left small toe amputation site (23 Aug 2022 06:51)      SUBJECTIVE / OVERNIGHT EVENTS: Patient request medication for foot pain again overnight and received Tylenol and Tramadol. 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 He was also upset because when he asked for more medication he was given Tramadol instead of Motrin. Otherwise, no acute events. He again today states that he was only taking Norvasc 7.5, aspirin, Tylenol and insulin at home.   Patient was seen with Podiatry present and planned amputation was discussed. The patient states that he never fully understood that he needed an amputation. He plans to talk to his wife and Podiatry will return around 1 pm to discuss his final decision. On further discussion, patient is overwhelmed. He feels that he did everything he could to prevent complications after the last toe amputation and he still got an infection so he is not feeling hopeful that he will be able to prevent this from happening again. The nervous and vascular effects of diabetes on the feet were discussed. He is also concerned about being fired from his job and not being able to support his family.     MEDICATIONS  (STANDING):  amLODIPine   Tablet 5 milliGRAM(s) Oral daily  aspirin  chewable 81 milliGRAM(s) Oral daily  atorvastatin 80 milliGRAM(s) Oral at bedtime  carvedilol 12.5 milliGRAM(s) Oral every 12 hours  dextrose 5%. 1000 milliLiter(s) (100 mL/Hr) IV Continuous <Continuous>  dextrose 5%. 1000 milliLiter(s) (50 mL/Hr) IV Continuous <Continuous>  dextrose 50% Injectable 25 Gram(s) IV Push once  dextrose 50% Injectable 12.5 Gram(s) IV Push once  dextrose 50% Injectable 25 Gram(s) IV Push once  ferrous    sulfate 325 milliGRAM(s) Oral daily  folic acid 1 milliGRAM(s) Oral daily  furosemide    Tablet 40 milliGRAM(s) Oral daily  gabapentin 100 milliGRAM(s) Oral three times a day  glucagon  Injectable 1 milliGRAM(s) IntraMuscular once  heparin   Injectable 5000 Unit(s) SubCutaneous every 8 hours  insulin glargine Injectable (LANTUS) 17 Unit(s) SubCutaneous at bedtime  insulin lispro (ADMELOG) corrective regimen sliding scale   SubCutaneous three times a day before meals  insulin lispro (ADMELOG) corrective regimen sliding scale   SubCutaneous at bedtime  insulin lispro Injectable (ADMELOG) 5 Unit(s) SubCutaneous three times a day before meals  levothyroxine 100 MICROGram(s) Oral daily  losartan 25 milliGRAM(s) Oral daily  meropenem  IVPB 1000 milliGRAM(s) IV Intermittent every 8 hours  senna 2 Tablet(s) Oral at bedtime  vancomycin  IVPB 1500 milliGRAM(s) IV Intermittent every 12 hours    MEDICATIONS  (PRN):  acetaminophen     Tablet .. 650 milliGRAM(s) Oral every 6 hours PRN Temp greater or equal to 38C (100.4F), Mild Pain (1 - 3)  dextrose Oral Gel 15 Gram(s) Oral once PRN Blood Glucose LESS THAN 70 milliGRAM(s)/deciliter  traMADol 50 milliGRAM(s) Oral every 6 hours PRN Severe Pain (7 - 10)      Vital Signs Last 24 Hrs  T(C): 36.3 (24 Aug 2022 05:11), Max: 36.9 (23 Aug 2022 21:34)  T(F): 97.4 (24 Aug 2022 05:11), Max: 98.5 (23 Aug 2022 21:34)  HR: 72 (24 Aug 2022 05:11) (71 - 81)  BP: 140/75 (24 Aug 2022 05:11) (128/67 - 152/79)  BP(mean): --  RR: 18 (24 Aug 2022 05:11) (18 - 18)  SpO2: 97% (24 Aug 2022 05:11) (97% - 100%)    Parameters below as of 24 Aug 2022 05:11  Patient On (Oxygen Delivery Method): room air      CAPILLARY BLOOD GLUCOSE      POCT Blood Glucose.: 128 mg/dL (24 Aug 2022 08:34)  POCT Blood Glucose.: 212 mg/dL (23 Aug 2022 21:19)  POCT Blood Glucose.: 218 mg/dL (23 Aug 2022 17:48)  POCT Blood Glucose.: 216 mg/dL (23 Aug 2022 12:33)    I&O's Summary      PHYSICAL EXAM:  GENERAL: NAD, well-developed  HEAD:  Atraumatic, Normocephalic  EYES: EOMI, PERRLA, conjunctiva and sclera clear  NECK: Supple, No JVD  CHEST/LUNG: Clear to auscultation bilaterally; No wheeze  HEART: Regular rate and rhythm; No murmurs, rubs, or gallops  ABDOMEN: Soft, Nontender, Nondistended; Bowel sounds present  EXTREMITIES:  2+ R PT, Trace R DP, trace to 1+ R PT and DP (exam limited on R 2/2 swelling), No clubbing, cyanosis. 2+ edema R foot/ankle. Open wound present at left 5th toe amputation site with probe to bone.   PSYCH: AAOx3  NEUROLOGY: non-focal. Absent proprioception of R 1st toe, intact L. Decreased sensation to light touch up to mid foot on R and up to above the ankle on L.   SKIN: No rashes or lesions      LABS:                        10.2   7.88  )-----------( 410      ( 24 Aug 2022 06:35 )             32.1     08-24    141  |  104  |  10  ----------------------------<  87  3.5   |  27  |  0.74    Ca    9.2      24 Aug 2022 06:35  Phos  3.5     08-24  Mg     1.60     08-24                RADIOLOGY & ADDITIONAL TESTS:    Imaging Personally Reviewed:    Consultant(s) Notes Reviewed:      Care Discussed with Consultants/Other Providers:   DATE OF SERVICE: 08-24-22 @ 09:04    Patient is a 57y old  Male who presents with a chief complaint of pain/discharge from left small toe amputation site (23 Aug 2022 06:51)      SUBJECTIVE / OVERNIGHT EVENTS: Patient request medication for foot pain again overnight and received Tylenol and Tramadol. 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 He was also upset because when he asked for more medication he was given Tramadol instead of Motrin. Otherwise, no acute events.   Patient was seen with Podiatry present and planned amputation was discussed. The patient states that he never fully understood that he needed an amputation. After some thought, patient decided he is okay to move ahead with the procedure even though he was not able to get in touch with his wife. Podiatry will return around 1 pm to discuss his final decision.   On further discussion, patient is overwhelmed. He feels that he did everything he could to prevent complications after the last toe amputation and he still got an infection so he is not feeling hopeful that he will be able to prevent this from happening again. The nervous and vascular effects of diabetes on the feet were discussed. He is also concerned about being fired from his job and not being able to support his family.     MEDICATIONS  (STANDING):  amLODIPine   Tablet 5 milliGRAM(s) Oral daily  aspirin  chewable 81 milliGRAM(s) Oral daily  atorvastatin 80 milliGRAM(s) Oral at bedtime  carvedilol 12.5 milliGRAM(s) Oral every 12 hours  dextrose 5%. 1000 milliLiter(s) (100 mL/Hr) IV Continuous <Continuous>  dextrose 5%. 1000 milliLiter(s) (50 mL/Hr) IV Continuous <Continuous>  dextrose 50% Injectable 25 Gram(s) IV Push once  dextrose 50% Injectable 12.5 Gram(s) IV Push once  dextrose 50% Injectable 25 Gram(s) IV Push once  ferrous    sulfate 325 milliGRAM(s) Oral daily  folic acid 1 milliGRAM(s) Oral daily  furosemide    Tablet 40 milliGRAM(s) Oral daily  gabapentin 100 milliGRAM(s) Oral three times a day  glucagon  Injectable 1 milliGRAM(s) IntraMuscular once  heparin   Injectable 5000 Unit(s) SubCutaneous every 8 hours  insulin glargine Injectable (LANTUS) 17 Unit(s) SubCutaneous at bedtime  insulin lispro (ADMELOG) corrective regimen sliding scale   SubCutaneous three times a day before meals  insulin lispro (ADMELOG) corrective regimen sliding scale   SubCutaneous at bedtime  insulin lispro Injectable (ADMELOG) 5 Unit(s) SubCutaneous three times a day before meals  levothyroxine 100 MICROGram(s) Oral daily  losartan 25 milliGRAM(s) Oral daily  meropenem  IVPB 1000 milliGRAM(s) IV Intermittent every 8 hours  senna 2 Tablet(s) Oral at bedtime  vancomycin  IVPB 1500 milliGRAM(s) IV Intermittent every 12 hours    MEDICATIONS  (PRN):  acetaminophen     Tablet .. 650 milliGRAM(s) Oral every 6 hours PRN Temp greater or equal to 38C (100.4F), Mild Pain (1 - 3)  dextrose Oral Gel 15 Gram(s) Oral once PRN Blood Glucose LESS THAN 70 milliGRAM(s)/deciliter  traMADol 50 milliGRAM(s) Oral every 6 hours PRN Severe Pain (7 - 10)      Vital Signs Last 24 Hrs  T(C): 36.3 (24 Aug 2022 05:11), Max: 36.9 (23 Aug 2022 21:34)  T(F): 97.4 (24 Aug 2022 05:11), Max: 98.5 (23 Aug 2022 21:34)  HR: 72 (24 Aug 2022 05:11) (71 - 81)  BP: 140/75 (24 Aug 2022 05:11) (128/67 - 152/79)  BP(mean): --  RR: 18 (24 Aug 2022 05:11) (18 - 18)  SpO2: 97% (24 Aug 2022 05:11) (97% - 100%)    Parameters below as of 24 Aug 2022 05:11  Patient On (Oxygen Delivery Method): room air      CAPILLARY BLOOD GLUCOSE      POCT Blood Glucose.: 128 mg/dL (24 Aug 2022 08:34)  POCT Blood Glucose.: 212 mg/dL (23 Aug 2022 21:19)  POCT Blood Glucose.: 218 mg/dL (23 Aug 2022 17:48)  POCT Blood Glucose.: 216 mg/dL (23 Aug 2022 12:33)    I&O's Summary      PHYSICAL EXAM:  GENERAL: NAD, well-developed  HEAD:  Atraumatic, Normocephalic  EYES: EOMI, PERRLA, conjunctiva and sclera clear  NECK: Supple, No JVD  CHEST/LUNG: Clear to auscultation bilaterally; No wheeze  HEART: Regular rate and rhythm; No murmurs, rubs, or gallops  ABDOMEN: Soft, Nontender, Nondistended; Bowel sounds present  EXTREMITIES:  2+ R PT, 1+ AT, Trace R DP, trace to 1+ R PT, AT and trace DP (exam limited on R 2/2 swelling), No clubbing, cyanosis. 2+ edema R foot/ankle. Open wound present at left 5th toe amputation site with probe to bone.   PSYCH: AAOx3  NEUROLOGY: non-focal. Absent proprioception of R 1st toe, intact L. Decreased sensation to light touch up to mid foot on R and up to above the ankle on L.   SKIN: No rashes or lesions      LABS:                        10.2   7.88  )-----------( 410      ( 24 Aug 2022 06:35 )             32.1     08-24    141  |  104  |  10  ----------------------------<  87  3.5   |  27  |  0.74    Ca    9.2      24 Aug 2022 06:35  Phos  3.5     08-24  Mg     1.60     08-24                RADIOLOGY & ADDITIONAL TESTS:    Imaging Personally Reviewed:    Consultant(s) Notes Reviewed:      Care Discussed with Consultants/Other Providers:

## 2022-08-24 NOTE — CONSULT NOTE ADULT - SUBJECTIVE AND OBJECTIVE BOX
Patient is a 57y old  Male who presents with a chief complaint of pain/discharge from left small toe amputation site (24 Aug 2022 09:03)    HPI:  57F with IDDM c/b diabetic neuropathy, s/p L 5th toe amputation 3/2022 for ?gangrene, HTN, and CAD s/p CABG 7/2022 who presented with 4 days of pain and discharge from his prior L 5th toe amputation site concerning for osteomyelitis. ID consulted for OM.     Podiatry following, planned for partial 4th toe resection.     CRP 86, ESR 80. WBC 10.28  Wound culture final: MRSA and ESBL E. coli  MRI L foot: involvement of 4th MTP, 4th metatarsal head, base of 4th proximal phalanx. No stump osteomyelitis. Reactive osteitis of 3rd metatarsal head.         s/p Flagyl (8/21), Cefepime (8/21-8/22), Clinda (8/21)    Currently on meropenem  IVPB 1000 every 8 hours (8/23 --- ) and vancomycin  IVPB 1500 every 12 hours (8/21 --- )    PAST MEDICAL & SURGICAL HISTORY:  Hypertension  Diabetes  IDDM, diabetic neuropathy  CAD (coronary artery disease)  s/p CABG July 2022  S/P amputation of lesser toe, left  March 2022  S/P CABG (coronary artery bypass graft)  July 2022      Allergies  No Known Allergies    ANTIMICROBIALS (past 90 days)  MEDICATIONS  (STANDING):  s/p Flagyl (8/21), Cefepime (8/21-8/22), Clinda (8/21)    meropenem  IVPB 1000 every 8 hours (8/23 --- )  vancomycin  IVPB 1500 every 12 hours (8/21 --- )    MEDICATIONS  (STANDING):  acetaminophen     Tablet .. 975 every 8 hours PRN  amLODIPine   Tablet 5 daily  aspirin  chewable 81 daily  atorvastatin 80 at bedtime  carvedilol 12.5 every 12 hours  dextrose 50% Injectable 25 once  dextrose 50% Injectable 12.5 once  dextrose 50% Injectable 25 once  dextrose Oral Gel 15 once PRN  furosemide    Tablet 40 daily  gabapentin 100 three times a day  glucagon  Injectable 1 once  heparin   Injectable 5000 every 8 hours  insulin glargine Injectable (LANTUS) 17 at bedtime  insulin lispro (ADMELOG) corrective regimen sliding scale  three times a day before meals  insulin lispro (ADMELOG) corrective regimen sliding scale  at bedtime  insulin lispro Injectable (ADMELOG) 5 three times a day before meals  levothyroxine 100 daily  losartan 25 daily  senna 2 at bedtime  traMADol 50 every 6 hours PRN    SOCIAL HISTORY:       FAMILY HISTORY:  No pertinent family history in first degree relatives      REVIEW OF SYSTEMS  [  ] ROS unobtainable because:    [  ] All other systems negative except as noted below:	    Constitutional:  [ ] fever [ ] chills  [ ] weight loss  [ ] weakness  Skin:  [ ] rash [ ] phlebitis	  Eyes: [ ] icterus [ ] pain  [ ] discharge	  ENMT: [ ] sore throat  [ ] thrush [ ] ulcers [ ] exudates  Respiratory: [ ] dyspnea [ ] hemoptysis [ ] cough [ ] sputum	  Cardiovascular:  [ ] chest pain [ ] palpitations [ ] edema	  Gastrointestinal:  [ ] nausea [ ] vomiting [ ] diarrhea [ ] constipation [ ] pain	  Genitourinary:  [ ] dysuria [ ] frequency [ ] hematuria [ ] discharge [ ] flank pain  [ ] incontinence  Musculoskeletal:  [ ] myalgias [ ] arthralgias [ ] arthritis  [ ] back pain  Neurological:  [ ] headache [ ] seizures  [ ] confusion/altered mental status  Psychiatric:  [ ] anxiety [ ] depression	  Hematology/Lymphatics:  [ ] lymphadenopathy  Endocrine:  [ ] adrenal [ ] thyroid  Allergic/Immunologic:	 [ ] transplant [ ] seasonal    Vital Signs Last 24 Hrs  T(F): 97.4 (08-24-22 @ 05:11), Max: 98.5 (08-22-22 @ 21:58)  Vital Signs Last 24 Hrs  HR: 72 (08-24-22 @ 05:11) (71 - 81)  BP: 140/75 (08-24-22 @ 05:11) (128/67 - 152/79)  RR: 18 (08-24-22 @ 05:11)  SpO2: 97% (08-24-22 @ 05:11) (97% - 100%)  Wt(kg): --    PHYSICAL EXAM:  Constitutional: non-toxic, no distress  HEAD/EYES: anicteric, no conjunctival injection  ENT:  supple, no thrush  Cardiovascular:   normal S1, S2, no murmur, no edema  Respiratory:  clear BS bilaterally, no wheezes, no rales  GI:  soft, non-tender, normal bowel sounds  :  no ellison, no CVA tenderness  Musculoskeletal:  no synovitis, normal ROM  Neurologic: awake and alert, normal strength, no focal findings  Skin:  no rash, no erythema, no phlebitis  Heme/Onc: no lymphadenopathy   Psychiatric:  awake, alert, appropriate mood                            10.2   7.88  )-----------( 410      ( 24 Aug 2022 06:35 )             32.1   08-24    141  |  104  |  10  ----------------------------<  87  3.5   |  27  |  0.74    Ca    9.2      24 Aug 2022 06:35  Phos  3.5     08-24  Mg     1.60     08-24      MICROBIOLOGY:Vancomycin Level, Trough: 12.3 (08-23 @ 09:32)    Culture - Abscess with Gram Stain (collected 21 Aug 2022 10:25)  Source: .Abscess foot  Preliminary Report (23 Aug 2022 12:57):    Moderate Methicillin Resistant Staphylococcus aureus    Few Escherichia coli ESBL    Few Alpha hemolytic strep "Susceptibilities not performed"    Moderate Actinomyces turicensis "Susceptibilities not performed"  Organism: Methicillin resistant Staphylococcus aureus  Escherichia coli ESBL (23 Aug 2022 12:57)  Organism: Methicillin resistant Staphylococcus aureus (23 Aug 2022 12:57)      -  Ampicillin/Sulbactam: R <=8/4      -  Cefazolin: R <=4      -  Clindamycin: S <=0.25      -  Daptomycin: S 0.5      -  Erythromycin: S <=0.25      -  Gentamicin: S <=1 Should not be used as monotherapy      -  Linezolid: S 4      -  Oxacillin: R >2      -  Penicillin: R >8      -  Rifampin: S <=1 Should not be used as monotherapy      -  Tetra/Doxy: S <=1      -  Trimethoprim/Sulfamethoxazole: S <=0.5/9.5      -  Vancomycin: S 2      Method Type: [facility]ERSON  Organism: Escherichia coli ESBL (23 Aug 2022 10:55)      -  Amikacin: S <=16      -  Amoxicillin/Clavulanic Acid: S <=8/4      -  Ampicillin: R >16 These ampicillin results predict results for amoxicillin      -  Ampicillin/Sulbactam: R 16/8 Enterobacter, Klebsiella aerogenes, Citrobacter, and Serratia may develop resistance during prolonged therapy (3-4 days)      -  Aztreonam: R 16      -  Cefazolin: R >16 Enterobacter, Klebsiella aerogenes, Citrobacter, and Serratia may develop resistance during prolonged therapy (3-4 days)      -  Cefepime: R 16      -  Ceftriaxone: R >32 Enterobacter, Klebsiella aerogenes, Citrobacter, and Serratia may develop resistance during prolonged therapy      -  Ciprofloxacin: R >2      -  Ertapenem: S <=0.5      -  Gentamicin: S <=2      -  Levofloxacin: R >4      -  Meropenem: S <=1      -  Piperacillin/Tazobactam: R <=8      -  Tobramycin: S <=2      -  Trimethoprim/Sulfamethoxazole: S <=0.5/9.5      Method Type: [facility]        RADIOLOGY:  imaging below personally reviewed and agree with findings  < from: MR Foot w/wo IV Cont, Left (08.22.22 @ 21:22) >  IMPRESSION:  1.  Septic arthritis/osteomyelitis centered at the fourth MTP, joint   owing to a lateral deep ulcer with soft tissue phlegmon extending to the   joint, with draining abscess to the skin at that level. Partial erosion   of the fourth metatarsal head is present as well as the base of the   fourth digit proximal phalanx. Osteomyelitis involves the entire distal   half of the fourth metatarsal and the fourth digit proximal phalanx.    2.  Reactive osteitis within the adjacent third metatarsal head.    3.  Status post transmetatarsal amputation of the fifth ray, without   stump osteomyelitis.    < end of copied text >  < from: Xray Foot AP + Lateral + Oblique, Left (08.21.22 @ 06:17) >    IMPRESSION:    Erosive changes about the lateral aspect of the left fourth metatarsal   head and proximal phalanx are suspicious for osteomyelitis. MRI can be   obtained for further characterization.    < end of copied text >   Patient is a 57y old  Male who presents with a chief complaint of pain/discharge from left small toe amputation site (24 Aug 2022 09:03)    HPI:  57F with IDDM c/b diabetic neuropathy, s/p L 5th toe amputation 3/2022 for ?gangrene, HTN, and CAD s/p CABG 7/2022 who presented with 4 days of pain and discharge from his prior L 5th toe amputation site concerning for osteomyelitis. ID consulted for OM.     Patient has been afebrile VSS  WBC stable 7, BMP wnl Cr 0.7  ESR 90, CRP 86  X-Ray: Erosive changes about the lateral aspect of the left fourth metatarsal head and proximal phalanx are suspicious for osteomyelitis.   MRI L foot: involvement of 4th MTP, 4th metatarsal head, base of 4th proximal phalanx. No stump osteomyelitis. Reactive osteitis of 3rd metatarsal head.   Abscess Cx: MRSA and ESBL E. coli    s/p Flagyl (8/21), Cefepime (8/21-8/22), Clinda (8/21)  Currently on meropenem  IVPB 1000 every 8 hours (8/23 --- ) and vancomycin  IVPB 1500 every 12 hours (8/21 --- )  Podiatry following, planned for partial 4th toe resection.     PAST MEDICAL & SURGICAL HISTORY:  Hypertension  Diabetes  IDDM, diabetic neuropathy  CAD (coronary artery disease)  s/p CABG July 2022  S/P amputation of lesser toe, left  March 2022  S/P CABG (coronary artery bypass graft)  July 2022      Allergies  No Known Allergies    ANTIMICROBIALS (past 90 days)  MEDICATIONS  (STANDING):  s/p Flagyl (8/21), Cefepime (8/21-8/22), Clinda (8/21)    meropenem  IVPB 1000 every 8 hours (8/23 --- )  vancomycin  IVPB 1500 every 12 hours (8/21 --- )    MEDICATIONS  (STANDING):  acetaminophen     Tablet .. 975 every 8 hours PRN  amLODIPine   Tablet 5 daily  aspirin  chewable 81 daily  atorvastatin 80 at bedtime  carvedilol 12.5 every 12 hours  dextrose 50% Injectable 25 once  dextrose 50% Injectable 12.5 once  dextrose 50% Injectable 25 once  dextrose Oral Gel 15 once PRN  furosemide    Tablet 40 daily  gabapentin 100 three times a day  glucagon  Injectable 1 once  heparin   Injectable 5000 every 8 hours  insulin glargine Injectable (LANTUS) 17 at bedtime  insulin lispro (ADMELOG) corrective regimen sliding scale  three times a day before meals  insulin lispro (ADMELOG) corrective regimen sliding scale  at bedtime  insulin lispro Injectable (ADMELOG) 5 three times a day before meals  levothyroxine 100 daily  losartan 25 daily  senna 2 at bedtime  traMADol 50 every 6 hours PRN    SOCIAL HISTORY:       FAMILY HISTORY:  No pertinent family history in first degree relatives      REVIEW OF SYSTEMS  [  ] ROS unobtainable because:    [  ] All other systems negative except as noted below:	    Constitutional:  [ ] fever [ ] chills  [ ] weight loss  [ ] weakness  Skin:  [ ] rash [ ] phlebitis	  Eyes: [ ] icterus [ ] pain  [ ] discharge	  ENMT: [ ] sore throat  [ ] thrush [ ] ulcers [ ] exudates  Respiratory: [ ] dyspnea [ ] hemoptysis [ ] cough [ ] sputum	  Cardiovascular:  [ ] chest pain [ ] palpitations [ ] edema	  Gastrointestinal:  [ ] nausea [ ] vomiting [ ] diarrhea [ ] constipation [ ] pain	  Genitourinary:  [ ] dysuria [ ] frequency [ ] hematuria [ ] discharge [ ] flank pain  [ ] incontinence  Musculoskeletal:  [ ] myalgias [ ] arthralgias [ ] arthritis  [ ] back pain  Neurological:  [ ] headache [ ] seizures  [ ] confusion/altered mental status  Psychiatric:  [ ] anxiety [ ] depression	  Hematology/Lymphatics:  [ ] lymphadenopathy  Endocrine:  [ ] adrenal [ ] thyroid  Allergic/Immunologic:	 [ ] transplant [ ] seasonal    Vital Signs Last 24 Hrs  T(F): 97.4 (08-24-22 @ 05:11), Max: 98.5 (08-22-22 @ 21:58)  Vital Signs Last 24 Hrs  HR: 72 (08-24-22 @ 05:11) (71 - 81)  BP: 140/75 (08-24-22 @ 05:11) (128/67 - 152/79)  RR: 18 (08-24-22 @ 05:11)  SpO2: 97% (08-24-22 @ 05:11) (97% - 100%)  Wt(kg): --    PHYSICAL EXAM:  Constitutional: non-toxic, no distress  HEAD/EYES: anicteric, no conjunctival injection  ENT:  supple, no thrush  Cardiovascular:   normal S1, S2, no murmur, no edema  Respiratory:  clear BS bilaterally, no wheezes, no rales  GI:  soft, non-tender, normal bowel sounds  :  no ellison, no CVA tenderness  Musculoskeletal:  no synovitis, normal ROM  Neurologic: awake and alert, normal strength, no focal findings  Skin:  no rash, no erythema, no phlebitis  Heme/Onc: no lymphadenopathy   Psychiatric:  awake, alert, appropriate mood                            10.2   7.88  )-----------( 410      ( 24 Aug 2022 06:35 )             32.1   08-24    141  |  104  |  10  ----------------------------<  87  3.5   |  27  |  0.74    Ca    9.2      24 Aug 2022 06:35  Phos  3.5     08-24  Mg     1.60     08-24      MICROBIOLOGY:Vancomycin Level, Trough: 12.3 (08-23 @ 09:32)    Culture - Abscess with Gram Stain (collected 21 Aug 2022 10:25)  Source: .Abscess foot  Preliminary Report (23 Aug 2022 12:57):    Moderate Methicillin Resistant Staphylococcus aureus    Few Escherichia coli ESBL    Few Alpha hemolytic strep "Susceptibilities not performed"    Moderate Actinomyces turicensis "Susceptibilities not performed"  Organism: Methicillin resistant Staphylococcus aureus  Escherichia coli ESBL (23 Aug 2022 12:57)  Organism: Methicillin resistant Staphylococcus aureus (23 Aug 2022 12:57)      -  Ampicillin/Sulbactam: R <=8/4      -  Cefazolin: R <=4      -  Clindamycin: S <=0.25      -  Daptomycin: S 0.5      -  Erythromycin: S <=0.25      -  Gentamicin: S <=1 Should not be used as monotherapy      -  Linezolid: S 4      -  Oxacillin: R >2      -  Penicillin: R >8      -  Rifampin: S <=1 Should not be used as monotherapy      -  Tetra/Doxy: S <=1      -  Trimethoprim/Sulfamethoxazole: S <=0.5/9.5      -  Vancomycin: S 2      Method Type: [facility]ERSON  Organism: Escherichia coli ESBL (23 Aug 2022 10:55)      -  Amikacin: S <=16      -  Amoxicillin/Clavulanic Acid: S <=8/4      -  Ampicillin: R >16 These ampicillin results predict results for amoxicillin      -  Ampicillin/Sulbactam: R 16/8 Enterobacter, Klebsiella aerogenes, Citrobacter, and Serratia may develop resistance during prolonged therapy (3-4 days)      -  Aztreonam: R 16      -  Cefazolin: R >16 Enterobacter, Klebsiella aerogenes, Citrobacter, and Serratia may develop resistance during prolonged therapy (3-4 days)      -  Cefepime: R 16      -  Ceftriaxone: R >32 Enterobacter, Klebsiella aerogenes, Citrobacter, and Serratia may develop resistance during prolonged therapy      -  Ciprofloxacin: R >2      -  Ertapenem: S <=0.5      -  Gentamicin: S <=2      -  Levofloxacin: R >4      -  Meropenem: S <=1      -  Piperacillin/Tazobactam: R <=8      -  Tobramycin: S <=2      -  Trimethoprim/Sulfamethoxazole: S <=0.5/9.5      Method Type: PETERSON        RADIOLOGY:  imaging below personally reviewed and agree with findings  < from: MR Foot w/wo IV Cont, Left (08.22.22 @ 21:22) >  IMPRESSION:  1.  Septic arthritis/osteomyelitis centered at the fourth MTP, joint   owing to a lateral deep ulcer with soft tissue phlegmon extending to the   joint, with draining abscess to the skin at that level. Partial erosion   of the fourth metatarsal head is present as well as the base of the   fourth digit proximal phalanx. Osteomyelitis involves the entire distal   half of the fourth metatarsal and the fourth digit proximal phalanx.    2.  Reactive osteitis within the adjacent third metatarsal head.    3.  Status post transmetatarsal amputation of the fifth ray, without   stump osteomyelitis.    < end of copied text >  < from: Xray Foot AP + Lateral + Oblique, Left (08.21.22 @ 06:17) >    IMPRESSION:    Erosive changes about the lateral aspect of the left fourth metatarsal   head and proximal phalanx are suspicious for osteomyelitis. MRI can be   obtained for further characterization.    < end of copied text >   Patient is a 57y old  Male who presents with a chief complaint of pain/discharge from left small toe amputation site (24 Aug 2022 09:03)    HPI:  57F with IDDM c/b diabetic neuropathy, s/p L 5th toe amputation 3/2022 for ?gangrene, HTN, and CAD s/p CABG 7/2022 who presented with 4 days of pain and discharge from his prior L 5th toe amputation site concerning for osteomyelitis. ID consulted for OM.     Patient has been afebrile VSS  WBC stable 7, BMP wnl Cr 0.7  ESR 90, CRP 86  X-Ray: Erosive changes about the lateral aspect of the left fourth metatarsal head and proximal phalanx are suspicious for osteomyelitis.   MRI L foot: involvement of 4th MTP, 4th metatarsal head, base of 4th proximal phalanx. No stump osteomyelitis. Reactive osteitis of 3rd metatarsal head.   Abscess Cx: MRSA and ESBL E. coli    s/p Flagyl (8/21), Cefepime (8/21-8/22), Clinda (8/21)  Currently on meropenem  IVPB 1000 every 8 hours (8/23 --- ) and vancomycin  IVPB 1500 every 12 hours (8/21 --- )  Podiatry following, planned for partial 4th toe resection.     PAST MEDICAL & SURGICAL HISTORY:  Hypertension  Diabetes  IDDM, diabetic neuropathy  CAD (coronary artery disease)  s/p CABG July 2022  S/P amputation of lesser toe, left  March 2022  S/P CABG (coronary artery bypass graft)  July 2022      Allergies  No Known Allergies    ANTIMICROBIALS (past 90 days)  MEDICATIONS  (STANDING):  s/p Flagyl (8/21), Cefepime (8/21-8/22), Clinda (8/21)    meropenem  IVPB 1000 every 8 hours (8/23 --- )  vancomycin  IVPB 1500 every 12 hours (8/21 --- )    MEDICATIONS  (STANDING):  acetaminophen     Tablet .. 975 every 8 hours PRN  amLODIPine   Tablet 5 daily  aspirin  chewable 81 daily  atorvastatin 80 at bedtime  carvedilol 12.5 every 12 hours  dextrose 50% Injectable 25 once  dextrose 50% Injectable 12.5 once  dextrose 50% Injectable 25 once  dextrose Oral Gel 15 once PRN  furosemide    Tablet 40 daily  gabapentin 100 three times a day  glucagon  Injectable 1 once  heparin   Injectable 5000 every 8 hours  insulin glargine Injectable (LANTUS) 17 at bedtime  insulin lispro (ADMELOG) corrective regimen sliding scale  three times a day before meals  insulin lispro (ADMELOG) corrective regimen sliding scale  at bedtime  insulin lispro Injectable (ADMELOG) 5 three times a day before meals  levothyroxine 100 daily  losartan 25 daily  senna 2 at bedtime  traMADol 50 every 6 hours PRN    SOCIAL HISTORY:   Denies alcohol, tobacco, recreational drug use  Lives with family, no recent travel    FAMILY HISTORY:  No pertinent family history in first degree relatives      REVIEW OF SYSTEMS  [ x ] All other systems negative except as noted below:	    Constitutional:  [ ] fever [ ] chills  [ ] weight loss  [ ] weakness  Skin:  [ ] rash [ ] phlebitis	  Eyes: [ ] icterus [ ] pain  [ ] discharge	  ENMT: [ ] sore throat  [ ] thrush [ ] ulcers [ ] exudates  Respiratory: [ ] dyspnea [ ] hemoptysis [ ] cough [ ] sputum	  Cardiovascular:  [ ] chest pain [ ] palpitations [ ] edema	  Gastrointestinal:  [ ] nausea [ ] vomiting [ ] diarrhea [ ] constipation [ ] pain	  Genitourinary:  [ ] dysuria [ ] frequency [ ] hematuria [ ] discharge [ ] flank pain  [ ] incontinence  Musculoskeletal:  [ ] myalgias [ ] arthralgias [ ] arthritis  [ ] back pain  Neurological:  [ ] headache [ ] seizures  [ ] confusion/altered mental status  Psychiatric:  [ ] anxiety [ ] depression	  Hematology/Lymphatics:  [ ] lymphadenopathy  Endocrine:  [ ] adrenal [ ] thyroid  Allergic/Immunologic:	 [ ] transplant [ ] seasonal    Vital Signs Last 24 Hrs  T(F): 97.4 (08-24-22 @ 05:11), Max: 98.5 (08-22-22 @ 21:58)  Vital Signs Last 24 Hrs  HR: 72 (08-24-22 @ 05:11) (71 - 81)  BP: 140/75 (08-24-22 @ 05:11) (128/67 - 152/79)  RR: 18 (08-24-22 @ 05:11)  SpO2: 97% (08-24-22 @ 05:11) (97% - 100%)  Wt(kg): --    Physical Exam:  Constitutional:  well preserved, comfortable  Head/Eyes: no icterus, PERRL, EOMI  ENT:  supple; no thrush  LUNGS:  CTA  CVS:  normal S1, S2, no murmur  Abd:  soft, non-tender; non-distended  Ext:  no edema +L foot bandage, no obvious drainage, 5th L toe amputation c/d/i  Vascular:  IV site no erythema tenderness or discharge  MSK:  joints without swelling  Neuro: AAO X 3, non- focal                            10.2   7.88  )-----------( 410      ( 24 Aug 2022 06:35 )             32.1   08-24    141  |  104  |  10  ----------------------------<  87  3.5   |  27  |  0.74    Ca    9.2      24 Aug 2022 06:35  Phos  3.5     08-24  Mg     1.60     08-24      MICROBIOLOGY:Vancomycin Level, Trough: 12.3 (08-23 @ 09:32)    Culture - Abscess with Gram Stain (collected 21 Aug 2022 10:25)  Source: .Abscess foot  Preliminary Report (23 Aug 2022 12:57):    Moderate Methicillin Resistant Staphylococcus aureus    Few Escherichia coli ESBL    Few Alpha hemolytic strep "Susceptibilities not performed"    Moderate Actinomyces turicensis "Susceptibilities not performed"  Organism: Methicillin resistant Staphylococcus aureus  Escherichia coli ESBL (23 Aug 2022 12:57)  Organism: Methicillin resistant Staphylococcus aureus (23 Aug 2022 12:57)      -  Ampicillin/Sulbactam: R <=8/4      -  Cefazolin: R <=4      -  Clindamycin: S <=0.25      -  Daptomycin: S 0.5      -  Erythromycin: S <=0.25      -  Gentamicin: S <=1 Should not be used as monotherapy      -  Linezolid: S 4      -  Oxacillin: R >2      -  Penicillin: R >8      -  Rifampin: S <=1 Should not be used as monotherapy      -  Tetra/Doxy: S <=1      -  Trimethoprim/Sulfamethoxazole: S <=0.5/9.5      -  Vancomycin: S 2      Method Type: [facility]  Organism: Escherichia coli ESBL (23 Aug 2022 10:55)      -  Amikacin: S <=16      -  Amoxicillin/Clavulanic Acid: S <=8/4      -  Ampicillin: R >16 These ampicillin results predict results for amoxicillin      -  Ampicillin/Sulbactam: R 16/8 Enterobacter, Klebsiella aerogenes, Citrobacter, and Serratia may develop resistance during prolonged therapy (3-4 days)      -  Aztreonam: R 16      -  Cefazolin: R >16 Enterobacter, Klebsiella aerogenes, Citrobacter, and Serratia may develop resistance during prolonged therapy (3-4 days)      -  Cefepime: R 16      -  Ceftriaxone: R >32 Enterobacter, Klebsiella aerogenes, Citrobacter, and Serratia may develop resistance during prolonged therapy      -  Ciprofloxacin: R >2      -  Ertapenem: S <=0.5      -  Gentamicin: S <=2      -  Levofloxacin: R >4      -  Meropenem: S <=1      -  Piperacillin/Tazobactam: R <=8      -  Tobramycin: S <=2      -  Trimethoprim/Sulfamethoxazole: S <=0.5/9.5      Method Type: [facility]ERSON        RADIOLOGY:  imaging below personally reviewed and agree with findings  < from: MR Foot w/wo IV Cont, Left (08.22.22 @ 21:22) >  IMPRESSION:  1.  Septic arthritis/osteomyelitis centered at the fourth MTP, joint   owing to a lateral deep ulcer with soft tissue phlegmon extending to the   joint, with draining abscess to the skin at that level. Partial erosion   of the fourth metatarsal head is present as well as the base of the   fourth digit proximal phalanx. Osteomyelitis involves the entire distal   half of the fourth metatarsal and the fourth digit proximal phalanx.    2.  Reactive osteitis within the adjacent third metatarsal head.    3.  Status post transmetatarsal amputation of the fifth ray, without   stump osteomyelitis.    < end of copied text >  < from: Xray Foot AP + Lateral + Oblique, Left (08.21.22 @ 06:17) >    IMPRESSION:    Erosive changes about the lateral aspect of the left fourth metatarsal   head and proximal phalanx are suspicious for osteomyelitis. MRI can be   obtained for further characterization.    < end of copied text >   Patient is a 57y old  Male who presents with a chief complaint of pain/discharge from left small toe amputation site (24 Aug 2022 09:03)    HPI:  57F with IDDM c/b diabetic neuropathy, s/p L 5th toe amputation 3/2022 for ?gangrene, HTN, and CAD s/p CABG 7/2022 who presented with 4 days of pain and discharge from his prior L 5th toe amputation site concerning for osteomyelitis. ID consulted for OM.     Patient has been afebrile VSS  WBC stable 7, BMP wnl Cr 0.7  ESR 90, CRP 86  X-Ray: Erosive changes about the lateral aspect of the left fourth metatarsal head and proximal phalanx are suspicious for osteomyelitis.   MRI L foot: involvement of 4th MTP, 4th metatarsal head, base of 4th proximal phalanx. No stump osteomyelitis. Reactive osteitis of 3rd metatarsal head.   Abscess Cx: MRSA and ESBL E. coli    s/p Flagyl (8/21), Cefepime (8/21-8/22), Clinda (8/21)  Currently on meropenem  IVPB 1000 every 8 hours (8/23 --- ) and vancomycin  IVPB 1500 every 12 hours (8/21 --- )  Podiatry following, planned for partial 4th toe resection.     PAST MEDICAL & SURGICAL HISTORY:  Hypertension  Diabetes  IDDM, diabetic neuropathy  CAD (coronary artery disease)  s/p CABG July 2022  S/P amputation of lesser toe, left  March 2022  S/P CABG (coronary artery bypass graft)  July 2022      Allergies  No Known Allergies    ANTIMICROBIALS (past 90 days)  MEDICATIONS  (STANDING):  s/p Flagyl (8/21), Cefepime (8/21-8/22), Clinda (8/21)    meropenem  IVPB 1000 every 8 hours (8/23 --- )  vancomycin  IVPB 1500 every 12 hours (8/21 --- )    MEDICATIONS  (STANDING):  acetaminophen     Tablet .. 975 every 8 hours PRN  amLODIPine   Tablet 5 daily  aspirin  chewable 81 daily  atorvastatin 80 at bedtime  carvedilol 12.5 every 12 hours  dextrose 50% Injectable 25 once  dextrose 50% Injectable 12.5 once  dextrose 50% Injectable 25 once  dextrose Oral Gel 15 once PRN  furosemide    Tablet 40 daily  gabapentin 100 three times a day  glucagon  Injectable 1 once  heparin   Injectable 5000 every 8 hours  insulin glargine Injectable (LANTUS) 17 at bedtime  insulin lispro (ADMELOG) corrective regimen sliding scale  three times a day before meals  insulin lispro (ADMELOG) corrective regimen sliding scale  at bedtime  insulin lispro Injectable (ADMELOG) 5 three times a day before meals  levothyroxine 100 daily  losartan 25 daily  senna 2 at bedtime  traMADol 50 every 6 hours PRN    SOCIAL HISTORY:   Denies alcohol, tobacco, recreational drug use  Lives with family, no recent travel    FAMILY HISTORY:  No pertinent family history in first degree relatives      REVIEW OF SYSTEMS  [ x ] All other systems negative except as noted below:	    Constitutional:  [ ] fever [ ] chills  [ ] weight loss  [ ] weakness  Skin:  [ ] rash [ ] phlebitis	  Eyes: [ ] icterus [ ] pain  [ ] discharge	  ENMT: [ ] sore throat  [ ] thrush [ ] ulcers [ ] exudates  Respiratory: [ ] dyspnea [ ] hemoptysis [ ] cough [ ] sputum	  Cardiovascular:  [ ] chest pain [ ] palpitations [ ] edema	  Gastrointestinal:  [ ] nausea [ ] vomiting [ ] diarrhea [ ] constipation [ ] pain	  Genitourinary:  [ ] dysuria [ ] frequency [ ] hematuria [ ] discharge [ ] flank pain  [ ] incontinence  Musculoskeletal:  [ ] myalgias [ ] arthralgias [ ] arthritis  [ ] back pain  Neurological:  [ ] headache [ ] seizures  [ ] confusion/altered mental status  Psychiatric:  [ ] anxiety [ ] depression	  Hematology/Lymphatics:  [ ] lymphadenopathy  Endocrine:  [ ] adrenal [ ] thyroid  Allergic/Immunologic:	 [ ] transplant [ ] seasonal    Vital Signs Last 24 Hrs  T(F): 97.4 (08-24-22 @ 05:11), Max: 98.5 (08-22-22 @ 21:58)  Vital Signs Last 24 Hrs  HR: 72 (08-24-22 @ 05:11) (71 - 81)  BP: 140/75 (08-24-22 @ 05:11) (128/67 - 152/79)  RR: 18 (08-24-22 @ 05:11)  SpO2: 97% (08-24-22 @ 05:11) (97% - 100%)  Wt(kg): --    Physical Exam:  Constitutional:  well preserved, comfortable  Head/Eyes: no icterus   ENT:  supple; no thrush  LUNGS:  CTA  CVS:  regular rate   Abd:  soft, non-tender; non-distended  Ext:  no edema +L foot bandage, no obvious drainage   Vascular:  IV site no erythema tenderness or discharge  MSK:  joints without swelling  Neuro: AAO X 3, non- focal                            10.2   7.88  )-----------( 410      ( 24 Aug 2022 06:35 )             32.1   08-24    141  |  104  |  10  ----------------------------<  87  3.5   |  27  |  0.74    Ca    9.2      24 Aug 2022 06:35  Phos  3.5     08-24  Mg     1.60     08-24      MICROBIOLOGY:  Vancomycin Level, Trough: 12.3 (08-23 @ 09:32)    Culture - Abscess with Gram Stain (collected 21 Aug 2022 10:25)  Source: .Abscess foot  Preliminary Report (23 Aug 2022 12:57):    Moderate Methicillin Resistant Staphylococcus aureus    Few Escherichia coli ESBL    Few Alpha hemolytic strep "Susceptibilities not performed"    Moderate Actinomyces turicensis "Susceptibilities not performed"  Organism: Methicillin resistant Staphylococcus aureus  Escherichia coli ESBL (23 Aug 2022 12:57)  Organism: Methicillin resistant Staphylococcus aureus (23 Aug 2022 12:57)      -  Ampicillin/Sulbactam: R <=8/4      -  Cefazolin: R <=4      -  Clindamycin: S <=0.25      -  Daptomycin: S 0.5      -  Erythromycin: S <=0.25      -  Gentamicin: S <=1 Should not be used as monotherapy      -  Linezolid: S 4      -  Oxacillin: R >2      -  Penicillin: R >8      -  Rifampin: S <=1 Should not be used as monotherapy      -  Tetra/Doxy: S <=1      -  Trimethoprim/Sulfamethoxazole: S <=0.5/9.5      -  Vancomycin: S 2      Method Type: PETERSON  Organism: Escherichia coli ESBL (23 Aug 2022 10:55)      -  Amikacin: S <=16      -  Amoxicillin/Clavulanic Acid: S <=8/4      -  Ampicillin: R >16 These ampicillin results predict results for amoxicillin      -  Ampicillin/Sulbactam: R 16/8 Enterobacter, Klebsiella aerogenes, Citrobacter, and Serratia may develop resistance during prolonged therapy (3-4 days)      -  Aztreonam: R 16      -  Cefazolin: R >16 Enterobacter, Klebsiella aerogenes, Citrobacter, and Serratia may develop resistance during prolonged therapy (3-4 days)      -  Cefepime: R 16      -  Ceftriaxone: R >32 Enterobacter, Klebsiella aerogenes, Citrobacter, and Serratia may develop resistance during prolonged therapy      -  Ciprofloxacin: R >2      -  Ertapenem: S <=0.5      -  Gentamicin: S <=2      -  Levofloxacin: R >4      -  Meropenem: S <=1      -  Piperacillin/Tazobactam: R <=8      -  Tobramycin: S <=2      -  Trimethoprim/Sulfamethoxazole: S <=0.5/9.5      Method Type: PETERSON        RADIOLOGY:  imaging below personally reviewed and agree with findings  < from: MR Foot w/wo IV Cont, Left (08.22.22 @ 21:22) >  IMPRESSION:  1.  Septic arthritis/osteomyelitis centered at the fourth MTP, joint   owing to a lateral deep ulcer with soft tissue phlegmon extending to the   joint, with draining abscess to the skin at that level. Partial erosion   of the fourth metatarsal head is present as well as the base of the   fourth digit proximal phalanx. Osteomyelitis involves the entire distal   half of the fourth metatarsal and the fourth digit proximal phalanx.    2.  Reactive osteitis within the adjacent third metatarsal head.    3.  Status post transmetatarsal amputation of the fifth ray, without   stump osteomyelitis.    < end of copied text >  < from: Xray Foot AP + Lateral + Oblique, Left (08.21.22 @ 06:17) >    IMPRESSION:    Erosive changes about the lateral aspect of the left fourth metatarsal   head and proximal phalanx are suspicious for osteomyelitis. MRI can be   obtained for further characterization.    < end of copied text >

## 2022-08-24 NOTE — PROGRESS NOTE ADULT - ASSESSMENT
56 y/o man with MHX of IDDM c/b diabetic neuropathy, s/p L 5th toe amputation 3/2022 for ?gangrene, HTN, and CAD s/p CABG 7/2022 who presented with 4 days of pain and discharge from his prior L 5th toe amputation site c/f osteomyelitis. Podiatry following.       Patient has a history of CABG in July 2022. He is having no active chest pain, able to walk 4 blocks and at least 1 flight of stairs without chest pain. Not volume overloaded EKG shows new t-wave abnormalities, unclear baseline since CABG. Revised cardiac risk index (RCRI) Class III with history of positive exercise stress test and pre-operative treatment with insulin. This equates to a 10.1% 30-day mortality risk. This is a low risk procedure, localized to the foot with local anesthesia and light IV sedation. Patient is medically optimized for surgery at this time.

## 2022-08-24 NOTE — CONSULT NOTE ADULT - ATTENDING COMMENTS
Vancomycin 1.25GM was probably enough. Would stick with this dose.   No Pseudomonas, can narrow Ellie to Erta.   Final course/duration depends on how surgery goes.     Discussed with medicine     Carlos Haywood MD   Infectious Disease   Available on TEAMS. After 5PM and on weekends please page fellow on call or call [phone]

## 2022-08-24 NOTE — PROGRESS NOTE ADULT - PROBLEM SELECTOR PLAN 7
No reported hypothyroidism by patient. Per pharmacy, patient is on levothyroxine 0.1 mg QD.   - continue home dose  - TSH to eval No reported hypothyroidism by patient. Per pharmacy, patient is on levothyroxine 0.1 mg QD.   - continue home dose  - TSH 3.92

## 2022-08-24 NOTE — PROGRESS NOTE ADULT - SUBJECTIVE AND OBJECTIVE BOX
Podiatry pager #: [phone] ([facility])/ [phone] ([facility])    Patient is a 57y old  Male who presents with a chief complaint of pain/discharge from left small toe amputation site (24 Aug 2022 11:43)       INTERVAL HPI/OVERNIGHT EVENTS:  Patient seen and evaluated at bedside.  Pt is resting comfortable in NAD. Denies N/V/F/C.     Allergies    No Known Allergies    Intolerances        Vital Signs Last 24 Hrs  T(C): 37.1 (24 Aug 2022 12:39), Max: 37.1 (24 Aug 2022 12:39)  T(F): 98.7 (24 Aug 2022 12:39), Max: 98.7 (24 Aug 2022 12:39)  HR: 70 (24 Aug 2022 12:39) (70 - 81)  BP: 147/78 (24 Aug 2022 12:39) (128/67 - 152/79)  BP(mean): --  RR: 18 (24 Aug 2022 12:39) (18 - 18)  SpO2: 100% (24 Aug 2022 12:39) (97% - 100%)    Parameters below as of 24 Aug 2022 12:39  Patient On (Oxygen Delivery Method): room air        LABS:                        10.2   7.88  )-----------( 410      ( 24 Aug 2022 06:35 )             32.1     08-24    141  |  104  |  10  ----------------------------<  87  3.5   |  27  |  0.74    Ca    9.2      24 Aug 2022 06:35  Phos  3.5     08-24  Mg     1.60     08-24          CAPILLARY BLOOD GLUCOSE      POCT Blood Glucose.: 161 mg/dL (24 Aug 2022 12:29)  POCT Blood Glucose.: 128 mg/dL (24 Aug 2022 08:34)  POCT Blood Glucose.: 212 mg/dL (23 Aug 2022 21:19)  POCT Blood Glucose.: 218 mg/dL (23 Aug 2022 17:48)      Lower Extremity Physical Exam:  Vascular: DP/PT 2/4 B/L, CFT <3sec x 10, Temp gradient warm to warm of left foot, warm to cool of Right foot.    Neurology: Epicritic sensation decreased to level of forefoot, B/L  Musculoskeletal/Ortho: Pain on palpation over the lateral forefoot of the left foot.   Skin: Left foot fibronecrotic wound local to the lateral fourth ray, probes to bone and tracks medially and plantarly, No pus today, malodor mildly improved   RADIOLOGY & ADDITIONAL TESTS:

## 2022-08-24 NOTE — PROGRESS NOTE ADULT - PROBLEM SELECTOR PLAN 3
On Amlodipine 5 mg QD at home per patient. BP in 140s/80s. Per med rec from pharmacy, also on Carvedilol 12.5 mg BID, Furosemide 40 mg QD, nifedipine 60 mg QD and Losartan 25 mg QD  - continue amlodipine, carvedilol, furosemide, losartan Only on Amlodipine 7.5 mg QD at home per patient. BP in 140s/80s. Per med rec from pharmacy, also on Carvedilol 12.5 mg BID, Furosemide 40 mg QD, nifedipine 60 mg QD and Losartan 25 mg QD.    - /75 on amlodipine, carvedilol, furosemide, losartan Diet: Diabetic diet. NPO at midnight before surgery  DVT ppx: lovenox  Dispo: admit to medicine    Patient has asked that the team not call his wife. If she is present physically when things are being discussed, that is okay.

## 2022-08-24 NOTE — PROGRESS NOTE ADULT - PROBLEM SELECTOR PLAN 1
Subacute pain and discharge from amputation site of L small toe. Probe to bone on exam on podiatric exam. XR c/f osteomyelitis of left fourth metatarsal head and proximal phalanx. CRP 86, ESR 80. WBC 10.28. cefepime 2g IVPB, clindamycin 900 mg IVPB, vancomycin 1g IVPB given in ED.  - 3 days vancomycin, cefepime for empiric treatment of osteomyelitis in a diabetic patient. Switched to vancomycin and meropenem based on sensitivities for MRSA and ESBL E. coli from final wound culture.   - d/c flagyl given prelim wound culture negative for anaerobes  - MRI L foot: involvement of 4th MTP, 4th metatarsal head, base of 4th proximal phalanx. No stump osteomyelitis. Reactive osteitis of 3rd metatarsal head.   - Podiatry: likely partial 4th resection of left foot pending MRI results. F/u recs. If patient refuses surgery, will need ID consult.   - Wound culture final: MRSA and ESBL E. coli  [] f/u final wound culture  [] vanc trough 12.3 8/23 in AM Subacute pain and discharge from amputation site of L small toe. Probe to bone on exam on podiatric exam. XR c/f osteomyelitis of left fourth metatarsal head and proximal phalanx. CRP 86, ESR 80. WBC 10.28. cefepime 2g IVPB, clindamycin 900 mg IVPB, vancomycin 1g IVPB given in ED.  - 3 days vancomycin, cefepime for empiric treatment of osteomyelitis in a diabetic patient. Now day 2 of vancomycin and meropenem based on sensitivities for MRSA and ESBL E. coli from final wound culture. Vancomycin dosed by trough (goal 15-20)  - MRI L foot: involvement of 4th MTP, 4th metatarsal head, base of 4th proximal phalanx. No stump osteomyelitis. Reactive osteitis of 3rd metatarsal head.   - Podiatry: scheduled for partial 4th resection of left foot based on MRI results. F/u recs. If patient refuses surgery, will need ID consult. Possible vascular surgery consult for diminished pulses.   - Wound culture final: MRSA and ESBL E. coli Subacute pain and discharge from amputation site of L small toe. Probe to bone on exam on podiatric exam. XR c/f osteomyelitis of left fourth metatarsal head and proximal phalanx. CRP 86, ESR 80. WBC 10.28.   --- Wound culture final: MRSA and ESBL E. coli  --- MRI L foot: involvement of 4th MTP, 4th metatarsal head, base of 4th proximal phalanx. No stump osteomyelitis. Reactive osteitis of 3rd metatarsal head.   - Day 2 of vanc and meropenem based on wound culture sensitivities. Vancomycin dosed by trough (goal 15-20).  - Podiatry: scheduled for partial 4th resection of left foot. Possible [facility]-PVR vs vascular surgery consult for diminished pulses, pending Podiatry fecs.  - ID consulted to determine antibiotic coverage for after surgery/discharge given limited PO options based on sensitivities Subacute pain and discharge from amputation site of L small toe. Probe to bone on exam on podiatric exam. XR c/f osteomyelitis of left fourth metatarsal head and proximal phalanx. CRP 86, ESR 80. WBC 10.28.   --- Wound culture final: MRSA and ESBL E. coli. Few alpha hemolytic streph and moderate actinomyces turicensis seen but no susceptibilities performed.   --- MRI L foot: involvement of 4th MTP, 4th metatarsal head, base of 4th proximal phalanx. No stump osteomyelitis. Reactive osteitis of 3rd metatarsal head.   - Day 2 of vanc and meropenem based on wound culture sensitivities. Vancomycin dosed by trough (goal 15-20).  - Podiatry: scheduled for partial 4th resection of left foot. Possible [facility]-PVR vs vascular surgery consult for diminished pulses, pending Podiatry fecs.  - ID consulted to determine antibiotic coverage for after surgery/discharge given limited PO options based on sensitivities

## 2022-08-24 NOTE — CONSULT NOTE ADULT - ASSESSMENT
WORK UP          DIAGNOSIS and IMPRESSION          RECOMMENDATIONS        PT TO BE SEEN. PRELIM NOTE  PENDING RECS. PLEASE WAIT FOR FINAL RECS AFTER DISCUSSION WITH ATTENDING#    Rony Mckeon DO, PGY-4   ID fellow  [employer] Teams Preferred  After 5pm/weekends call [phone]   WORK UP  WBC stable 7, BMP wnl Cr 0.7  ESR 90, CRP 86  X-Ray: Erosive changes about the lateral aspect of the left fourth metatarsal head and proximal phalanx are suspicious for osteomyelitis.   MRI L foot: involvement of 4th MTP, 4th metatarsal head, base of 4th proximal phalanx. No stump osteomyelitis. Reactive osteitis of 3rd metatarsal head.   Abscess Cx: MRSA and ESBL E. coli    s/p Flagyl (8/21), Cefepime (8/21-8/22), Clinda (8/21)  Currently on meropenem  IVPB 1000 every 8 hours (8/23 --- ) and vancomycin  IVPB 1500 every 12 hours (8/21 --- )    DIAGNOSIS and IMPRESSION  57F with IDDM c/b diabetic neuropathy, s/p L 5th toe amputation 3/2022 for ?gangrene, HTN, and CAD s/p CABG 7/2022 who presented with 4 days of pain and discharge from his prior L 5th toe amputation site concerning for osteomyelitis. ID consulted for OM.     #4th MTP Osteomyelitis  #Foot Abscess with MRSA and ESBL E.coli    RECOMMENDATIONS  - c/w Vancomycin 1500mg BID, obtain vanco trough prior to 4th dose   - switch Ellie to Ertapenem 1G q24  - obtain BCx x2  - Podiatry following with plans for OR, final Antibiotic management based on OR procedure      PT TO BE SEEN. PRELIM NOTE  PENDING RECS. PLEASE WAIT FOR FINAL RECS AFTER DISCUSSION WITH ATTENDINGDyana Mckeon DO, PGY-4   ID fellow  [employer] Teams Preferred  After 5pm/weekends call [phone]   WORK UP  WBC stable 7, BMP wnl Cr 0.7  ESR 90, CRP 86  X-Ray: Erosive changes about the lateral aspect of the left fourth metatarsal head and proximal phalanx are suspicious for osteomyelitis.   MRI L foot: involvement of 4th MTP, 4th metatarsal head, base of 4th proximal phalanx. No stump osteomyelitis. Reactive osteitis of 3rd metatarsal head.   Abscess Cx: MRSA and ESBL E. coli    s/p Flagyl (8/21), Cefepime (8/21-8/22), Clinda (8/21)  Currently on meropenem  IVPB 1000 every 8 hours (8/23 --- ) and vancomycin  IVPB 1500 every 12 hours (8/21 --- )    DIAGNOSIS and IMPRESSION  57F with IDDM c/b diabetic neuropathy, s/p L 5th toe amputation 3/2022 for ?gangrene, HTN, and CAD s/p CABG 7/2022 who presented with 4 days of pain and discharge from his prior L 5th toe amputation site concerning for osteomyelitis. ID consulted for OM.     #4th MTP Osteomyelitis  #Foot Abscess with MRSA and ESBL E.coli    RECOMMENDATIONS  - switch Vancomycin dose to 1250mg BID, vanco trough prior to 4th dose   - switch Ellie to Ertapenem 1G q24  - Podiatry following with plans for OR, final Antibiotic management based on OR procedure    D/w Attending    Rony Mckeon DO, PGY-4   ID fellow  [employer] Teams Preferred  After 5pm/weekends call [phone]

## 2022-08-25 ENCOUNTER — TRANSCRIPTION ENCOUNTER (OUTPATIENT)
Age: 57
End: 2022-08-25

## 2022-08-25 LAB
ANION GAP SERPL CALC-SCNC: 9 MMOL/L — SIGNIFICANT CHANGE UP (ref 7–14)
APTT BLD: 32 SEC — SIGNIFICANT CHANGE UP (ref 27–36.3)
BLD GP AB SCN SERPL QL: NEGATIVE — SIGNIFICANT CHANGE UP
BUN SERPL-MCNC: 12 MG/DL — SIGNIFICANT CHANGE UP (ref 7–23)
CALCIUM SERPL-MCNC: 9.2 MG/DL — SIGNIFICANT CHANGE UP (ref 8.4–10.5)
CHLORIDE SERPL-SCNC: 102 MMOL/L — SIGNIFICANT CHANGE UP (ref 98–107)
CO2 SERPL-SCNC: 27 MMOL/L — SIGNIFICANT CHANGE UP (ref 22–31)
CREAT SERPL-MCNC: 0.69 MG/DL — SIGNIFICANT CHANGE UP (ref 0.5–1.3)
EGFR: 107 ML/MIN/1.73M2 — SIGNIFICANT CHANGE UP
GLUCOSE BLDC GLUCOMTR-MCNC: 123 MG/DL — HIGH (ref 70–99)
GLUCOSE BLDC GLUCOMTR-MCNC: 129 MG/DL — HIGH (ref 70–99)
GLUCOSE BLDC GLUCOMTR-MCNC: 148 MG/DL — HIGH (ref 70–99)
GLUCOSE BLDC GLUCOMTR-MCNC: 161 MG/DL — HIGH (ref 70–99)
GLUCOSE BLDC GLUCOMTR-MCNC: 182 MG/DL — HIGH (ref 70–99)
GLUCOSE BLDC GLUCOMTR-MCNC: 204 MG/DL — HIGH (ref 70–99)
GLUCOSE BLDC GLUCOMTR-MCNC: 221 MG/DL — HIGH (ref 70–99)
GLUCOSE SERPL-MCNC: 163 MG/DL — HIGH (ref 70–99)
GRAM STN FLD: SIGNIFICANT CHANGE UP
HCT VFR BLD CALC: 34.1 % — LOW (ref 39–50)
HGB BLD-MCNC: 11 G/DL — LOW (ref 13–17)
INR BLD: 1.29 RATIO — HIGH (ref 0.88–1.16)
MAGNESIUM SERPL-MCNC: 1.6 MG/DL — SIGNIFICANT CHANGE UP (ref 1.6–2.6)
MCHC RBC-ENTMCNC: 29.3 PG — SIGNIFICANT CHANGE UP (ref 27–34)
MCHC RBC-ENTMCNC: 32.3 GM/DL — SIGNIFICANT CHANGE UP (ref 32–36)
MCV RBC AUTO: 90.9 FL — SIGNIFICANT CHANGE UP (ref 80–100)
NRBC # BLD: 0 /100 WBCS — SIGNIFICANT CHANGE UP (ref 0–0)
NRBC # FLD: 0 K/UL — SIGNIFICANT CHANGE UP (ref 0–0)
PHOSPHATE SERPL-MCNC: 3.6 MG/DL — SIGNIFICANT CHANGE UP (ref 2.5–4.5)
PLATELET # BLD AUTO: 401 K/UL — HIGH (ref 150–400)
POTASSIUM SERPL-MCNC: 3.3 MMOL/L — LOW (ref 3.5–5.3)
POTASSIUM SERPL-SCNC: 3.3 MMOL/L — LOW (ref 3.5–5.3)
PROTHROM AB SERPL-ACNC: 15 SEC — HIGH (ref 10.5–13.4)
RBC # BLD: 3.75 M/UL — LOW (ref 4.2–5.8)
RBC # FLD: 13.3 % — SIGNIFICANT CHANGE UP (ref 10.3–14.5)
RH IG SCN BLD-IMP: POSITIVE — SIGNIFICANT CHANGE UP
SARS-COV-2 RNA SPEC QL NAA+PROBE: SIGNIFICANT CHANGE UP
SODIUM SERPL-SCNC: 138 MMOL/L — SIGNIFICANT CHANGE UP (ref 135–145)
SPECIMEN SOURCE: SIGNIFICANT CHANGE UP
WBC # BLD: 8.38 K/UL — SIGNIFICANT CHANGE UP (ref 3.8–10.5)
WBC # FLD AUTO: 8.38 K/UL — SIGNIFICANT CHANGE UP (ref 3.8–10.5)

## 2022-08-25 PROCEDURE — 88311 DECALCIFY TISSUE: CPT | Mod: 26

## 2022-08-25 PROCEDURE — 88305 TISSUE EXAM BY PATHOLOGIST: CPT | Mod: 26

## 2022-08-25 PROCEDURE — 99232 SBSQ HOSP IP/OBS MODERATE 35: CPT

## 2022-08-25 PROCEDURE — 88307 TISSUE EXAM BY PATHOLOGIST: CPT | Mod: 26

## 2022-08-25 PROCEDURE — 73630 X-RAY EXAM OF FOOT: CPT | Mod: 26,LT

## 2022-08-25 PROCEDURE — 99232 SBSQ HOSP IP/OBS MODERATE 35: CPT | Mod: GC

## 2022-08-25 RX ORDER — INSULIN GLARGINE 100 [IU]/ML
17 INJECTION, SOLUTION SUBCUTANEOUS AT BEDTIME
Refills: 0 | Status: DISCONTINUED | OUTPATIENT
Start: 2022-08-25 | End: 2022-08-28

## 2022-08-25 RX ORDER — HEPARIN SODIUM 5000 [USP'U]/ML
5000 INJECTION INTRAVENOUS; SUBCUTANEOUS EVERY 8 HOURS
Refills: 0 | Status: DISCONTINUED | OUTPATIENT
Start: 2022-08-25 | End: 2022-08-25

## 2022-08-25 RX ORDER — HYDROMORPHONE HYDROCHLORIDE 2 MG/ML
0.5 INJECTION INTRAMUSCULAR; INTRAVENOUS; SUBCUTANEOUS
Refills: 0 | Status: DISCONTINUED | OUTPATIENT
Start: 2022-08-25 | End: 2022-08-25

## 2022-08-25 RX ORDER — HEPARIN SODIUM 5000 [USP'U]/ML
5000 INJECTION INTRAVENOUS; SUBCUTANEOUS EVERY 8 HOURS
Refills: 0 | Status: DISCONTINUED | OUTPATIENT
Start: 2022-08-26 | End: 2022-08-28

## 2022-08-25 RX ORDER — ACETAMINOPHEN 500 MG
650 TABLET ORAL EVERY 6 HOURS
Refills: 0 | Status: DISCONTINUED | OUTPATIENT
Start: 2022-08-25 | End: 2022-08-26

## 2022-08-25 RX ORDER — ONDANSETRON 8 MG/1
4 TABLET, FILM COATED ORAL ONCE
Refills: 0 | Status: DISCONTINUED | OUTPATIENT
Start: 2022-08-25 | End: 2022-08-25

## 2022-08-25 RX ORDER — MAGNESIUM SULFATE 500 MG/ML
1 VIAL (ML) INJECTION ONCE
Refills: 0 | Status: COMPLETED | OUTPATIENT
Start: 2022-08-25 | End: 2022-08-25

## 2022-08-25 RX ORDER — OXYCODONE HYDROCHLORIDE 5 MG/1
5 TABLET ORAL ONCE
Refills: 0 | Status: DISCONTINUED | OUTPATIENT
Start: 2022-08-25 | End: 2022-08-25

## 2022-08-25 RX ORDER — POTASSIUM CHLORIDE 20 MEQ
40 PACKET (EA) ORAL EVERY 4 HOURS
Refills: 0 | Status: COMPLETED | OUTPATIENT
Start: 2022-08-25 | End: 2022-08-25

## 2022-08-25 RX ADMIN — INSULIN GLARGINE 17 UNIT(S): 100 INJECTION, SOLUTION SUBCUTANEOUS at 23:17

## 2022-08-25 RX ADMIN — Medication 40 MILLIEQUIVALENT(S): at 09:25

## 2022-08-25 RX ADMIN — Medication 100 GRAM(S): at 09:25

## 2022-08-25 RX ADMIN — Medication 975 MILLIGRAM(S): at 20:50

## 2022-08-25 RX ADMIN — ERTAPENEM SODIUM 120 MILLIGRAM(S): 1 INJECTION, POWDER, LYOPHILIZED, FOR SOLUTION INTRAMUSCULAR; INTRAVENOUS at 22:51

## 2022-08-25 RX ADMIN — OXYCODONE HYDROCHLORIDE 5 MILLIGRAM(S): 5 TABLET ORAL at 23:17

## 2022-08-25 RX ADMIN — Medication 166.67 MILLIGRAM(S): at 18:08

## 2022-08-25 RX ADMIN — Medication 100 MICROGRAM(S): at 05:37

## 2022-08-25 RX ADMIN — CARVEDILOL PHOSPHATE 12.5 MILLIGRAM(S): 80 CAPSULE, EXTENDED RELEASE ORAL at 05:37

## 2022-08-25 RX ADMIN — Medication 40 MILLIGRAM(S): at 05:37

## 2022-08-25 RX ADMIN — GABAPENTIN 100 MILLIGRAM(S): 400 CAPSULE ORAL at 05:37

## 2022-08-25 RX ADMIN — CARVEDILOL PHOSPHATE 12.5 MILLIGRAM(S): 80 CAPSULE, EXTENDED RELEASE ORAL at 18:13

## 2022-08-25 RX ADMIN — Medication 40 MILLIEQUIVALENT(S): at 15:46

## 2022-08-25 RX ADMIN — GABAPENTIN 100 MILLIGRAM(S): 400 CAPSULE ORAL at 22:59

## 2022-08-25 RX ADMIN — ATORVASTATIN CALCIUM 80 MILLIGRAM(S): 80 TABLET, FILM COATED ORAL at 22:59

## 2022-08-25 RX ADMIN — LOSARTAN POTASSIUM 25 MILLIGRAM(S): 100 TABLET, FILM COATED ORAL at 05:38

## 2022-08-25 RX ADMIN — Medication 166.67 MILLIGRAM(S): at 05:37

## 2022-08-25 RX ADMIN — Medication 975 MILLIGRAM(S): at 19:50

## 2022-08-25 RX ADMIN — Medication 5 UNIT(S): at 19:32

## 2022-08-25 RX ADMIN — TRAMADOL HYDROCHLORIDE 50 MILLIGRAM(S): 50 TABLET ORAL at 18:15

## 2022-08-25 RX ADMIN — AMLODIPINE BESYLATE 5 MILLIGRAM(S): 2.5 TABLET ORAL at 05:38

## 2022-08-25 NOTE — PROGRESS NOTE ADULT - SUBJECTIVE AND OBJECTIVE BOX
Follow Up: No complaints this morning. Patient planned for OR later this afternoon.       Interval History/ROS:Patient is a 57y old  Male who presents with a chief complaint of pain/discharge from left small toe amputation site (25 Aug 2022 07:04)      REVIEW OF SYSTEMS  [ x ] All other systems negative except as noted below    Constitutional:  [ ] fever [ ] chills  [ ] weight loss  [ ]night sweat  [ ]poor appetite/PO intake [ ]fatigue   Skin:  [ ] rash [ ] phlebitis	  Eyes: [ ] icterus [ ] pain  [ ] discharge	  ENMT: [ ] sore throat  [ ] thrush [ ] ulcers [ ] exudates [ ]anosmia  Respiratory: [ ] dyspnea [ ] hemoptysis [ ] cough [ ] sputum	  Cardiovascular:  [ ] chest pain [ ] palpitations [ ] edema	  Gastrointestinal:  [ ] nausea [ ] vomiting [ ] diarrhea [ ] constipation [ ] pain	  Genitourinary:  [ ] dysuria [ ] frequency [ ] hematuria [ ] discharge [ ] flank pain  [ ] incontinence  Musculoskeletal:  [ ] myalgias [ ] arthralgias [ ] arthritis  [ ] back pain  Neurological:  [ ] headache [ ] weakness [ ] seizures  [ ] confusion/altered mental status    Allergies  No Known Allergies    ANTIMICROBIALS:    ertapenem  IVPB 1000 every 24 hours  vancomycin  IVPB 1250 every 12 hours    OTHER MEDS: MEDICATIONS  (STANDING):  acetaminophen     Tablet .. 975 every 8 hours PRN  amLODIPine   Tablet 5 daily  aspirin  chewable 81 daily  atorvastatin 80 at bedtime  carvedilol 12.5 every 12 hours  dextrose 50% Injectable 25 once  dextrose 50% Injectable 12.5 once  dextrose 50% Injectable 25 once  dextrose Oral Gel 15 once PRN  furosemide    Tablet 40 daily  gabapentin 100 three times a day  glucagon  Injectable 1 once  insulin glargine Injectable (LANTUS) 9 at bedtime  insulin lispro (ADMELOG) corrective regimen sliding scale  three times a day before meals  insulin lispro (ADMELOG) corrective regimen sliding scale  at bedtime  insulin lispro Injectable (ADMELOG) 5 three times a day before meals  levothyroxine 100 daily  losartan 25 daily  senna 2 at bedtime  traMADol 50 every 6 hours PRN      Vital Signs Last 24 Hrs  T(F): 97.6 (08-25-22 @ 05:35), Max: 98.7 (08-24-22 @ 12:39)    Vital Signs Last 24 Hrs  HR: 78 (08-25-22 @ 05:35) (70 - 88)  BP: 157/88 (08-25-22 @ 05:35) (147/78 - 159/90)  RR: 17 (08-25-22 @ 05:35)  SpO2: 99% (08-25-22 @ 05:35) (99% - 100%)  Wt(kg): --    EXAM:  Constitutional:  well preserved, comfortable  Head/Eyes: no icterus   ENT:  supple; no thrush  LUNGS:  CTA  CVS:  regular rate   Abd:  soft, non-tender; non-distended  Ext:  no edema +L foot bandage, no obvious drainage   Vascular:  IV site no erythema tenderness or discharge  MSK:  joints without swelling  Neuro: AAO X 3, non- focal    Labs:                        10.2   7.88  )-----------( 410      ( 24 Aug 2022 06:35 )             32.1     08-24    141  |  104  |  10  ----------------------------<  87  3.5   |  27  |  0.74    Ca    9.2      24 Aug 2022 06:35  Phos  3.5     08-24  Mg     1.60     08-24        WBC Trend:  WBC Count: 7.88 (08-24-22 @ 06:35)  WBC Count: 7.45 (08-23-22 @ 09:32)  WBC Count: 7.59 (08-22-22 @ 05:30)  WBC Count: 10.28 (08-21-22 @ 05:28)      Creatine Trend:  Creatinine, Serum: 0.74 (08-24)  Creatinine, Serum: 0.69 (08-23)  Creatinine, Serum: 0.64 (08-22)  Creatinine, Serum: 0.69 (08-21)      Liver Biochemical Testing Trend:  Alanine Aminotransferase (ALT/SGPT): 10 (08-22)  Aspartate Aminotransferase (AST/SGOT): 19 (08-22-22 @ 05:30)  Bilirubin Total, Serum: 0.3 (08-22)      Trend LDH    MICROBIOLOGY:  Vancomycin Level, Trough: 12.3 (08-23 @ 09:32)    Culture - Abscess with Gram Stain (collected 21 Aug 2022 10:25)  Source: .Abscess foot  Preliminary Report:    Moderate Methicillin Resistant Staphylococcus aureus    Few Escherichia coli ESBL    Few Alpha hemolytic strep "Susceptibilities not performed"    Moderate Actinomyces turicensis "Susceptibilities not performed"  Organism: Methicillin resistant Staphylococcus aureus  Escherichia coli ESBL  Organism: Methicillin resistant Staphylococcus aureus    Sensitivities:      -  Ampicillin/Sulbactam: R <=8/4      -  Cefazolin: R <=4      -  Clindamycin: S <=0.25      -  Daptomycin: S 0.5      -  Erythromycin: S <=0.25      -  Gentamicin: S <=1 Should not be used as monotherapy      -  Linezolid: S 4      -  Oxacillin: R >2      -  Penicillin: R >8      -  Rifampin: S <=1 Should not be used as monotherapy      -  Tetra/Doxy: S <=1      -  Trimethoprim/Sulfamethoxazole: S <=0.5/9.5      -  Vancomycin: S 2      Method Type: PETERSON  Organism: Escherichia coli ESBL    Sensitivities:      -  Amikacin: S <=16      -  Amoxicillin/Clavulanic Acid: S <=8/4      -  Ampicillin: R >16 These ampicillin results predict results for amoxicillin      -  Ampicillin/Sulbactam: R 16/8 Enterobacter, Klebsiella aerogenes, Citrobacter, and Serratia may develop resistance during prolonged therapy (3-4 days)      -  Aztreonam: R 16      -  Cefazolin: R >16 Enterobacter, Klebsiella aerogenes, Citrobacter, and Serratia may develop resistance during prolonged therapy (3-4 days)      -  Cefepime: R 16      -  Ceftriaxone: R >32 Enterobacter, Klebsiella aerogenes, Citrobacter, and Serratia may develop resistance during prolonged therapy      -  Ciprofloxacin: R >2      -  Ertapenem: S <=0.5      -  Gentamicin: S <=2      -  Levofloxacin: R >4      -  Meropenem: S <=1      -  Piperacillin/Tazobactam: R <=8      -  Tobramycin: S <=2      -  Trimethoprim/Sulfamethoxazole: S <=0.5/9.5      Method Type: PETERSON    Culture - Urine (collected 24 Jan 2021 11:25)  Source: .Urine Clean Catch (Midstream)  Final Report:    50,000 - 99,000 CFU/mL Streptococcus agalactiae (Group B) isolated    Group B streptococci are susceptible to ampicillin,    penicillin and cefazolin, but may be resistant to    erythromycin and clindamycin.    Recommendations for intrapartum prophylaxis for Group B    streptococci are penicillin or ampicillin.    COVID-19 PCR: NotDetec (08-25-22 @ 01:30)    C-Reactive Protein, Serum: 86.0 (08-21)    Ferritin, Serum: 898 (08-22)    Sedimentation Rate, Erythrocyte: 90 mm/hr (08-21-22 @ 05:28)   Follow Up: foot OM     Interval History/ROS: No complaints this morning. Patient planned for OR later this afternoon.   Asks how long he'll have to be in the hospital after surgery. Asks if he can eat after surgery.       Allergies  No Known Allergies    ANTIMICROBIALS:    ertapenem  IVPB 1000 every 24 hours  vancomycin  IVPB 1250 every 12 hours    OTHER MEDS: MEDICATIONS  (STANDING):  acetaminophen     Tablet .. 975 every 8 hours PRN  amLODIPine   Tablet 5 daily  aspirin  chewable 81 daily  atorvastatin 80 at bedtime  carvedilol 12.5 every 12 hours  dextrose 50% Injectable 25 once  dextrose 50% Injectable 12.5 once  dextrose 50% Injectable 25 once  dextrose Oral Gel 15 once PRN  furosemide    Tablet 40 daily  gabapentin 100 three times a day  glucagon  Injectable 1 once  insulin glargine Injectable (LANTUS) 9 at bedtime  insulin lispro (ADMELOG) corrective regimen sliding scale  three times a day before meals  insulin lispro (ADMELOG) corrective regimen sliding scale  at bedtime  insulin lispro Injectable (ADMELOG) 5 three times a day before meals  levothyroxine 100 daily  losartan 25 daily  senna 2 at bedtime  traMADol 50 every 6 hours PRN      Vital Signs Last 24 Hrs  T(F): 97.6 (08-25-22 @ 05:35), Max: 98.7 (08-24-22 @ 12:39)    Vital Signs Last 24 Hrs  HR: 78 (08-25-22 @ 05:35) (70 - 88)  BP: 157/88 (08-25-22 @ 05:35) (147/78 - 159/90)  RR: 17 (08-25-22 @ 05:35)  SpO2: 99% (08-25-22 @ 05:35) (99% - 100%)  Wt(kg): --    EXAM:  Constitutional:  well preserved, comfortable  LUNGS:  CTA  CVS:  regular rate   Abd:  soft, non-tender; non-distended  Ext:  +L foot bandage  MSK:  joints without swelling  Neuro: non- focal    Labs:                        10.2   7.88  )-----------( 410      ( 24 Aug 2022 06:35 )             32.1     08-24    141  |  104  |  10  ----------------------------<  87  3.5   |  27  |  0.74    Ca    9.2      24 Aug 2022 06:35  Phos  3.5     08-24  Mg     1.60     08-24        WBC Trend:  WBC Count: 7.88 (08-24-22 @ 06:35)  WBC Count: 7.45 (08-23-22 @ 09:32)  WBC Count: 7.59 (08-22-22 @ 05:30)  WBC Count: 10.28 (08-21-22 @ 05:28)      Creatine Trend:  Creatinine, Serum: 0.74 (08-24)  Creatinine, Serum: 0.69 (08-23)  Creatinine, Serum: 0.64 (08-22)  Creatinine, Serum: 0.69 (08-21)      Liver Biochemical Testing Trend:  Alanine Aminotransferase (ALT/SGPT): 10 (08-22)  Aspartate Aminotransferase (AST/SGOT): 19 (08-22-22 @ 05:30)  Bilirubin Total, Serum: 0.3 (08-22)      Trend LDH    MICROBIOLOGY:  Vancomycin Level, Trough: 12.3 (08-23 @ 09:32)    Culture - Abscess with Gram Stain (collected 21 Aug 2022 10:25)  Source: .Abscess foot  Preliminary Report:    Moderate Methicillin Resistant Staphylococcus aureus    Few Escherichia coli ESBL    Few Alpha hemolytic strep "Susceptibilities not performed"    Moderate Actinomyces turicensis "Susceptibilities not performed"  Organism: Methicillin resistant Staphylococcus aureus  Escherichia coli ESBL  Organism: Methicillin resistant Staphylococcus aureus    Sensitivities:      -  Ampicillin/Sulbactam: R <=8/4      -  Cefazolin: R <=4      -  Clindamycin: S <=0.25      -  Daptomycin: S 0.5      -  Erythromycin: S <=0.25      -  Gentamicin: S <=1 Should not be used as monotherapy      -  Linezolid: S 4      -  Oxacillin: R >2      -  Penicillin: R >8      -  Rifampin: S <=1 Should not be used as monotherapy      -  Tetra/Doxy: S <=1      -  Trimethoprim/Sulfamethoxazole: S <=0.5/9.5      -  Vancomycin: S 2      Method Type: PETERSON  Organism: Escherichia coli ESBL    Sensitivities:      -  Amikacin: S <=16      -  Amoxicillin/Clavulanic Acid: S <=8/4      -  Ampicillin: R >16 These ampicillin results predict results for amoxicillin      -  Ampicillin/Sulbactam: R 16/8 Enterobacter, Klebsiella aerogenes, Citrobacter, and Serratia may develop resistance during prolonged therapy (3-4 days)      -  Aztreonam: R 16      -  Cefazolin: R >16 Enterobacter, Klebsiella aerogenes, Citrobacter, and Serratia may develop resistance during prolonged therapy (3-4 days)      -  Cefepime: R 16      -  Ceftriaxone: R >32 Enterobacter, Klebsiella aerogenes, Citrobacter, and Serratia may develop resistance during prolonged therapy      -  Ciprofloxacin: R >2      -  Ertapenem: S <=0.5      -  Gentamicin: S <=2      -  Levofloxacin: R >4      -  Meropenem: S <=1      -  Piperacillin/Tazobactam: R <=8      -  Tobramycin: S <=2      -  Trimethoprim/Sulfamethoxazole: S <=0.5/9.5      Method Type: PETERSON    Culture - Urine (collected 24 Jan 2021 11:25)  Source: .Urine Clean Catch (Midstream)  Final Report:    50,000 - 99,000 CFU/mL Streptococcus agalactiae (Group B) isolated    Group B streptococci are susceptible to ampicillin,    penicillin and cefazolin, but may be resistant to    erythromycin and clindamycin.    Recommendations for intrapartum prophylaxis for Group B    streptococci are penicillin or ampicillin.    COVID-19 PCR: NotDetec (08-25-22 @ 01:30)    C-Reactive Protein, Serum: 86.0 (08-21)    Ferritin, Serum: 898 (08-22)    Sedimentation Rate, Erythrocyte: 90 mm/hr (08-21-22 @ 05:28)

## 2022-08-25 NOTE — PROGRESS NOTE ADULT - ATTENDING COMMENTS
Low concern for residual infection intraop.   Would continue antibiotics for 48hrs post op and then stop.   Vanc trough goal around 10 is fine   Local woundcare.     Will sign off, call back if needed   Discussed with medicine     Carlos Haywood MD   Infectious Disease   Available on TEAMS. After 5PM and on weekends please page fellow on call or call [phone]

## 2022-08-25 NOTE — PROGRESS NOTE ADULT - SUBJECTIVE AND OBJECTIVE BOX
DATE OF SERVICE: 08-25-22 @ 07:05    Patient is a 57y old  Male who presents with a chief complaint of pain/discharge from left small toe amputation site (24 Aug 2022 16:48)      SUBJECTIVE / OVERNIGHT EVENTS:    MEDICATIONS  (STANDING):  amLODIPine   Tablet 5 milliGRAM(s) Oral daily  aspirin  chewable 81 milliGRAM(s) Oral daily  atorvastatin 80 milliGRAM(s) Oral at bedtime  carvedilol 12.5 milliGRAM(s) Oral every 12 hours  dextrose 5%. 1000 milliLiter(s) (50 mL/Hr) IV Continuous <Continuous>  dextrose 5%. 1000 milliLiter(s) (100 mL/Hr) IV Continuous <Continuous>  dextrose 50% Injectable 25 Gram(s) IV Push once  dextrose 50% Injectable 12.5 Gram(s) IV Push once  dextrose 50% Injectable 25 Gram(s) IV Push once  ertapenem  IVPB 1000 milliGRAM(s) IV Intermittent every 24 hours  ferrous    sulfate 325 milliGRAM(s) Oral daily  folic acid 1 milliGRAM(s) Oral daily  furosemide    Tablet 40 milliGRAM(s) Oral daily  gabapentin 100 milliGRAM(s) Oral three times a day  glucagon  Injectable 1 milliGRAM(s) IntraMuscular once  insulin glargine Injectable (LANTUS) 9 Unit(s) SubCutaneous at bedtime  insulin lispro (ADMELOG) corrective regimen sliding scale   SubCutaneous three times a day before meals  insulin lispro (ADMELOG) corrective regimen sliding scale   SubCutaneous at bedtime  insulin lispro Injectable (ADMELOG) 5 Unit(s) SubCutaneous three times a day before meals  levothyroxine 100 MICROGram(s) Oral daily  losartan 25 milliGRAM(s) Oral daily  senna 2 Tablet(s) Oral at bedtime  vancomycin  IVPB 1250 milliGRAM(s) IV Intermittent every 12 hours    MEDICATIONS  (PRN):  acetaminophen     Tablet .. 975 milliGRAM(s) Oral every 8 hours PRN Temp greater or equal to 38C (100.4F), Mild Pain (1 - 3)  dextrose Oral Gel 15 Gram(s) Oral once PRN Blood Glucose LESS THAN 70 milliGRAM(s)/deciliter  traMADol 50 milliGRAM(s) Oral every 6 hours PRN Severe Pain (7 - 10)      Vital Signs Last 24 Hrs  T(C): 36.4 (25 Aug 2022 05:35), Max: 37.1 (24 Aug 2022 12:39)  T(F): 97.6 (25 Aug 2022 05:35), Max: 98.7 (24 Aug 2022 12:39)  HR: 78 (25 Aug 2022 05:35) (70 - 88)  BP: 157/88 (25 Aug 2022 05:35) (147/78 - 159/90)  BP(mean): --  RR: 17 (25 Aug 2022 05:35) (17 - 18)  SpO2: 99% (25 Aug 2022 05:35) (99% - 100%)    Parameters below as of 25 Aug 2022 05:35  Patient On (Oxygen Delivery Method): room air      CAPILLARY BLOOD GLUCOSE      POCT Blood Glucose.: 218 mg/dL (24 Aug 2022 21:08)  POCT Blood Glucose.: 193 mg/dL (24 Aug 2022 17:32)  POCT Blood Glucose.: 161 mg/dL (24 Aug 2022 12:29)  POCT Blood Glucose.: 128 mg/dL (24 Aug 2022 08:34)    I&O's Summary      PHYSICAL EXAM:  GENERAL: NAD, well-developed  HEAD:  Atraumatic, Normocephalic  EYES: EOMI, PERRLA, conjunctiva and sclera clear  NECK: Supple, No JVD  CHEST/LUNG: Clear to auscultation bilaterally; No wheeze  HEART: Regular rate and rhythm; No murmurs, rubs, or gallops  ABDOMEN: Soft, Nontender, Nondistended; Bowel sounds present  EXTREMITIES:  2+ Peripheral Pulses, No clubbing, cyanosis, or edema  PSYCH: AAOx3  NEUROLOGY: non-focal  SKIN: No rashes or lesions    LABS:                        10.2   7.88  )-----------( 410      ( 24 Aug 2022 06:35 )             32.1     08-24    141  |  104  |  10  ----------------------------<  87  3.5   |  27  |  0.74    Ca    9.2      24 Aug 2022 06:35  Phos  3.5     08-24  Mg     1.60     08-24                RADIOLOGY & ADDITIONAL TESTS:    Imaging Personally Reviewed:    Consultant(s) Notes Reviewed:      Care Discussed with Consultants/Other Providers:   DATE OF SERVICE: 08-25-22 @ 07:05    Patient is a 57y old  Male who presents with a chief complaint of pain/discharge from left small toe amputation site (24 Aug 2022 16:48)      SUBJECTIVE / OVERNIGHT EVENTS: No acute events overnight. Pain well controlled with Tylenol prn. Denies any CP, SOB, N/V, changes in urination or BMs, dizziness, headaches.     MEDICATIONS  (STANDING):  amLODIPine   Tablet 5 milliGRAM(s) Oral daily  aspirin  chewable 81 milliGRAM(s) Oral daily  atorvastatin 80 milliGRAM(s) Oral at bedtime  carvedilol 12.5 milliGRAM(s) Oral every 12 hours  dextrose 5%. 1000 milliLiter(s) (50 mL/Hr) IV Continuous <Continuous>  dextrose 5%. 1000 milliLiter(s) (100 mL/Hr) IV Continuous <Continuous>  dextrose 50% Injectable 25 Gram(s) IV Push once  dextrose 50% Injectable 12.5 Gram(s) IV Push once  dextrose 50% Injectable 25 Gram(s) IV Push once  ertapenem  IVPB 1000 milliGRAM(s) IV Intermittent every 24 hours  ferrous    sulfate 325 milliGRAM(s) Oral daily  folic acid 1 milliGRAM(s) Oral daily  furosemide    Tablet 40 milliGRAM(s) Oral daily  gabapentin 100 milliGRAM(s) Oral three times a day  glucagon  Injectable 1 milliGRAM(s) IntraMuscular once  insulin glargine Injectable (LANTUS) 9 Unit(s) SubCutaneous at bedtime  insulin lispro (ADMELOG) corrective regimen sliding scale   SubCutaneous three times a day before meals  insulin lispro (ADMELOG) corrective regimen sliding scale   SubCutaneous at bedtime  insulin lispro Injectable (ADMELOG) 5 Unit(s) SubCutaneous three times a day before meals  levothyroxine 100 MICROGram(s) Oral daily  losartan 25 milliGRAM(s) Oral daily  senna 2 Tablet(s) Oral at bedtime  vancomycin  IVPB 1250 milliGRAM(s) IV Intermittent every 12 hours    MEDICATIONS  (PRN):  acetaminophen     Tablet .. 975 milliGRAM(s) Oral every 8 hours PRN Temp greater or equal to 38C (100.4F), Mild Pain (1 - 3)  dextrose Oral Gel 15 Gram(s) Oral once PRN Blood Glucose LESS THAN 70 milliGRAM(s)/deciliter  traMADol 50 milliGRAM(s) Oral every 6 hours PRN Severe Pain (7 - 10)      Vital Signs Last 24 Hrs  T(C): 36.4 (25 Aug 2022 05:35), Max: 37.1 (24 Aug 2022 12:39)  T(F): 97.6 (25 Aug 2022 05:35), Max: 98.7 (24 Aug 2022 12:39)  HR: 78 (25 Aug 2022 05:35) (70 - 88)  BP: 157/88 (25 Aug 2022 05:35) (147/78 - 159/90)  BP(mean): --  RR: 17 (25 Aug 2022 05:35) (17 - 18)  SpO2: 99% (25 Aug 2022 05:35) (99% - 100%)    Parameters below as of 25 Aug 2022 05:35  Patient On (Oxygen Delivery Method): room air      CAPILLARY BLOOD GLUCOSE      POCT Blood Glucose.: 218 mg/dL (24 Aug 2022 21:08)  POCT Blood Glucose.: 193 mg/dL (24 Aug 2022 17:32)  POCT Blood Glucose.: 161 mg/dL (24 Aug 2022 12:29)  POCT Blood Glucose.: 128 mg/dL (24 Aug 2022 08:34)    I&O's Summary      PHYSICAL EXAM:  GENERAL: NAD, well-developed  HEAD:  Atraumatic, Normocephalic  EYES: EOMI, PERRLA, conjunctiva and sclera clear  NECK: Supple, No JVD  CHEST/LUNG: Clear to auscultation bilaterally; No wheeze  HEART: Regular rate and rhythm; No murmurs, rubs, or gallops  ABDOMEN: Soft, Nontender, Nondistended; Bowel sounds present  EXTREMITIES:  2+ R PT, 1+ AT, Trace R DP, trace to 1+ R PT, AT and trace DP (exam limited on R 2/2 swelling), No clubbing, cyanosis. 2+ edema R foot/ankle. Open wound present at left 5th toe amputation site with probe to bone.   PSYCH: AAOx3  NEUROLOGY: non-focal. Absent proprioception of R 1st toe, intact L. Decreased sensation to light touch up to mid foot on R and up to above the ankle on L.   SKIN: No rashes or lesions    LABS:                        10.2   7.88  )-----------( 410      ( 24 Aug 2022 06:35 )             32.1     08-24    141  |  104  |  10  ----------------------------<  87  3.5   |  27  |  0.74    Ca    9.2      24 Aug 2022 06:35  Phos  3.5     08-24  Mg     1.60     08-24                RADIOLOGY & ADDITIONAL TESTS:    Imaging Personally Reviewed:    Consultant(s) Notes Reviewed:      Care Discussed with Consultants/Other Providers:

## 2022-08-25 NOTE — PROGRESS NOTE ADULT - ASSESSMENT
WORK UP  WBC stable 7, BMP wnl Cr 0.7  ESR 90, CRP 86  X-Ray: Erosive changes about the lateral aspect of the left fourth metatarsal head and proximal phalanx are suspicious for osteomyelitis.   MRI L foot: involvement of 4th MTP, 4th metatarsal head, base of 4th proximal phalanx. No stump osteomyelitis. Reactive osteitis of 3rd metatarsal head.   Abscess Cx: MRSA and ESBL E. coli    s/p Flagyl (8/21), Cefepime (8/21-8/22), Clinda (8/21)  Currently on meropenem  IVPB 1000 every 8 hours (8/23 --- ) and vancomycin  IVPB 1500 every 12 hours (8/21 --- )    DIAGNOSIS and IMPRESSION  57F with IDDM c/b diabetic neuropathy, s/p L 5th toe amputation 3/2022 for ?gangrene, HTN, and CAD s/p CABG 7/2022 who presented with 4 days of pain and discharge from his prior L 5th toe amputation site concerning for osteomyelitis. ID consulted for OM.     #4th MTP Osteomyelitis  #Foot Abscess with MRSA and ESBL E.coli    RECOMMENDATIONS  - c/w Vancomycin dose to 1250mg BID, vanco trough prior to 4th dose   - c/w Ellie to Ertapenem 1G q24  - Planned for OR today with Podiatry at 3pm, final Antibiotic management based on OR procedure      PT TO BE SEEN. PRELIM NOTE  PENDING RECS. PLEASE WAIT FOR FINAL RECS AFTER DISCUSSION WITH ATTENDINGDyana Mckeon DO, PGY-4   ID fellow  [employer] Teams Preferred  After 5pm/weekends call [phone]   WORK UP  WBC stable 7, BMP wnl Cr 0.7  ESR 90, CRP 86  X-Ray: Erosive changes about the lateral aspect of the left fourth metatarsal head and proximal phalanx are suspicious for osteomyelitis.   MRI L foot: involvement of 4th MTP, 4th metatarsal head, base of 4th proximal phalanx. No stump osteomyelitis. Reactive osteitis of 3rd metatarsal head.   Abscess Cx: MRSA and ESBL E. coli    s/p Flagyl (8/21), Cefepime (8/21-8/22), Clinda (8/21)  Currently on meropenem  IVPB 1000 every 8 hours (8/23 --- ) and vancomycin  IVPB 1500 every 12 hours (8/21 --- )    DIAGNOSIS and IMPRESSION  57F with IDDM c/b diabetic neuropathy, s/p L 5th toe amputation 3/2022 for ?gangrene, HTN, and CAD s/p CABG 7/2022 who presented with 4 days of pain and discharge from his prior L 5th toe amputation site concerning for osteomyelitis. ID consulted for OM.     #4th MTP Osteomyelitis  #Foot Abscess with MRSA and ESBL E.coli    RECOMMENDATIONS  - c/w Vancomycin dose to 1250mg BID, vanco trough prior to 4th dose   - c/w Ertapenem 1G q24  - Planned for OR today with Podiatry at 3pm, final Antibiotic management based on OR procedure    Rony Mckeon DO, PGY-4   ID fellow  [employer] Teams Preferred  After 5pm/weekends call [phone]

## 2022-08-25 NOTE — BRIEF OPERATIVE NOTE - COMMENTS
Pt is s/p LEFT foot Partial Fourth Ray Resection   - Low concern for residual bone infection, bone was hard at level of resection and appeared vitalized intra-op  - Low concern for viability, adequate intro-op bleeding.   - Pt will be stable for discharge from Podiatry standpoint pending 48 hours of no grow from Clean bone margin.   - ID recs appreciated.

## 2022-08-25 NOTE — PROGRESS NOTE ADULT - ATTENDING COMMENTS
56yo M with PMH of IDDM with neuropathy, s/p L 5th toe amputation 3/2022 for ?gangrene, HTN, and CAD s/p CABG 7/2022 who presented with 4 days of pain and discharge from the prior L 5th toe amputation site admitted for OM      #Left 4th metatarsal OM - Confirmed on MRI. podiatry following; inflammatory makers elevated. Wound cx polymicrobial growing ESBL ecoli and MRSA. C/w vancomycin and ertapenem. Plan for partial resection today by podiatry. ID to assist with antibiotics management post surgery.  #IDDM – home regimen: Tresiba 18U qhs, Humalog 6-8U qac TID, Ozempic, metformin 500mg po BID. Currently on lantus 17 and premeal 5TID. FS acceptable. Monitor for now.   #CAD - c/w home regimen of asa, statin, coreg, losartan,   #hypothyroid - TSH WNL, c/w home synthroid dose    Plan discussed with patient and HS

## 2022-08-25 NOTE — PROGRESS NOTE ADULT - PROBLEM SELECTOR PLAN 2
Per pt, on 27 units long-acting insulin and 7 units short acting. Per outpt pharmacy, pt rx Tresiba 18 units, Humalog 6-8 units with meals, and Ozempic. On Metformin 500 mg BID. 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 On gabapentin 100 mg TID. HgA1c 7.7%. Hypoglycemic to 63 in AM after Lantus 22, pre-meal 7.   - cont. 17 units long-acting, 5 units pre-meal bolus along with ISS and reassess based on how much ISS is needed  - continue gabapentin 100 mg TID  [] decrease Lantus by 1/2 tonight before surgery. No pre-meal in the morning since will be NPO. Per pt, on 27 units long-acting insulin and 7 units short acting. Per outpt pharmacy, pt rx Tresiba 18 units, Humalog 6-8 units with meals, and Ozempic. On Metformin 500 mg BID. 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 On gabapentin 100 mg TID. HgA1c 7.7%. Hypoglycemic to 63 in AM after Lantus 22, pre-meal 7.   - cont. 17 units long-acting, 5 units pre-meal bolus along with ISS and reassess based on how much ISS is needed  - continue gabapentin 100 mg TID  [] return lantus to 17 units tonight after surgery

## 2022-08-25 NOTE — PROGRESS NOTE ADULT - PROBLEM SELECTOR PLAN 1
Subacute pain and discharge from amputation site of L small toe. Probe to bone on exam on podiatric exam. XR c/f osteomyelitis of left fourth metatarsal head and proximal phalanx. CRP 86, ESR 80. WBC 10.28.   --- Wound culture final: MRSA and ESBL E. coli. Few alpha hemolytic streph and moderate actinomyces turicensis seen but no susceptibilities performed.   --- MRI L foot: involvement of 4th MTP, 4th metatarsal head, base of 4th proximal phalanx. No stump osteomyelitis. Reactive osteitis of 3rd metatarsal head.   - Day 2 of vanc and meropenem based on wound culture sensitivities. Vancomycin dosed by trough (goal 15-20).  - Podiatry: scheduled for partial 4th resection of left foot. Possible LINH-PVR vs vascular surgery consult for diminished pulses, pending Podiatry fecs.  - ID consulted to determine antibiotic coverage for after surgery/discharge given limited PO options based on sensitivities Subacute pain and discharge from amputation site of L small toe. Probe to bone on exam on podiatric exam. XR c/f osteomyelitis of left fourth metatarsal head and proximal phalanx. CRP 86, ESR 80. WBC 10.28.   --- Wound culture final: MRSA and ESBL E. coli. Few alpha hemolytic streph and moderate actinomyces.  --- MRI L foot: involvement of 4th MTP, 4th metatarsal head, base of 4th proximal phalanx. No stump osteomyelitis. Reactive osteitis of 3rd metatarsal head.   - Day 3 of appropriate antibiotic coverage. Vanc dosed by trough (1250 mg BID). Meropenem switched to Ertapenem given negative for pseudomonas. ID will give final abx recs after surgery on 8/25.   - Podiatry: scheduled for partial 4th resection of left foot. Possible LINH-PVR vs vascular surgery consult for diminished pulses, pending Podiatry fecs.  - ID consulted to determine antibiotic coverage for after surgery/discharge given limited PO options based on sensitivities Subacute pain and discharge from amputation site of L small toe. Probe to bone on exam on podiatric exam. XR c/f osteomyelitis of left fourth metatarsal head and proximal phalanx. CRP 86, ESR 80. WBC 10.28.   --- Wound culture final: MRSA and ESBL E. coli. Few alpha hemolytic streph and moderate actinomyces.  --- MRI L foot: involvement of 4th MTP, 4th metatarsal head, base of 4th proximal phalanx. No stump osteomyelitis. Reactive osteitis of 3rd metatarsal head.   - Day 3 of appropriate antibiotic coverage. Vanc dosed by trough (1250 mg BID). Meropenem switched to Ertapenem given negative for pseudomonas. ID will give final abx recs after surgery on 8/25.   - Podiatry: scheduled for partial 4th resection of left foot 8/25 @ 3pm. Possible LINH-PVR vs vascular surgery consult for diminished pulses, pending Podiatry recs.

## 2022-08-25 NOTE — PROGRESS NOTE ADULT - ASSESSMENT
58 y/o man with MHX of IDDM c/b diabetic neuropathy, s/p L 5th toe amputation 3/2022 for ?gangrene, HTN, and CAD s/p CABG 7/2022 who presented with 4 days of pain and discharge from his prior L 5th toe amputation site c/f osteomyelitis. Podiatry following.       Patient has a history of CABG in July 2022. He is having no active chest pain, able to walk 4 blocks and at least 1 flight of stairs without chest pain. Not volume overloaded EKG shows new t-wave abnormalities, unclear baseline since CABG. Revised cardiac risk index (RCRI) Class III with history of positive exercise stress test and pre-operative treatment with insulin. This equates to a 10.1% 30-day mortality risk. This is a low risk procedure, localized to the foot with local anesthesia and light IV sedation. Patient is medically optimized for surgery at this time.      58 y/o man with MHX of IDDM c/b diabetic neuropathy, s/p L 5th toe amputation 3/2022 for ?gangrene, HTN, and CAD s/p CABG 7/2022 who presented with 4 days of pain and discharge from his prior L 5th toe amputation site c/f osteomyelitis. Podiatry following, amputation scheduled for 8/25.

## 2022-08-25 NOTE — PROGRESS NOTE ADULT - ASSESSMENT
Plan:   To OR today at 1500 with Dr. Key for LEFT foot Partial Fourth Ray Resection.   CXR on sunrise.  EKG on sunrise.  Medical clearance since 8/24 and documented in chart.  Consent signed and in chart.  Procedure was explained to patient in detail. All alternatives, risks and complications were discussed. All questions answered. Plan:   To OR today at 1300 with Dr. Key for LEFT foot Partial Fourth Ray Resection.   CXR on sunrise.  EKG on sunrise.  Medical clearance since 8/24 and documented in chart.  Consent signed and in chart.  Procedure was explained to patient in detail. All alternatives, risks and complications were discussed. All questions answered.

## 2022-08-25 NOTE — BRIEF OPERATIVE NOTE - SPECIMENS
Path: 1) Dirty bone and necrotic tissue. 2) Clean bone margin off 4th met shaft. Micro 1) Clean bone margin off 4th met shaft

## 2022-08-25 NOTE — PROGRESS NOTE ADULT - PROBLEM SELECTOR PLAN 3
Diet: Diabetic diet. NPO at midnight before surgery  DVT ppx: lovenox  Dispo: admit to medicine    Patient has asked that the team not call his wife. If she is present physically when things are being discussed, that is okay. Diet: Diabetic diet. NPO at midnight before surgery  DVT ppx: lovenox  Dispo: Admitted to medicine    PT evaluation pending recs from Podiatry after surgery (weight-bearing limitations vs. other). Diet: Diabetic diet. NPO at midnight before surgery  DVT ppx: held for sx  Dispo: Admitted to medicine    PT evaluation pending recs from Podiatry after surgery (weight-bearing limitations vs. other).

## 2022-08-26 LAB
ANION GAP SERPL CALC-SCNC: 10 MMOL/L — SIGNIFICANT CHANGE UP (ref 7–14)
BUN SERPL-MCNC: 12 MG/DL — SIGNIFICANT CHANGE UP (ref 7–23)
CALCIUM SERPL-MCNC: 9.4 MG/DL — SIGNIFICANT CHANGE UP (ref 8.4–10.5)
CHLORIDE SERPL-SCNC: 100 MMOL/L — SIGNIFICANT CHANGE UP (ref 98–107)
CO2 SERPL-SCNC: 26 MMOL/L — SIGNIFICANT CHANGE UP (ref 22–31)
CREAT SERPL-MCNC: 0.7 MG/DL — SIGNIFICANT CHANGE UP (ref 0.5–1.3)
CULTURE RESULTS: SIGNIFICANT CHANGE UP
EGFR: 107 ML/MIN/1.73M2 — SIGNIFICANT CHANGE UP
GLUCOSE BLDC GLUCOMTR-MCNC: 155 MG/DL — HIGH (ref 70–99)
GLUCOSE BLDC GLUCOMTR-MCNC: 195 MG/DL — HIGH (ref 70–99)
GLUCOSE BLDC GLUCOMTR-MCNC: 208 MG/DL — HIGH (ref 70–99)
GLUCOSE BLDC GLUCOMTR-MCNC: 209 MG/DL — HIGH (ref 70–99)
GLUCOSE SERPL-MCNC: 192 MG/DL — HIGH (ref 70–99)
HCT VFR BLD CALC: 33.3 % — LOW (ref 39–50)
HGB BLD-MCNC: 10.7 G/DL — LOW (ref 13–17)
MAGNESIUM SERPL-MCNC: 1.7 MG/DL — SIGNIFICANT CHANGE UP (ref 1.6–2.6)
MCHC RBC-ENTMCNC: 29.2 PG — SIGNIFICANT CHANGE UP (ref 27–34)
MCHC RBC-ENTMCNC: 32.1 GM/DL — SIGNIFICANT CHANGE UP (ref 32–36)
MCV RBC AUTO: 91 FL — SIGNIFICANT CHANGE UP (ref 80–100)
NIGHT BLUE STAIN TISS: SIGNIFICANT CHANGE UP
NRBC # BLD: 0 /100 WBCS — SIGNIFICANT CHANGE UP (ref 0–0)
NRBC # FLD: 0 K/UL — SIGNIFICANT CHANGE UP (ref 0–0)
ORGANISM # SPEC MICROSCOPIC CNT: SIGNIFICANT CHANGE UP
PHOSPHATE SERPL-MCNC: 3.8 MG/DL — SIGNIFICANT CHANGE UP (ref 2.5–4.5)
PLATELET # BLD AUTO: 411 K/UL — HIGH (ref 150–400)
POTASSIUM SERPL-MCNC: 4.4 MMOL/L — SIGNIFICANT CHANGE UP (ref 3.5–5.3)
POTASSIUM SERPL-SCNC: 4.4 MMOL/L — SIGNIFICANT CHANGE UP (ref 3.5–5.3)
RBC # BLD: 3.66 M/UL — LOW (ref 4.2–5.8)
RBC # FLD: 13.2 % — SIGNIFICANT CHANGE UP (ref 10.3–14.5)
SODIUM SERPL-SCNC: 136 MMOL/L — SIGNIFICANT CHANGE UP (ref 135–145)
SPECIMEN SOURCE: SIGNIFICANT CHANGE UP
SPECIMEN SOURCE: SIGNIFICANT CHANGE UP
VANCOMYCIN TROUGH SERPL-MCNC: 15.3 UG/ML — SIGNIFICANT CHANGE UP (ref 10–20)
WBC # BLD: 10.61 K/UL — HIGH (ref 3.8–10.5)
WBC # FLD AUTO: 10.61 K/UL — HIGH (ref 3.8–10.5)

## 2022-08-26 PROCEDURE — 99233 SBSQ HOSP IP/OBS HIGH 50: CPT

## 2022-08-26 RX ORDER — MORPHINE SULFATE 50 MG/1
1 CAPSULE, EXTENDED RELEASE ORAL ONCE
Refills: 0 | Status: DISCONTINUED | OUTPATIENT
Start: 2022-08-26 | End: 2022-08-26

## 2022-08-26 RX ORDER — ACETAMINOPHEN 500 MG
975 TABLET ORAL EVERY 8 HOURS
Refills: 0 | Status: COMPLETED | OUTPATIENT
Start: 2022-08-26 | End: 2022-08-28

## 2022-08-26 RX ORDER — OXYCODONE HYDROCHLORIDE 5 MG/1
5 TABLET ORAL EVERY 6 HOURS
Refills: 0 | Status: DISCONTINUED | OUTPATIENT
Start: 2022-08-26 | End: 2022-08-28

## 2022-08-26 RX ORDER — OXYCODONE HYDROCHLORIDE 5 MG/1
5 TABLET ORAL EVERY 6 HOURS
Refills: 0 | Status: DISCONTINUED | OUTPATIENT
Start: 2022-08-26 | End: 2022-08-26

## 2022-08-26 RX ADMIN — MORPHINE SULFATE 1 MILLIGRAM(S): 50 CAPSULE, EXTENDED RELEASE ORAL at 01:22

## 2022-08-26 RX ADMIN — GABAPENTIN 100 MILLIGRAM(S): 400 CAPSULE ORAL at 13:01

## 2022-08-26 RX ADMIN — MORPHINE SULFATE 1 MILLIGRAM(S): 50 CAPSULE, EXTENDED RELEASE ORAL at 01:37

## 2022-08-26 RX ADMIN — CARVEDILOL PHOSPHATE 12.5 MILLIGRAM(S): 80 CAPSULE, EXTENDED RELEASE ORAL at 17:15

## 2022-08-26 RX ADMIN — Medication 975 MILLIGRAM(S): at 13:01

## 2022-08-26 RX ADMIN — GABAPENTIN 100 MILLIGRAM(S): 400 CAPSULE ORAL at 21:24

## 2022-08-26 RX ADMIN — HEPARIN SODIUM 5000 UNIT(S): 5000 INJECTION INTRAVENOUS; SUBCUTANEOUS at 21:23

## 2022-08-26 RX ADMIN — Medication 166.67 MILLIGRAM(S): at 17:16

## 2022-08-26 RX ADMIN — Medication 81 MILLIGRAM(S): at 13:01

## 2022-08-26 RX ADMIN — ATORVASTATIN CALCIUM 80 MILLIGRAM(S): 80 TABLET, FILM COATED ORAL at 21:24

## 2022-08-26 RX ADMIN — CARVEDILOL PHOSPHATE 12.5 MILLIGRAM(S): 80 CAPSULE, EXTENDED RELEASE ORAL at 05:18

## 2022-08-26 RX ADMIN — Medication 325 MILLIGRAM(S): at 12:54

## 2022-08-26 RX ADMIN — OXYCODONE HYDROCHLORIDE 5 MILLIGRAM(S): 5 TABLET ORAL at 00:17

## 2022-08-26 RX ADMIN — Medication 1 MILLIGRAM(S): at 12:54

## 2022-08-26 RX ADMIN — LOSARTAN POTASSIUM 25 MILLIGRAM(S): 100 TABLET, FILM COATED ORAL at 05:18

## 2022-08-26 RX ADMIN — Medication 5 UNIT(S): at 09:09

## 2022-08-26 RX ADMIN — Medication 2: at 17:56

## 2022-08-26 RX ADMIN — TRAMADOL HYDROCHLORIDE 50 MILLIGRAM(S): 50 TABLET ORAL at 05:19

## 2022-08-26 RX ADMIN — TRAMADOL HYDROCHLORIDE 50 MILLIGRAM(S): 50 TABLET ORAL at 06:19

## 2022-08-26 RX ADMIN — Medication 1: at 09:08

## 2022-08-26 RX ADMIN — Medication 2: at 12:54

## 2022-08-26 RX ADMIN — AMLODIPINE BESYLATE 5 MILLIGRAM(S): 2.5 TABLET ORAL at 05:18

## 2022-08-26 RX ADMIN — Medication 5 UNIT(S): at 12:55

## 2022-08-26 RX ADMIN — Medication 975 MILLIGRAM(S): at 21:21

## 2022-08-26 RX ADMIN — HEPARIN SODIUM 5000 UNIT(S): 5000 INJECTION INTRAVENOUS; SUBCUTANEOUS at 13:00

## 2022-08-26 RX ADMIN — INSULIN GLARGINE 17 UNIT(S): 100 INJECTION, SOLUTION SUBCUTANEOUS at 21:37

## 2022-08-26 RX ADMIN — Medication 100 MICROGRAM(S): at 05:18

## 2022-08-26 RX ADMIN — GABAPENTIN 100 MILLIGRAM(S): 400 CAPSULE ORAL at 05:18

## 2022-08-26 RX ADMIN — Medication 40 MILLIGRAM(S): at 05:18

## 2022-08-26 RX ADMIN — ERTAPENEM SODIUM 120 MILLIGRAM(S): 1 INJECTION, POWDER, LYOPHILIZED, FOR SOLUTION INTRAMUSCULAR; INTRAVENOUS at 21:22

## 2022-08-26 RX ADMIN — Medication 5 UNIT(S): at 17:57

## 2022-08-26 RX ADMIN — Medication 166.67 MILLIGRAM(S): at 06:26

## 2022-08-26 NOTE — PROGRESS NOTE ADULT - PROBLEM SELECTOR PLAN 1
Subacute pain and discharge from amputation site of L small toe. Probe to bone on exam on podiatric exam. XR c/f osteomyelitis of left fourth metatarsal head and proximal phalanx. CRP 86, ESR 80. WBC 10.28.   --- Wound culture final: MRSA and ESBL E. coli. Few alpha hemolytic streph and moderate actinomyces.  --- MRI L foot: involvement of 4th MTP, 4th metatarsal head, base of 4th proximal phalanx. No stump osteomyelitis. Reactive osteitis of 3rd metatarsal head.   - Day 3 of appropriate antibiotic coverage. Vanc dosed by trough (1250 mg BID). Meropenem switched to Ertapenem given negative for pseudomonas. ID will give final abx recs after surgery on 8/25.   - Podiatry: scheduled for partial 4th resection of left foot 8/25 @ 3pm. Possible LINH-PVR vs vascular surgery consult for diminished pulses, pending Podiatry recs. Subacute pain and discharge from amputation site of L small toe. Probe to bone on exam on podiatric exam. XR c/f osteomyelitis of left fourth metatarsal head and proximal phalanx. CRP 86, ESR 80. WBC 10.28.   --- Wound culture final: MRSA and ESBL E. coli. Few alpha hemolytic streph and moderate actinomyces.  --- MRI L foot: involvement of 4th MTP, 4th metatarsal head, base of 4th proximal phalanx. No stump osteomyelitis. Reactive osteitis of 3rd metatarsal head.   -- Podiatry: partial 4th resection of left foot 8/25.   - Day 4 of appropriate antibiotic coverage. Vanc dosed by trough (1250 mg BID). Ertapenem.   - Per Podiatry, patient will be cleared for d/c if surgical culture is negative after 48 hours.   - Per ID, patient should remain on abx for 48 hours after surgery and then is cleared for d/c without abx if surgical culture is negative.   - determine if LINH-PVR vs vascular surgery consult for diminished pulses, pending Podiatry recs.

## 2022-08-26 NOTE — PROGRESS NOTE ADULT - ASSESSMENT
58 y/o man with MHX of IDDM c/b diabetic neuropathy, s/p L 5th toe amputation 3/2022 for ?gangrene, HTN, and CAD s/p CABG 7/2022 who presented with 4 days of pain and discharge from his prior L 5th toe amputation site c/f osteomyelitis. Podiatry following, amputation scheduled for 8/25.          56 y/o man with MHX of IDDM c/b diabetic neuropathy, s/p L 5th toe amputation 3/2022 for ?gangrene, HTN, and CAD s/p CABG 7/2022 who presented with 4 days of pain and discharge from his prior L 5th toe amputation site c/f osteomyelitis. Podiatry following, s/p amputation 8/25.

## 2022-08-26 NOTE — PHYSICAL THERAPY INITIAL EVALUATION ADULT - ACTIVE RANGE OF MOTION EXAMINATION, REHAB EVAL
except left ankle df/pf 0-5 degrees/bilateral upper extremity Active ROM was WFL (within functional limits)/bilateral  lower extremity Active ROM was WFL (within functional limits)

## 2022-08-26 NOTE — PROGRESS NOTE ADULT - SUBJECTIVE AND OBJECTIVE BOX
DATE OF SERVICE: 08-26-22 @ 07:01    Patient is a 57y old  Male who presents with a chief complaint of pain/discharge from left small toe amputation site (25 Aug 2022 08:04)      SUBJECTIVE / OVERNIGHT EVENTS:    MEDICATIONS  (STANDING):  amLODIPine   Tablet 5 milliGRAM(s) Oral daily  aspirin  chewable 81 milliGRAM(s) Oral daily  atorvastatin 80 milliGRAM(s) Oral at bedtime  carvedilol 12.5 milliGRAM(s) Oral every 12 hours  dextrose 5%. 1000 milliLiter(s) (100 mL/Hr) IV Continuous <Continuous>  dextrose 5%. 1000 milliLiter(s) (50 mL/Hr) IV Continuous <Continuous>  dextrose 50% Injectable 25 Gram(s) IV Push once  dextrose 50% Injectable 12.5 Gram(s) IV Push once  dextrose 50% Injectable 25 Gram(s) IV Push once  ertapenem  IVPB 1000 milliGRAM(s) IV Intermittent every 24 hours  ferrous    sulfate 325 milliGRAM(s) Oral daily  folic acid 1 milliGRAM(s) Oral daily  furosemide    Tablet 40 milliGRAM(s) Oral daily  gabapentin 100 milliGRAM(s) Oral three times a day  glucagon  Injectable 1 milliGRAM(s) IntraMuscular once  heparin   Injectable 5000 Unit(s) SubCutaneous every 8 hours  insulin glargine Injectable (LANTUS) 17 Unit(s) SubCutaneous at bedtime  insulin lispro (ADMELOG) corrective regimen sliding scale   SubCutaneous three times a day before meals  insulin lispro (ADMELOG) corrective regimen sliding scale   SubCutaneous at bedtime  insulin lispro Injectable (ADMELOG) 5 Unit(s) SubCutaneous three times a day before meals  levothyroxine 100 MICROGram(s) Oral daily  losartan 25 milliGRAM(s) Oral daily  senna 2 Tablet(s) Oral at bedtime  vancomycin  IVPB 1250 milliGRAM(s) IV Intermittent every 12 hours    MEDICATIONS  (PRN):  acetaminophen     Tablet .. 975 milliGRAM(s) Oral every 8 hours PRN Temp greater or equal to 38C (100.4F), Mild Pain (1 - 3)  acetaminophen     Tablet .. 650 milliGRAM(s) Oral every 6 hours PRN Mild Pain (1 - 3)  dextrose Oral Gel 15 Gram(s) Oral once PRN Blood Glucose LESS THAN 70 milliGRAM(s)/deciliter  traMADol 50 milliGRAM(s) Oral every 6 hours PRN Severe Pain (7 - 10)      Vital Signs Last 24 Hrs  T(C): 36.8 (26 Aug 2022 05:11), Max: 36.8 (26 Aug 2022 05:11)  T(F): 98.3 (26 Aug 2022 05:11), Max: 98.3 (26 Aug 2022 05:11)  HR: 87 (26 Aug 2022 05:11) (59 - 87)  BP: 111/70 (26 Aug 2022 05:11) (111/70 - 158/91)  BP(mean): 88 (25 Aug 2022 16:15) (84 - 106)  RR: 18 (26 Aug 2022 05:11) (12 - 19)  SpO2: 97% (26 Aug 2022 05:11) (96% - 100%)    Parameters below as of 26 Aug 2022 05:11  Patient On (Oxygen Delivery Method): room air      CAPILLARY BLOOD GLUCOSE      POCT Blood Glucose.: 221 mg/dL (25 Aug 2022 23:05)  POCT Blood Glucose.: 204 mg/dL (25 Aug 2022 21:56)  POCT Blood Glucose.: 182 mg/dL (25 Aug 2022 19:30)  POCT Blood Glucose.: 123 mg/dL (25 Aug 2022 17:51)  POCT Blood Glucose.: 129 mg/dL (25 Aug 2022 15:09)  POCT Blood Glucose.: 161 mg/dL (25 Aug 2022 12:02)  POCT Blood Glucose.: 148 mg/dL (25 Aug 2022 08:29)    I&O's Summary      PHYSICAL EXAM:  GENERAL: NAD, well-developed  HEAD:  Atraumatic, Normocephalic  EYES: EOMI, PERRLA, conjunctiva and sclera clear  NECK: Supple, No JVD  CHEST/LUNG: Clear to auscultation bilaterally; No wheeze  HEART: Regular rate and rhythm; No murmurs, rubs, or gallops  ABDOMEN: Soft, Nontender, Nondistended; Bowel sounds present  EXTREMITIES:  2+ Peripheral Pulses, No clubbing, cyanosis, or edema  PSYCH: AAOx3  NEUROLOGY: non-focal  SKIN: No rashes or lesions    LABS:                        10.7   10.61 )-----------( 411      ( 26 Aug 2022 05:25 )             33.3     08-26    136  |  100  |  12  ----------------------------<  192<H>  4.4   |  26  |  0.70    Ca    9.4      26 Aug 2022 05:25  Phos  3.8     08-26  Mg     1.70     08-26      PT/INR - ( 25 Aug 2022 06:30 )   PT: 15.0 sec;   INR: 1.29 ratio         PTT - ( 25 Aug 2022 06:30 )  PTT:32.0 sec          RADIOLOGY & ADDITIONAL TESTS:    Imaging Personally Reviewed:    Consultant(s) Notes Reviewed:      Care Discussed with Consultants/Other Providers:   DATE OF SERVICE: 08-26-22 @ 07:01    Patient is a 57y old  Male who presents with a chief complaint of pain/discharge from left small toe amputation site (25 Aug 2022 08:04)      SUBJECTIVE / OVERNIGHT EVENTS: Patient required oxycodone, morphine, and tramadol overnight for pain. This morning patient was still in pain and noted a throbbing at the area that only feels okay if he dangles his foot off the bed. Otherwise he denies any CP, SOB, N/V, HA, dizziness.     MEDICATIONS  (STANDING):  amLODIPine   Tablet 5 milliGRAM(s) Oral daily  aspirin  chewable 81 milliGRAM(s) Oral daily  atorvastatin 80 milliGRAM(s) Oral at bedtime  carvedilol 12.5 milliGRAM(s) Oral every 12 hours  dextrose 5%. 1000 milliLiter(s) (100 mL/Hr) IV Continuous <Continuous>  dextrose 5%. 1000 milliLiter(s) (50 mL/Hr) IV Continuous <Continuous>  dextrose 50% Injectable 25 Gram(s) IV Push once  dextrose 50% Injectable 12.5 Gram(s) IV Push once  dextrose 50% Injectable 25 Gram(s) IV Push once  ertapenem  IVPB 1000 milliGRAM(s) IV Intermittent every 24 hours  ferrous    sulfate 325 milliGRAM(s) Oral daily  folic acid 1 milliGRAM(s) Oral daily  furosemide    Tablet 40 milliGRAM(s) Oral daily  gabapentin 100 milliGRAM(s) Oral three times a day  glucagon  Injectable 1 milliGRAM(s) IntraMuscular once  heparin   Injectable 5000 Unit(s) SubCutaneous every 8 hours  insulin glargine Injectable (LANTUS) 17 Unit(s) SubCutaneous at bedtime  insulin lispro (ADMELOG) corrective regimen sliding scale   SubCutaneous three times a day before meals  insulin lispro (ADMELOG) corrective regimen sliding scale   SubCutaneous at bedtime  insulin lispro Injectable (ADMELOG) 5 Unit(s) SubCutaneous three times a day before meals  levothyroxine 100 MICROGram(s) Oral daily  losartan 25 milliGRAM(s) Oral daily  senna 2 Tablet(s) Oral at bedtime  vancomycin  IVPB 1250 milliGRAM(s) IV Intermittent every 12 hours    MEDICATIONS  (PRN):  acetaminophen     Tablet .. 975 milliGRAM(s) Oral every 8 hours PRN Temp greater or equal to 38C (100.4F), Mild Pain (1 - 3)  acetaminophen     Tablet .. 650 milliGRAM(s) Oral every 6 hours PRN Mild Pain (1 - 3)  dextrose Oral Gel 15 Gram(s) Oral once PRN Blood Glucose LESS THAN 70 milliGRAM(s)/deciliter  traMADol 50 milliGRAM(s) Oral every 6 hours PRN Severe Pain (7 - 10)      Vital Signs Last 24 Hrs  T(C): 36.8 (26 Aug 2022 05:11), Max: 36.8 (26 Aug 2022 05:11)  T(F): 98.3 (26 Aug 2022 05:11), Max: 98.3 (26 Aug 2022 05:11)  HR: 87 (26 Aug 2022 05:11) (59 - 87)  BP: 111/70 (26 Aug 2022 05:11) (111/70 - 158/91)  BP(mean): 88 (25 Aug 2022 16:15) (84 - 106)  RR: 18 (26 Aug 2022 05:11) (12 - 19)  SpO2: 97% (26 Aug 2022 05:11) (96% - 100%)    Parameters below as of 26 Aug 2022 05:11  Patient On (Oxygen Delivery Method): room air      CAPILLARY BLOOD GLUCOSE      POCT Blood Glucose.: 221 mg/dL (25 Aug 2022 23:05)  POCT Blood Glucose.: 204 mg/dL (25 Aug 2022 21:56)  POCT Blood Glucose.: 182 mg/dL (25 Aug 2022 19:30)  POCT Blood Glucose.: 123 mg/dL (25 Aug 2022 17:51)  POCT Blood Glucose.: 129 mg/dL (25 Aug 2022 15:09)  POCT Blood Glucose.: 161 mg/dL (25 Aug 2022 12:02)  POCT Blood Glucose.: 148 mg/dL (25 Aug 2022 08:29)    I&O's Summary      PHYSICAL EXAM:  GENERAL: NAD, well-developed  HEAD:  Atraumatic, Normocephalic  EYES: EOMI, PERRLA, conjunctiva and sclera clear  NECK: Supple, No JVD  CHEST/LUNG: Clear to auscultation bilaterally; No wheeze  HEART: Regular rate and rhythm; No murmurs, rubs, or gallops  ABDOMEN: Soft, Nontender, Nondistended; Bowel sounds present  EXTREMITIES:  1+ PT R, trace AT/DP R, No clubbing, cyanosis, or edema. Surgical dressing in place on left foot.   PSYCH: AAOx3  NEUROLOGY: non-focal  SKIN: No rashes or lesions    LABS:                        10.7   10.61 )-----------( 411      ( 26 Aug 2022 05:25 )             33.3     08-26    136  |  100  |  12  ----------------------------<  192<H>  4.4   |  26  |  0.70    Ca    9.4      26 Aug 2022 05:25  Phos  3.8     08-26  Mg     1.70     08-26      PT/INR - ( 25 Aug 2022 06:30 )   PT: 15.0 sec;   INR: 1.29 ratio         PTT - ( 25 Aug 2022 06:30 )  PTT:32.0 sec          RADIOLOGY & ADDITIONAL TESTS:    Imaging Personally Reviewed:    Consultant(s) Notes Reviewed:      Care Discussed with Consultants/Other Providers:

## 2022-08-26 NOTE — PROGRESS NOTE ADULT - ATTENDING COMMENTS
56yo M with PMH of IDDM with neuropathy, s/p L 5th toe amputation 3/2022 for ?gangrene, HTN, and CAD s/p CABG 7/2022 who presented with 4 days of pain and discharge from the prior L 5th toe amputation site admitted for OM      #Left 4th metatarsal OM -  Wound cx polymicrobial growing ESBL ecoli and MRSA. C/w vancomycin and ertapenem. s/p LEFT foot Partial Fourth Ray Resection, closed on8/25/22; POD1, Tylenol PRN for mild pain, oxycodone for severe pain. Pt will be stable for discharge from Podiatry standpoint pending 48 hours of no grow from Clean bone margin. Possible discharge over weekend.   #IDDM – home regimen: Tresiba 18U qhs, Humalog 6-8U qac TID, Ozempic, metformin 500mg po BID. Currently on lantus 17 and premeal 5TID. FS acceptable. Monitor for now.   #CAD - c/w home regimen of asa, statin, coreg, losartan,   #hypothyroid - TSH WNL, c/w home synthroid dose    Plan discussed with patient and HS.

## 2022-08-26 NOTE — PHYSICAL THERAPY INITIAL EVALUATION ADULT - MANUAL MUSCLE TESTING RESULTS, REHAB EVAL
bilateral Upper Extremity and Lower Extremity 4+/5 except left ankle df/pf 3-/5 within available range

## 2022-08-26 NOTE — PROGRESS NOTE ADULT - PROBLEM SELECTOR PLAN 2
Per pt, on 27 units long-acting insulin and 7 units short acting. Per outpt pharmacy, pt rx Tresiba 18 units, Humalog 6-8 units with meals, and Ozempic. On Metformin 500 mg BID. 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 On gabapentin 100 mg TID. HgA1c 7.7%. Hypoglycemic to 63 in AM after Lantus 22, pre-meal 7.   - cont. 17 units long-acting, 5 units pre-meal bolus along with ISS and reassess based on how much ISS is needed  - continue gabapentin 100 mg TID  [] return lantus to 17 units tonight after surgery Per pt, on 27 units long-acting insulin and 7 units short acting. Per outpt pharmacy, pt rx Tresiba 18 units, Humalog 6-8 units with meals, and Ozempic. On Metformin 500 mg BID. 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 On gabapentin 100 mg TID. HgA1c 7.7%. Hypoglycemic to 63 in AM after Lantus 22, pre-meal 7.   - cont. 17 units long-acting, 5 units pre-meal bolus along with ISS and reassess based on how much ISS is needed  - continue gabapentin 100 mg TID

## 2022-08-26 NOTE — PHYSICAL THERAPY INITIAL EVALUATION ADULT - GENERAL OBSERVATIONS, REHAB EVAL
Pt found in bed; patient agreeable to PT; +left foot with dressing/ace wrap intact. Pt agreeable to PT.

## 2022-08-26 NOTE — PHYSICAL THERAPY INITIAL EVALUATION ADULT - PERTINENT HX OF CURRENT PROBLEM, REHAB EVAL
57 year old man with MHX of IDDM c/b diabetic neuropathy (L 5th toe amputation March 2022), CAD s/p CABG (July 2022) , HTN who presents to the ED for 4 days of pain and discharge from his left 5th toe amputation site. Patient noticed swelling, pain, and discharge from the site of his amputation. Pt s/p above named surgery.

## 2022-08-26 NOTE — PROGRESS NOTE ADULT - PROBLEM SELECTOR PLAN 3
Diet: Diabetic diet. NPO at midnight before surgery  DVT ppx: held for sx  Dispo: Admitted to medicine    PT evaluation pending recs from Podiatry after surgery (weight-bearing limitations vs. other). Diet: Diabetic diet.   DVT ppx: held for sx  Dispo: Admitted to medicine    Pending PT recs

## 2022-08-26 NOTE — PROGRESS NOTE ADULT - ASSESSMENT
57y Male s/p LEFT foot Partial Fourth Ray Resection, closed (DOS 8/25/22) POD1  - Patient seen and evaluated  - Afebrile T(F): 98.3, WBC 10.61  - Low concern for residual bone infection, bone was hard at level of resection and appeared vitalized intra-op  - Low concern for viability, adequate intro-op bleeding.   - Sutures intact, skin well coapted, no hematoma expressed, mild heidy-wound erythema within normal post -op course, no pus nor drainage noted, skin flap appears warm, viable with normal cap refill time.   - Clean bone culture: pending  - Clean bone and dirty bone Pathology: pending  - Pt will be stable for discharge from Podiatry standpoint pending 48 hours of no grow from Clean bone margin.   - Discussed with attending.

## 2022-08-26 NOTE — PROGRESS NOTE ADULT - SUBJECTIVE AND OBJECTIVE BOX
Patient is a 57y old  Male who presents with a chief complaint of pain/discharge from left small toe amputation site (26 Aug 2022 07:01)       INTERVAL HPI/OVERNIGHT EVENTS:  Patient seen and evaluated at bedside.  Pt is resting comfortable in NAD. Denies N/V/F/C.    Allergies    No Known Allergies    Intolerances        Vital Signs Last 24 Hrs  T(C): 36.8 (26 Aug 2022 05:11), Max: 36.8 (26 Aug 2022 05:11)  T(F): 98.3 (26 Aug 2022 05:11), Max: 98.3 (26 Aug 2022 05:11)  HR: 87 (26 Aug 2022 05:11) (59 - 87)  BP: 111/70 (26 Aug 2022 05:11) (111/70 - 154/90)  BP(mean): 88 (25 Aug 2022 16:15) (84 - 92)  RR: 18 (26 Aug 2022 05:11) (12 - 19)  SpO2: 97% (26 Aug 2022 05:11) (96% - 100%)    Parameters below as of 26 Aug 2022 05:11  Patient On (Oxygen Delivery Method): room air        LABS:                        10.7   10.61 )-----------( 411      ( 26 Aug 2022 05:25 )             33.3     08-26    136  |  100  |  12  ----------------------------<  192<H>  4.4   |  26  |  0.70    Ca    9.4      26 Aug 2022 05:25  Phos  3.8     08-26  Mg     1.70     08-26      PT/INR - ( 25 Aug 2022 06:30 )   PT: 15.0 sec;   INR: 1.29 ratio         PTT - ( 25 Aug 2022 06:30 )  PTT:32.0 sec    CAPILLARY BLOOD GLUCOSE      POCT Blood Glucose.: 195 mg/dL (26 Aug 2022 08:59)  POCT Blood Glucose.: 221 mg/dL (25 Aug 2022 23:05)  POCT Blood Glucose.: 204 mg/dL (25 Aug 2022 21:56)  POCT Blood Glucose.: 182 mg/dL (25 Aug 2022 19:30)  POCT Blood Glucose.: 123 mg/dL (25 Aug 2022 17:51)  POCT Blood Glucose.: 129 mg/dL (25 Aug 2022 15:09)      Lower Extremity Physical Exam:    Vascular: DP/PT 2/4 B/L, CFT <3sec x 10, Temp gradient warm to warm of left foot, warm to cool of Right foot.    Neurology: Epicritic sensation decreased to level of forefoot, B/L  Musculoskeletal/Ortho:   Skin:  - s/p LEFT foot Partial Fourth Ray Resection, closed (DOS 8/25/22)   - Sutures intact, skin well coapted, no hematoma expressed, mild heidy-wound erythema within normal post -op course, no pus nor drainage noted, skin flap appears warm, viable with normal cap refill time.     RADIOLOGY & ADDITIONAL TESTS:

## 2022-08-27 VITALS
HEART RATE: 78 BPM | OXYGEN SATURATION: 99 % | SYSTOLIC BLOOD PRESSURE: 139 MMHG | TEMPERATURE: 98 F | DIASTOLIC BLOOD PRESSURE: 72 MMHG | RESPIRATION RATE: 18 BRPM

## 2022-08-27 LAB
ANION GAP SERPL CALC-SCNC: 10 MMOL/L — SIGNIFICANT CHANGE UP (ref 7–14)
BASOPHILS # BLD AUTO: 0.05 K/UL — SIGNIFICANT CHANGE UP (ref 0–0.2)
BASOPHILS NFR BLD AUTO: 0.6 % — SIGNIFICANT CHANGE UP (ref 0–2)
BUN SERPL-MCNC: 15 MG/DL — SIGNIFICANT CHANGE UP (ref 7–23)
CALCIUM SERPL-MCNC: 9 MG/DL — SIGNIFICANT CHANGE UP (ref 8.4–10.5)
CHLORIDE SERPL-SCNC: 101 MMOL/L — SIGNIFICANT CHANGE UP (ref 98–107)
CO2 SERPL-SCNC: 25 MMOL/L — SIGNIFICANT CHANGE UP (ref 22–31)
CREAT SERPL-MCNC: 0.75 MG/DL — SIGNIFICANT CHANGE UP (ref 0.5–1.3)
EGFR: 105 ML/MIN/1.73M2 — SIGNIFICANT CHANGE UP
EOSINOPHIL # BLD AUTO: 0.27 K/UL — SIGNIFICANT CHANGE UP (ref 0–0.5)
EOSINOPHIL NFR BLD AUTO: 3 % — SIGNIFICANT CHANGE UP (ref 0–6)
GLUCOSE BLDC GLUCOMTR-MCNC: 141 MG/DL — HIGH (ref 70–99)
GLUCOSE BLDC GLUCOMTR-MCNC: 166 MG/DL — HIGH (ref 70–99)
GLUCOSE BLDC GLUCOMTR-MCNC: 172 MG/DL — HIGH (ref 70–99)
GLUCOSE BLDC GLUCOMTR-MCNC: 266 MG/DL — HIGH (ref 70–99)
GLUCOSE BLDC GLUCOMTR-MCNC: 277 MG/DL — HIGH (ref 70–99)
GLUCOSE SERPL-MCNC: 175 MG/DL — HIGH (ref 70–99)
HCT VFR BLD CALC: 31 % — LOW (ref 39–50)
HGB BLD-MCNC: 10.2 G/DL — LOW (ref 13–17)
IANC: 4.87 K/UL — SIGNIFICANT CHANGE UP (ref 1.8–7.4)
IMM GRANULOCYTES NFR BLD AUTO: 0.5 % — SIGNIFICANT CHANGE UP (ref 0–1.5)
LYMPHOCYTES # BLD AUTO: 2.54 K/UL — SIGNIFICANT CHANGE UP (ref 1–3.3)
LYMPHOCYTES # BLD AUTO: 28.7 % — SIGNIFICANT CHANGE UP (ref 13–44)
MAGNESIUM SERPL-MCNC: 1.6 MG/DL — SIGNIFICANT CHANGE UP (ref 1.6–2.6)
MCHC RBC-ENTMCNC: 29.7 PG — SIGNIFICANT CHANGE UP (ref 27–34)
MCHC RBC-ENTMCNC: 32.9 GM/DL — SIGNIFICANT CHANGE UP (ref 32–36)
MCV RBC AUTO: 90.1 FL — SIGNIFICANT CHANGE UP (ref 80–100)
MONOCYTES # BLD AUTO: 1.09 K/UL — HIGH (ref 0–0.9)
MONOCYTES NFR BLD AUTO: 12.3 % — SIGNIFICANT CHANGE UP (ref 2–14)
NEUTROPHILS # BLD AUTO: 4.87 K/UL — SIGNIFICANT CHANGE UP (ref 1.8–7.4)
NEUTROPHILS NFR BLD AUTO: 54.9 % — SIGNIFICANT CHANGE UP (ref 43–77)
NRBC # BLD: 0 /100 WBCS — SIGNIFICANT CHANGE UP (ref 0–0)
NRBC # FLD: 0 K/UL — SIGNIFICANT CHANGE UP (ref 0–0)
PHOSPHATE SERPL-MCNC: 4 MG/DL — SIGNIFICANT CHANGE UP (ref 2.5–4.5)
PLATELET # BLD AUTO: 400 K/UL — SIGNIFICANT CHANGE UP (ref 150–400)
POTASSIUM SERPL-MCNC: 3.9 MMOL/L — SIGNIFICANT CHANGE UP (ref 3.5–5.3)
POTASSIUM SERPL-SCNC: 3.9 MMOL/L — SIGNIFICANT CHANGE UP (ref 3.5–5.3)
RBC # BLD: 3.44 M/UL — LOW (ref 4.2–5.8)
RBC # FLD: 13.1 % — SIGNIFICANT CHANGE UP (ref 10.3–14.5)
SODIUM SERPL-SCNC: 136 MMOL/L — SIGNIFICANT CHANGE UP (ref 135–145)
VANCOMYCIN TROUGH SERPL-MCNC: 13.1 UG/ML — SIGNIFICANT CHANGE UP (ref 10–20)
WBC # BLD: 8.86 K/UL — SIGNIFICANT CHANGE UP (ref 3.8–10.5)
WBC # FLD AUTO: 8.86 K/UL — SIGNIFICANT CHANGE UP (ref 3.8–10.5)

## 2022-08-27 PROCEDURE — 99233 SBSQ HOSP IP/OBS HIGH 50: CPT

## 2022-08-27 RX ORDER — NIFEDIPINE 30 MG
1 TABLET, EXTENDED RELEASE 24 HR ORAL
Qty: 0 | Refills: 0 | DISCHARGE

## 2022-08-27 RX ORDER — POTASSIUM CHLORIDE 20 MEQ
1 PACKET (EA) ORAL
Qty: 0 | Refills: 0 | DISCHARGE

## 2022-08-27 RX ORDER — MAGNESIUM OXIDE 400 MG ORAL TABLET 241.3 MG
0 TABLET ORAL
Qty: 0 | Refills: 0 | DISCHARGE

## 2022-08-27 RX ADMIN — Medication 975 MILLIGRAM(S): at 14:48

## 2022-08-27 RX ADMIN — GABAPENTIN 100 MILLIGRAM(S): 400 CAPSULE ORAL at 14:47

## 2022-08-27 RX ADMIN — HEPARIN SODIUM 5000 UNIT(S): 5000 INJECTION INTRAVENOUS; SUBCUTANEOUS at 21:50

## 2022-08-27 RX ADMIN — Medication 1: at 12:47

## 2022-08-27 RX ADMIN — OXYCODONE HYDROCHLORIDE 5 MILLIGRAM(S): 5 TABLET ORAL at 12:41

## 2022-08-27 RX ADMIN — ATORVASTATIN CALCIUM 80 MILLIGRAM(S): 80 TABLET, FILM COATED ORAL at 21:50

## 2022-08-27 RX ADMIN — Medication 975 MILLIGRAM(S): at 21:50

## 2022-08-27 RX ADMIN — Medication 166.67 MILLIGRAM(S): at 19:27

## 2022-08-27 RX ADMIN — ERTAPENEM SODIUM 120 MILLIGRAM(S): 1 INJECTION, POWDER, LYOPHILIZED, FOR SOLUTION INTRAMUSCULAR; INTRAVENOUS at 21:51

## 2022-08-27 RX ADMIN — INSULIN GLARGINE 17 UNIT(S): 100 INJECTION, SOLUTION SUBCUTANEOUS at 22:00

## 2022-08-27 RX ADMIN — SENNA PLUS 2 TABLET(S): 8.6 TABLET ORAL at 21:51

## 2022-08-27 RX ADMIN — Medication 975 MILLIGRAM(S): at 05:52

## 2022-08-27 RX ADMIN — Medication 325 MILLIGRAM(S): at 11:36

## 2022-08-27 RX ADMIN — LOSARTAN POTASSIUM 25 MILLIGRAM(S): 100 TABLET, FILM COATED ORAL at 05:53

## 2022-08-27 RX ADMIN — Medication 1 MILLIGRAM(S): at 11:36

## 2022-08-27 RX ADMIN — GABAPENTIN 100 MILLIGRAM(S): 400 CAPSULE ORAL at 21:50

## 2022-08-27 RX ADMIN — CARVEDILOL PHOSPHATE 12.5 MILLIGRAM(S): 80 CAPSULE, EXTENDED RELEASE ORAL at 05:53

## 2022-08-27 RX ADMIN — Medication 166.67 MILLIGRAM(S): at 05:53

## 2022-08-27 RX ADMIN — OXYCODONE HYDROCHLORIDE 5 MILLIGRAM(S): 5 TABLET ORAL at 13:41

## 2022-08-27 RX ADMIN — HEPARIN SODIUM 5000 UNIT(S): 5000 INJECTION INTRAVENOUS; SUBCUTANEOUS at 05:52

## 2022-08-27 RX ADMIN — Medication 5 UNIT(S): at 18:00

## 2022-08-27 RX ADMIN — AMLODIPINE BESYLATE 5 MILLIGRAM(S): 2.5 TABLET ORAL at 05:53

## 2022-08-27 RX ADMIN — Medication 5 UNIT(S): at 08:51

## 2022-08-27 RX ADMIN — Medication 1: at 18:00

## 2022-08-27 RX ADMIN — Medication 100 MICROGRAM(S): at 05:52

## 2022-08-27 RX ADMIN — Medication 81 MILLIGRAM(S): at 11:36

## 2022-08-27 RX ADMIN — CARVEDILOL PHOSPHATE 12.5 MILLIGRAM(S): 80 CAPSULE, EXTENDED RELEASE ORAL at 18:01

## 2022-08-27 RX ADMIN — HEPARIN SODIUM 5000 UNIT(S): 5000 INJECTION INTRAVENOUS; SUBCUTANEOUS at 14:47

## 2022-08-27 RX ADMIN — Medication 1: at 22:00

## 2022-08-27 RX ADMIN — Medication 40 MILLIGRAM(S): at 05:52

## 2022-08-27 RX ADMIN — Medication 5 UNIT(S): at 12:48

## 2022-08-27 RX ADMIN — GABAPENTIN 100 MILLIGRAM(S): 400 CAPSULE ORAL at 05:52

## 2022-08-27 NOTE — PROGRESS NOTE ADULT - SUBJECTIVE AND OBJECTIVE BOX
Walter Kraus MD PGY-3  Internal Medicine Resident    CHIEF COMPLAINT: Patient is a 57y old  Male who presents with a chief complaint of pain/discharge from left small toe amputation site (26 Aug 2022 12:30)      INTERVAL HPI/OVERNIGHT EVENTS: VIANEY ON, hypertensive BP 140s-150s/70s-80, otherwise VSS grossly wnl    MEDICATIONS (STANDING):  acetaminophen     Tablet .. 975 milliGRAM(s) Oral every 8 hours  amLODIPine   Tablet 5 milliGRAM(s) Oral daily  aspirin  chewable 81 milliGRAM(s) Oral daily  atorvastatin 80 milliGRAM(s) Oral at bedtime  carvedilol 12.5 milliGRAM(s) Oral every 12 hours  dextrose 5%. 1000 milliLiter(s) IV Continuous <Continuous>  dextrose 5%. 1000 milliLiter(s) IV Continuous <Continuous>  dextrose 50% Injectable 25 Gram(s) IV Push once  dextrose 50% Injectable 12.5 Gram(s) IV Push once  dextrose 50% Injectable 25 Gram(s) IV Push once  ertapenem  IVPB 1000 milliGRAM(s) IV Intermittent every 24 hours  ferrous    sulfate 325 milliGRAM(s) Oral daily  folic acid 1 milliGRAM(s) Oral daily  furosemide    Tablet 40 milliGRAM(s) Oral daily  gabapentin 100 milliGRAM(s) Oral three times a day  glucagon  Injectable 1 milliGRAM(s) IntraMuscular once  heparin   Injectable 5000 Unit(s) SubCutaneous every 8 hours  insulin glargine Injectable (LANTUS) 17 Unit(s) SubCutaneous at bedtime  insulin lispro (ADMELOG) corrective regimen sliding scale   SubCutaneous three times a day before meals  insulin lispro (ADMELOG) corrective regimen sliding scale   SubCutaneous at bedtime  insulin lispro Injectable (ADMELOG) 5 Unit(s) SubCutaneous three times a day before meals  levothyroxine 100 MICROGram(s) Oral daily  losartan 25 milliGRAM(s) Oral daily  senna 2 Tablet(s) Oral at bedtime  vancomycin  IVPB 1250 milliGRAM(s) IV Intermittent every 12 hours    MEDICATIONS  (PRN):  dextrose Oral Gel 15 Gram(s) Oral once PRN  oxyCODONE    IR 5 milliGRAM(s) Oral every 6 hours PRN      REVIEW OF SYSTEMS:      T(F): 98.6 (08-27-22 @ 05:49), Max: 98.6 (08-27-22 @ 05:49)  HR: 85 (08-27-22 @ 05:49) (74 - 85)  BP: 153/80 (08-27-22 @ 05:49) (129/70 - 153/80)  RR: 17 (08-27-22 @ 05:49) (17 - 18)  SpO2: 98% (08-27-22 @ 05:49) (98% - 100%)  Wt(kg): --  CAPILLARY BLOOD GLUCOSE      POCT Blood Glucose.: 155 mg/dL (26 Aug 2022 21:35)  POCT Blood Glucose.: 208 mg/dL (26 Aug 2022 17:52)  POCT Blood Glucose.: 209 mg/dL (26 Aug 2022 12:37)  POCT Blood Glucose.: 195 mg/dL (26 Aug 2022 08:59)    I&O's Summary      PHYSICAL EXAM:      LABS:                        10.2   8.86  )-----------( 400      ( 27 Aug 2022 07:10 )             31.0     08-27    136  |  101  |  15  ----------------------------<  175<H>  3.9   |  25  |  0.75    Ca    9.0      27 Aug 2022 07:10  Phos  4.0     08-27  Mg     1.60     08-27              RADIOLOGY & ADDITIONAL TESTS:       Walter Kraus MD PGY-3  Internal Medicine Resident    CHIEF COMPLAINT: Patient is a 57y old  Male who presents with a chief complaint of pain/discharge from left small toe amputation site (26 Aug 2022 12:30)      INTERVAL HPI/OVERNIGHT EVENTS: VIANEY ON, hypertensive BP 140s-150s/70s-80, otherwise VSS grossly wnl. Pt reports feeling generally well however reports persistent L upper chest burning sensation which developed after he was pushing a heavy object at work, unclear provoking factor, w/o radiation to other area including jaw/arm/back, w/o concurrent palpitations. Otherwise reports improvement in L foot pain s/p surgery, otherwise denies complaints.     MEDICATIONS (STANDING):  acetaminophen     Tablet .. 975 milliGRAM(s) Oral every 8 hours  amLODIPine   Tablet 5 milliGRAM(s) Oral daily  aspirin  chewable 81 milliGRAM(s) Oral daily  atorvastatin 80 milliGRAM(s) Oral at bedtime  carvedilol 12.5 milliGRAM(s) Oral every 12 hours  dextrose 5%. 1000 milliLiter(s) IV Continuous <Continuous>  dextrose 5%. 1000 milliLiter(s) IV Continuous <Continuous>  dextrose 50% Injectable 25 Gram(s) IV Push once  dextrose 50% Injectable 12.5 Gram(s) IV Push once  dextrose 50% Injectable 25 Gram(s) IV Push once  ertapenem  IVPB 1000 milliGRAM(s) IV Intermittent every 24 hours  ferrous    sulfate 325 milliGRAM(s) Oral daily  folic acid 1 milliGRAM(s) Oral daily  furosemide    Tablet 40 milliGRAM(s) Oral daily  gabapentin 100 milliGRAM(s) Oral three times a day  glucagon  Injectable 1 milliGRAM(s) IntraMuscular once  heparin   Injectable 5000 Unit(s) SubCutaneous every 8 hours  insulin glargine Injectable (LANTUS) 17 Unit(s) SubCutaneous at bedtime  insulin lispro (ADMELOG) corrective regimen sliding scale   SubCutaneous three times a day before meals  insulin lispro (ADMELOG) corrective regimen sliding scale   SubCutaneous at bedtime  insulin lispro Injectable (ADMELOG) 5 Unit(s) SubCutaneous three times a day before meals  levothyroxine 100 MICROGram(s) Oral daily  losartan 25 milliGRAM(s) Oral daily  senna 2 Tablet(s) Oral at bedtime  vancomycin  IVPB 1250 milliGRAM(s) IV Intermittent every 12 hours    MEDICATIONS  (PRN):  dextrose Oral Gel 15 Gram(s) Oral once PRN  oxyCODONE    IR 5 milliGRAM(s) Oral every 6 hours PRN      REVIEW OF SYSTEMS:  CONSTITUTIONAL: No fever or chills  EYES: No visual disturbances or eye pain  ENMT: No sinus or throat pain  RESPIRATORY: No shortness of breath or cough  CARDIOVASCULAR: No dizziness +burning sensation of chest as per HPI   GASTROINTESTINAL: No abdominal or epigastric pain. No diarrhea or constipation. No melena or hematochezia.   GENITOURINARY: No dysuria or hematuria  NEUROLOGICAL: No headaches, loss of strength or numbness  MUSCULOSKELETAL: +L foot pain improved s/p surgery as per HPI       T(F): 98.6 (08-27-22 @ 05:49), Max: 98.6 (08-27-22 @ 05:49)  HR: 85 (08-27-22 @ 05:49) (74 - 85)  BP: 153/80 (08-27-22 @ 05:49) (129/70 - 153/80)  RR: 17 (08-27-22 @ 05:49) (17 - 18)  SpO2: 98% (08-27-22 @ 05:49) (98% - 100%)  Wt(kg): --  CAPILLARY BLOOD GLUCOSE      POCT Blood Glucose.: 155 mg/dL (26 Aug 2022 21:35)  POCT Blood Glucose.: 208 mg/dL (26 Aug 2022 17:52)  POCT Blood Glucose.: 209 mg/dL (26 Aug 2022 12:37)  POCT Blood Glucose.: 195 mg/dL (26 Aug 2022 08:59)    I&O's Summary      PHYSICAL EXAM:  GENERAL: Obese-appearing, NAD, well-groomed, pleasant to interview  HEAD: Atraumatic, Normocephalic  EYES: PERRL, conjunctiva and sclera clear  ENMT: No tonsillar erythema, exudates, or enlargement; Moist mucous membranes  NECK: Supple  NERVOUS SYSTEM: Alert & Oriented X3, Good concentration; Motor Strength 5/5 B/L upper and lower extremities, moving toes on L foot s/p surgery   CHEST/LUNG: Clear to auscultation bilaterally; No rales, rhonchi, wheezing, or rubs  HEART: Regular rate and rhythm; No murmurs, rubs, or gallops +no chest wall tenderness  ABDOMEN: Soft, Nontender, Nondistended; Bowel sounds present. No guarding, rebound tenderness, or rigidity.  EXTREMITIES: 2+ Peripheral Pulses, No edema +L foot w/ overlying dressing s/p surgery w/o overt purulence/bleed saturating dressing       LABS:                        10.2   8.86  )-----------( 400      ( 27 Aug 2022 07:10 )             31.0     08-27    136  |  101  |  15  ----------------------------<  175<H>  3.9   |  25  |  0.75    Ca    9.0      27 Aug 2022 07:10  Phos  4.0     08-27  Mg     1.60     08-27              RADIOLOGY & ADDITIONAL TESTS:       Walter Kraus MD PGY-3  Internal Medicine Resident    CHIEF COMPLAINT: Patient is a 57y old  Male who presents with a chief complaint of pain/discharge from left small toe amputation site (26 Aug 2022 12:30)      INTERVAL HPI/OVERNIGHT EVENTS: VIANEY ON, hypertensive BP 140s-150s/70s-80, otherwise VSS grossly wnl. Pt reports feeling generally well however reports persistent L upper chest burning sensation which developed after he was pushing a heavy object at work, unclear provoking factor, w/o radiation to other area including jaw/arm/back, w/o concurrent palpitations, refusing EKG at this time for further eval. Otherwise reports improvement in L foot pain s/p surgery, otherwise denies complaints.     MEDICATIONS (STANDING):  acetaminophen     Tablet .. 975 milliGRAM(s) Oral every 8 hours  amLODIPine   Tablet 5 milliGRAM(s) Oral daily  aspirin  chewable 81 milliGRAM(s) Oral daily  atorvastatin 80 milliGRAM(s) Oral at bedtime  carvedilol 12.5 milliGRAM(s) Oral every 12 hours  dextrose 5%. 1000 milliLiter(s) IV Continuous <Continuous>  dextrose 5%. 1000 milliLiter(s) IV Continuous <Continuous>  dextrose 50% Injectable 25 Gram(s) IV Push once  dextrose 50% Injectable 12.5 Gram(s) IV Push once  dextrose 50% Injectable 25 Gram(s) IV Push once  ertapenem  IVPB 1000 milliGRAM(s) IV Intermittent every 24 hours  ferrous    sulfate 325 milliGRAM(s) Oral daily  folic acid 1 milliGRAM(s) Oral daily  furosemide    Tablet 40 milliGRAM(s) Oral daily  gabapentin 100 milliGRAM(s) Oral three times a day  glucagon  Injectable 1 milliGRAM(s) IntraMuscular once  heparin   Injectable 5000 Unit(s) SubCutaneous every 8 hours  insulin glargine Injectable (LANTUS) 17 Unit(s) SubCutaneous at bedtime  insulin lispro (ADMELOG) corrective regimen sliding scale   SubCutaneous three times a day before meals  insulin lispro (ADMELOG) corrective regimen sliding scale   SubCutaneous at bedtime  insulin lispro Injectable (ADMELOG) 5 Unit(s) SubCutaneous three times a day before meals  levothyroxine 100 MICROGram(s) Oral daily  losartan 25 milliGRAM(s) Oral daily  senna 2 Tablet(s) Oral at bedtime  vancomycin  IVPB 1250 milliGRAM(s) IV Intermittent every 12 hours    MEDICATIONS  (PRN):  dextrose Oral Gel 15 Gram(s) Oral once PRN  oxyCODONE    IR 5 milliGRAM(s) Oral every 6 hours PRN      REVIEW OF SYSTEMS:  CONSTITUTIONAL: No fever or chills  EYES: No visual disturbances or eye pain  ENMT: No sinus or throat pain  RESPIRATORY: No shortness of breath or cough  CARDIOVASCULAR: No dizziness +burning sensation of chest as per HPI   GASTROINTESTINAL: No abdominal or epigastric pain. No diarrhea or constipation. No melena or hematochezia.   GENITOURINARY: No dysuria or hematuria  NEUROLOGICAL: No headaches, loss of strength or numbness  MUSCULOSKELETAL: +L foot pain improved s/p surgery as per HPI       T(F): 98.6 (08-27-22 @ 05:49), Max: 98.6 (08-27-22 @ 05:49)  HR: 85 (08-27-22 @ 05:49) (74 - 85)  BP: 153/80 (08-27-22 @ 05:49) (129/70 - 153/80)  RR: 17 (08-27-22 @ 05:49) (17 - 18)  SpO2: 98% (08-27-22 @ 05:49) (98% - 100%)  Wt(kg): --  CAPILLARY BLOOD GLUCOSE      POCT Blood Glucose.: 155 mg/dL (26 Aug 2022 21:35)  POCT Blood Glucose.: 208 mg/dL (26 Aug 2022 17:52)  POCT Blood Glucose.: 209 mg/dL (26 Aug 2022 12:37)  POCT Blood Glucose.: 195 mg/dL (26 Aug 2022 08:59)    I&O's Summary      PHYSICAL EXAM:  GENERAL: Obese-appearing, NAD, well-groomed, pleasant to interview  HEAD: Atraumatic, Normocephalic  EYES: PERRL, conjunctiva and sclera clear  ENMT: No tonsillar erythema, exudates, or enlargement; Moist mucous membranes  NECK: Supple  NERVOUS SYSTEM: Alert & Oriented X3, Good concentration; Motor Strength 5/5 B/L upper and lower extremities, moving toes on L foot s/p surgery   CHEST/LUNG: Clear to auscultation bilaterally; No rales, rhonchi, wheezing, or rubs  HEART: Regular rate and rhythm; No murmurs, rubs, or gallops +no chest wall tenderness  ABDOMEN: Soft, Nontender, Nondistended; Bowel sounds present. No guarding, rebound tenderness, or rigidity.  EXTREMITIES: 2+ Peripheral Pulses, No edema +L foot w/ overlying dressing s/p surgery w/o overt purulence/bleed saturating dressing       LABS:                        10.2   8.86  )-----------( 400      ( 27 Aug 2022 07:10 )             31.0     08-27    136  |  101  |  15  ----------------------------<  175<H>  3.9   |  25  |  0.75    Ca    9.0      27 Aug 2022 07:10  Phos  4.0     08-27  Mg     1.60     08-27              RADIOLOGY & ADDITIONAL TESTS:       Walter Kraus MD PGY-3  Internal Medicine Resident    CHIEF COMPLAINT: Patient is a 57y old  Male who presents with a chief complaint of pain/discharge from left small toe amputation site (26 Aug 2022 12:30)      INTERVAL HPI/OVERNIGHT EVENTS: VIANEY ON, hypertensive BP 140s-150s/70s-80, otherwise VSS grossly wnl. Pt reports feeling generally well however reports persistent L upper chest burning sensation x 2 weeks (before admission) which developed after he was pushing a heavy object at work, unclear provoking factor, w/o radiation to other area including jaw/arm/back, w/o concurrent palpitations, refusing EKG at this time for further eval, states he had and EKG during admission. Otherwise reports improvement in L foot pain s/p surgery, otherwise denies complaints.     MEDICATIONS (STANDING):  acetaminophen     Tablet .. 975 milliGRAM(s) Oral every 8 hours  amLODIPine   Tablet 5 milliGRAM(s) Oral daily  aspirin  chewable 81 milliGRAM(s) Oral daily  atorvastatin 80 milliGRAM(s) Oral at bedtime  carvedilol 12.5 milliGRAM(s) Oral every 12 hours  dextrose 5%. 1000 milliLiter(s) IV Continuous <Continuous>  dextrose 5%. 1000 milliLiter(s) IV Continuous <Continuous>  dextrose 50% Injectable 25 Gram(s) IV Push once  dextrose 50% Injectable 12.5 Gram(s) IV Push once  dextrose 50% Injectable 25 Gram(s) IV Push once  ertapenem  IVPB 1000 milliGRAM(s) IV Intermittent every 24 hours  ferrous    sulfate 325 milliGRAM(s) Oral daily  folic acid 1 milliGRAM(s) Oral daily  furosemide    Tablet 40 milliGRAM(s) Oral daily  gabapentin 100 milliGRAM(s) Oral three times a day  glucagon  Injectable 1 milliGRAM(s) IntraMuscular once  heparin   Injectable 5000 Unit(s) SubCutaneous every 8 hours  insulin glargine Injectable (LANTUS) 17 Unit(s) SubCutaneous at bedtime  insulin lispro (ADMELOG) corrective regimen sliding scale   SubCutaneous three times a day before meals  insulin lispro (ADMELOG) corrective regimen sliding scale   SubCutaneous at bedtime  insulin lispro Injectable (ADMELOG) 5 Unit(s) SubCutaneous three times a day before meals  levothyroxine 100 MICROGram(s) Oral daily  losartan 25 milliGRAM(s) Oral daily  senna 2 Tablet(s) Oral at bedtime  vancomycin  IVPB 1250 milliGRAM(s) IV Intermittent every 12 hours    MEDICATIONS  (PRN):  dextrose Oral Gel 15 Gram(s) Oral once PRN  oxyCODONE    IR 5 milliGRAM(s) Oral every 6 hours PRN      REVIEW OF SYSTEMS:  CONSTITUTIONAL: No fever or chills  EYES: No visual disturbances or eye pain  ENMT: No sinus or throat pain  RESPIRATORY: No shortness of breath or cough  CARDIOVASCULAR: No dizziness +burning sensation of chest as per HPI   GASTROINTESTINAL: No abdominal or epigastric pain. No diarrhea or constipation. No melena or hematochezia.   GENITOURINARY: No dysuria or hematuria  NEUROLOGICAL: No headaches, loss of strength or numbness  MUSCULOSKELETAL: +L foot pain improved s/p surgery as per HPI       T(F): 98.6 (08-27-22 @ 05:49), Max: 98.6 (08-27-22 @ 05:49)  HR: 85 (08-27-22 @ 05:49) (74 - 85)  BP: 153/80 (08-27-22 @ 05:49) (129/70 - 153/80)  RR: 17 (08-27-22 @ 05:49) (17 - 18)  SpO2: 98% (08-27-22 @ 05:49) (98% - 100%)  Wt(kg): --  CAPILLARY BLOOD GLUCOSE      POCT Blood Glucose.: 155 mg/dL (26 Aug 2022 21:35)  POCT Blood Glucose.: 208 mg/dL (26 Aug 2022 17:52)  POCT Blood Glucose.: 209 mg/dL (26 Aug 2022 12:37)  POCT Blood Glucose.: 195 mg/dL (26 Aug 2022 08:59)    I&O's Summary      PHYSICAL EXAM:  GENERAL: Obese-appearing, NAD, well-groomed, pleasant to interview  HEAD: Atraumatic, Normocephalic  EYES: PERRL, conjunctiva and sclera clear  ENMT: No tonsillar erythema, exudates, or enlargement; Moist mucous membranes  NECK: Supple  NERVOUS SYSTEM: Alert & Oriented X3, Good concentration; Motor Strength 5/5 B/L upper and lower extremities, moving toes on L foot s/p surgery   CHEST/LUNG: Clear to auscultation bilaterally; No rales, rhonchi, wheezing, or rubs  HEART: Regular rate and rhythm; No murmurs, rubs, or gallops +no chest wall tenderness  ABDOMEN: Soft, Nontender, Nondistended; Bowel sounds present. No guarding, rebound tenderness, or rigidity.  EXTREMITIES: 2+ Peripheral Pulses, No edema +L foot w/ overlying dressing s/p surgery w/o overt purulence/bleed saturating dressing       LABS:                        10.2   8.86  )-----------( 400      ( 27 Aug 2022 07:10 )             31.0     08-27    136  |  101  |  15  ----------------------------<  175<H>  3.9   |  25  |  0.75    Ca    9.0      27 Aug 2022 07:10  Phos  4.0     08-27  Mg     1.60     08-27              RADIOLOGY & ADDITIONAL TESTS:

## 2022-08-27 NOTE — PROGRESS NOTE ADULT - ASSESSMENT
57M PMH IDDM c/b neuropathy and s/p L 5th toe amputation, HTN, CAD s/p CABG (7/2022) p/w L foot pain found to have radiographic e/o OM now POD 2 s/p partial 4th ray resection w/ pods

## 2022-08-27 NOTE — PROGRESS NOTE ADULT - SUBJECTIVE AND OBJECTIVE BOX
Podiatry pager #: [phone] ([place])/ [phone] ([facility])    Patient is a 57y old  Male who presents with a chief complaint of pain/discharge from left small toe amputation site (27 Aug 2022 08:02)       INTERVAL HPI/OVERNIGHT EVENTS:  Patient seen and evaluated at bedside.  Pt is resting comfortable in NAD. Denies N/V/F/C.     Allergies    No Known Allergies    Intolerances        Vital Signs Last 24 Hrs  T(C): 37 (27 Aug 2022 05:49), Max: 37 (27 Aug 2022 05:49)  T(F): 98.6 (27 Aug 2022 05:49), Max: 98.6 (27 Aug 2022 05:49)  HR: 85 (27 Aug 2022 05:49) (74 - 85)  BP: 153/80 (27 Aug 2022 05:49) (129/70 - 153/80)  BP(mean): --  RR: 17 (27 Aug 2022 05:49) (17 - 18)  SpO2: 98% (27 Aug 2022 05:49) (98% - 100%)    Parameters below as of 27 Aug 2022 05:49  Patient On (Oxygen Delivery Method): room air        LABS:                        10.2   8.86  )-----------( 400      ( 27 Aug 2022 07:10 )             31.0     08-27    136  |  101  |  15  ----------------------------<  175<H>  3.9   |  25  |  0.75    Ca    9.0      27 Aug 2022 07:10  Phos  4.0     08-27  Mg     1.60     08-27          CAPILLARY BLOOD GLUCOSE      POCT Blood Glucose.: 141 mg/dL (27 Aug 2022 08:47)  POCT Blood Glucose.: 155 mg/dL (26 Aug 2022 21:35)  POCT Blood Glucose.: 208 mg/dL (26 Aug 2022 17:52)  POCT Blood Glucose.: 209 mg/dL (26 Aug 2022 12:37)      Lower Extremity Physical Exam:  ascular: DP/PT 2/4 B/L, CFT <3sec x 10, Temp gradient warm to warm of left foot, warm to cool of Right foot.    Neurology: Epicritic sensation decreased to level of forefoot, B/L  Musculoskeletal/Ortho:   Skin:  - s/p LEFT foot Partial Fourth Ray Resection, closed (DOS 8/25/22)   - Sutures intact, skin well coapted, no hematoma expressed, mild heidy-wound erythema within normal post -op course, no pus nor drainage noted, skin flap appears warm, viable with normal cap refill time.   RADIOLOGY & ADDITIONAL TESTS:

## 2022-08-27 NOTE — PROGRESS NOTE ADULT - PROBLEM SELECTOR PLAN 2
Per pt, on 27 units long-acting insulin and 7 units short acting. Per outpt pharmacy, pt rx Tresiba 18 units, Humalog 6-8 units with meals, and Ozempic. On Metformin 500 mg BID. C/b neuropathy L>R requiring L toe amputation in March 2022. On gabapentin 100 mg TID. HgA1c 7.7%  - cont. 17 units long-acting, 5 units pre-meal bolus along with ISS and reassess based on how much ISS is needed  - continue gabapentin 100 mg TID

## 2022-08-27 NOTE — PROGRESS NOTE ADULT - ATTENDING COMMENTS
58yo M with PMH of IDDM with neuropathy, s/p L 5th toe amputation 3/2022 for ?gangrene, HTN, and CAD s/p CABG 7/2022 who presented with 4 days of pain and discharge from the prior L 5th toe amputation site admitted for OM      #Left 4th metatarsal OM -  Wound cx polymicrobial growing ESBL ecoli and MRSA. C/w vancomycin and ertapenem. s/p LEFT foot Partial Fourth Ray Resection, closed on8/25/22; POD2, Tylenol PRN for mild pain, oxycodone for severe pain. Pt will be stable for discharge from Podiatry standpoint pending 48 hours of no grow from Clean bone margin. Plan for discharge tomorrow (8/28).   #IDDM – home regimen: Tresiba 18U qhs, Humalog 6-8U qac TID, Ozempic, metformin 500mg po BID. Currently on lantus 17 and premeal 5TID. FS acceptable. Monitor for now.   #CAD - c/w home regimen of asa, statin, coreg, losartan,   #hypothyroid - TSH WNL, c/w home synthroid dose    Plan discussed with patient and HS.

## 2022-08-27 NOTE — PROGRESS NOTE ADULT - PROBLEM SELECTOR PLAN 1
Subacute pain and discharge L foot Probe to bone on exam on podiatric exam. XR c/f osteomyelitis of left fourth metatarsal head and proximal phalanx. CRP 86, ESR 80. WBC 10.28.   --- Wound culture final: MRSA and ESBL E. coli. Few alpha hemolytic streph and moderate actinomyces.  --- MRI L foot: involvement of 4th MTP, 4th metatarsal head, base of 4th proximal phalanx. No stump osteomyelitis. Reactive osteitis of 3rd metatarsal head.   -- Podiatry: partial 4th resection of left foot 8/25.   - Vanc dosed by trough (1250 mg BID). Ertapenem.   - Per Podiatry, patient will be cleared for d/c if surgical culture is negative after 48 hours.   - Per ID, patient should remain on abx for 48 hours after surgery and then is cleared for d/c without abx if surgical culture is negative.

## 2022-08-27 NOTE — PROGRESS NOTE ADULT - ASSESSMENT
57y Male s/p LEFT foot Partial Fourth Ray Resection, closed (DOS 8/25/22) POD1  - Patient seen and evaluated  - Afebrile , no leukocytosis  - Low concern for residual bone infection, bone was hard at level of resection and appeared vitalized intra-op  - Low concern for viability, adequate intro-op bleeding.   - Sutures intact, skin well coapted, no hematoma expressed, mild heidy-wound erythema within normal post -op course, no pus nor drainage noted, skin flap appears warm, viable with normal cap refill time.   - Clean bone culture: pending  - Clean bone and dirty bone Pathology: pending  - Pt will be stable for discharge from Podiatry standpoint pending 48 hours of no grow from Clean bone margin.   - Discussed with attending.        57y Male s/p LEFT foot Partial Fourth Ray Resection, closed (DOS 8/25/22) POD1  - Patient seen and evaluated  - Afebrile , no leukocytosis  - Low concern for residual bone infection  - Low concern for viability  - Sutures intact, skin well coapted, no hematoma expressed, mild heidy-wound erythema within normal post -op course, no pus nor drainage noted, skin flap appears warm, viable with normal cap refill time.   - Clean bone culture: no growth (prelim )  - Clean bone and dirty bone Pathology: pending  - Pt will be stable for discharge from Podiatry standpoint pending 48 hours of no grow from Clean bone margin.   - Discussed with attending.

## 2022-08-28 ENCOUNTER — TRANSCRIPTION ENCOUNTER (OUTPATIENT)
Age: 57
End: 2022-08-28

## 2022-08-28 LAB
ANION GAP SERPL CALC-SCNC: 11 MMOL/L — SIGNIFICANT CHANGE UP (ref 7–14)
BASOPHILS # BLD AUTO: 0.03 K/UL — SIGNIFICANT CHANGE UP (ref 0–0.2)
BASOPHILS NFR BLD AUTO: 0.4 % — SIGNIFICANT CHANGE UP (ref 0–2)
BUN SERPL-MCNC: 11 MG/DL — SIGNIFICANT CHANGE UP (ref 7–23)
CALCIUM SERPL-MCNC: 9.3 MG/DL — SIGNIFICANT CHANGE UP (ref 8.4–10.5)
CHLORIDE SERPL-SCNC: 102 MMOL/L — SIGNIFICANT CHANGE UP (ref 98–107)
CO2 SERPL-SCNC: 26 MMOL/L — SIGNIFICANT CHANGE UP (ref 22–31)
CREAT SERPL-MCNC: 0.72 MG/DL — SIGNIFICANT CHANGE UP (ref 0.5–1.3)
EGFR: 106 ML/MIN/1.73M2 — SIGNIFICANT CHANGE UP
EOSINOPHIL # BLD AUTO: 0.24 K/UL — SIGNIFICANT CHANGE UP (ref 0–0.5)
EOSINOPHIL NFR BLD AUTO: 2.8 % — SIGNIFICANT CHANGE UP (ref 0–6)
GLUCOSE BLDC GLUCOMTR-MCNC: 104 MG/DL — HIGH (ref 70–99)
GLUCOSE SERPL-MCNC: 87 MG/DL — SIGNIFICANT CHANGE UP (ref 70–99)
HCT VFR BLD CALC: 32.8 % — LOW (ref 39–50)
HGB BLD-MCNC: 10.5 G/DL — LOW (ref 13–17)
IANC: 4.17 K/UL — SIGNIFICANT CHANGE UP (ref 1.8–7.4)
IMM GRANULOCYTES NFR BLD AUTO: 0.4 % — SIGNIFICANT CHANGE UP (ref 0–1.5)
LYMPHOCYTES # BLD AUTO: 2.87 K/UL — SIGNIFICANT CHANGE UP (ref 1–3.3)
LYMPHOCYTES # BLD AUTO: 33.8 % — SIGNIFICANT CHANGE UP (ref 13–44)
MAGNESIUM SERPL-MCNC: 1.7 MG/DL — SIGNIFICANT CHANGE UP (ref 1.6–2.6)
MCHC RBC-ENTMCNC: 29.2 PG — SIGNIFICANT CHANGE UP (ref 27–34)
MCHC RBC-ENTMCNC: 32 GM/DL — SIGNIFICANT CHANGE UP (ref 32–36)
MCV RBC AUTO: 91.1 FL — SIGNIFICANT CHANGE UP (ref 80–100)
MONOCYTES # BLD AUTO: 1.14 K/UL — HIGH (ref 0–0.9)
MONOCYTES NFR BLD AUTO: 13.4 % — SIGNIFICANT CHANGE UP (ref 2–14)
NEUTROPHILS # BLD AUTO: 4.17 K/UL — SIGNIFICANT CHANGE UP (ref 1.8–7.4)
NEUTROPHILS NFR BLD AUTO: 49.2 % — SIGNIFICANT CHANGE UP (ref 43–77)
NRBC # BLD: 0 /100 WBCS — SIGNIFICANT CHANGE UP (ref 0–0)
NRBC # FLD: 0 K/UL — SIGNIFICANT CHANGE UP (ref 0–0)
PHOSPHATE SERPL-MCNC: 3.9 MG/DL — SIGNIFICANT CHANGE UP (ref 2.5–4.5)
PLATELET # BLD AUTO: 441 K/UL — HIGH (ref 150–400)
POTASSIUM SERPL-MCNC: 3.5 MMOL/L — SIGNIFICANT CHANGE UP (ref 3.5–5.3)
POTASSIUM SERPL-SCNC: 3.5 MMOL/L — SIGNIFICANT CHANGE UP (ref 3.5–5.3)
RBC # BLD: 3.6 M/UL — LOW (ref 4.2–5.8)
RBC # FLD: 13.2 % — SIGNIFICANT CHANGE UP (ref 10.3–14.5)
SODIUM SERPL-SCNC: 139 MMOL/L — SIGNIFICANT CHANGE UP (ref 135–145)
WBC # BLD: 8.48 K/UL — SIGNIFICANT CHANGE UP (ref 3.8–10.5)
WBC # FLD AUTO: 8.48 K/UL — SIGNIFICANT CHANGE UP (ref 3.8–10.5)

## 2022-08-28 PROCEDURE — 99239 HOSP IP/OBS DSCHRG MGMT >30: CPT

## 2022-08-28 RX ORDER — GABAPENTIN 400 MG/1
1 CAPSULE ORAL
Qty: 90 | Refills: 0
Start: 2022-08-28 | End: 2022-09-26

## 2022-08-28 RX ORDER — SEMAGLUTIDE 0.68 MG/ML
0.5 INJECTION, SOLUTION SUBCUTANEOUS
Qty: 0 | Refills: 0 | DISCHARGE

## 2022-08-28 RX ORDER — INSULIN LISPRO 100/ML
5 VIAL (ML) SUBCUTANEOUS
Qty: 0 | Refills: 0 | DISCHARGE

## 2022-08-28 RX ORDER — ATORVASTATIN CALCIUM 80 MG/1
1 TABLET, FILM COATED ORAL
Qty: 30 | Refills: 0
Start: 2022-08-28 | End: 2022-09-26

## 2022-08-28 RX ORDER — ATORVASTATIN CALCIUM 80 MG/1
1 TABLET, FILM COATED ORAL
Qty: 0 | Refills: 0 | DISCHARGE

## 2022-08-28 RX ORDER — INSULIN DEGLUDEC 100 U/ML
18 INJECTION, SOLUTION SUBCUTANEOUS
Qty: 0 | Refills: 0 | DISCHARGE

## 2022-08-28 RX ORDER — LOSARTAN POTASSIUM 100 MG/1
1 TABLET, FILM COATED ORAL
Qty: 0 | Refills: 0 | DISCHARGE

## 2022-08-28 RX ORDER — CARVEDILOL PHOSPHATE 80 MG/1
1 CAPSULE, EXTENDED RELEASE ORAL
Qty: 0 | Refills: 0 | DISCHARGE

## 2022-08-28 RX ORDER — FUROSEMIDE 40 MG
1 TABLET ORAL
Qty: 0 | Refills: 0 | DISCHARGE

## 2022-08-28 RX ORDER — FUROSEMIDE 40 MG
1 TABLET ORAL
Qty: 30 | Refills: 0
Start: 2022-08-28 | End: 2022-09-26

## 2022-08-28 RX ORDER — CARVEDILOL PHOSPHATE 80 MG/1
1 CAPSULE, EXTENDED RELEASE ORAL
Qty: 60 | Refills: 0
Start: 2022-08-28 | End: 2022-09-26

## 2022-08-28 RX ORDER — LOSARTAN POTASSIUM 100 MG/1
1 TABLET, FILM COATED ORAL
Qty: 30 | Refills: 0
Start: 2022-08-28 | End: 2022-09-26

## 2022-08-28 RX ORDER — GABAPENTIN 400 MG/1
1 CAPSULE ORAL
Qty: 0 | Refills: 0 | DISCHARGE

## 2022-08-28 RX ORDER — INSULIN LISPRO 100/ML
7 VIAL (ML) SUBCUTANEOUS
Qty: 0 | Refills: 0 | DISCHARGE

## 2022-08-28 RX ORDER — INSULIN DEGLUDEC 100 U/ML
17 INJECTION, SOLUTION SUBCUTANEOUS
Qty: 0 | Refills: 0 | DISCHARGE

## 2022-08-28 RX ADMIN — Medication 1 MILLIGRAM(S): at 11:35

## 2022-08-28 RX ADMIN — GABAPENTIN 100 MILLIGRAM(S): 400 CAPSULE ORAL at 06:13

## 2022-08-28 RX ADMIN — Medication 40 MILLIGRAM(S): at 06:13

## 2022-08-28 RX ADMIN — HEPARIN SODIUM 5000 UNIT(S): 5000 INJECTION INTRAVENOUS; SUBCUTANEOUS at 06:14

## 2022-08-28 RX ADMIN — Medication 975 MILLIGRAM(S): at 06:16

## 2022-08-28 RX ADMIN — Medication 5 UNIT(S): at 08:48

## 2022-08-28 RX ADMIN — CARVEDILOL PHOSPHATE 12.5 MILLIGRAM(S): 80 CAPSULE, EXTENDED RELEASE ORAL at 06:13

## 2022-08-28 RX ADMIN — LOSARTAN POTASSIUM 25 MILLIGRAM(S): 100 TABLET, FILM COATED ORAL at 06:13

## 2022-08-28 RX ADMIN — Medication 81 MILLIGRAM(S): at 11:35

## 2022-08-28 RX ADMIN — AMLODIPINE BESYLATE 5 MILLIGRAM(S): 2.5 TABLET ORAL at 06:13

## 2022-08-28 RX ADMIN — Medication 325 MILLIGRAM(S): at 11:36

## 2022-08-28 RX ADMIN — Medication 100 MICROGRAM(S): at 06:13

## 2022-08-28 NOTE — PROGRESS NOTE ADULT - ASSESSMENT
57y Male s/p LEFT foot Partial Fourth Ray Resection, closed (DOS 8/25/22) POD1  - Patient seen and evaluated  - Afebrile T(F): 98.3, WBC 8.48  - Low concern for residual bone infection  - Low concern for viability  - Sutures intact, skin well coapted, no hematoma expressed, mild heidy-wound erythema within normal post -op course, no pus nor drainage noted, skin flap appears warm, viable with normal cap refill time.   - Clean bone culture: no growth > 48 hours   - Clean bone and dirty bone Pathology: pending  - since Clean bone culture >48 hours, Patient will be stable for discharge.   - follow up info in discharge provider note.   - Discussed with attending.

## 2022-08-28 NOTE — PROGRESS NOTE ADULT - ATTENDING COMMENTS
56yo M with PMH of IDDM with neuropathy, s/p L 5th toe amputation 3/2022 for ?gangrene, HTN, and CAD s/p CABG 7/2022 who presented with 4 days of pain and discharge from the prior L 5th toe amputation site admitted for OM.    Patient stable for discharge, Clean bone margin with no growth x 48hours     #Left 4th metatarsal OM -  Wound cx polymicrobial growing ESBL ecoli and MRSA. C/w vancomycin and ertapenem. s/p LEFT foot Partial Fourth Ray Resection, closed on8/25/22; POD2, Tylenol PRN for mild pain, oxycodone for severe pain.   #IDDM – home regimen: Tresiba 17U qhs, Humalog TID, Ozempic, metformin 500mg po BID. Currently on lantus 17 and premeal 5TID. FS acceptable. Monitor for now.   #CAD - c/w home regimen of asa, statin, coreg, losartan,   #hypothyroid - TSH WNL, c/w home synthroid dose    Plan discussed with patient and HS.

## 2022-08-28 NOTE — PROGRESS NOTE ADULT - PROVIDER SPECIALTY LIST ADULT
Podiatry
Infectious Disease
Podiatry
Internal Medicine

## 2022-08-28 NOTE — PROGRESS NOTE ADULT - PROBLEM SELECTOR PLAN 4
c/w home meds  - monitor sx
c/w home meds  - monitor sx
CABG in July 2022. ECG today shows some t-wave inversions, possibly representing prior ischemic event.  - continue aspirin 81

## 2022-08-28 NOTE — PROGRESS NOTE ADULT - PROBLEM SELECTOR PROBLEM 2
Diabetes mellitus treated with insulin

## 2022-08-28 NOTE — PROGRESS NOTE ADULT - SUBJECTIVE AND OBJECTIVE BOX
Patient is a 57y old  Male who presents with a chief complaint of pain/discharge from left small toe amputation site (28 Aug 2022 07:06)       INTERVAL HPI/OVERNIGHT EVENTS:  Patient seen and evaluated at bedside.  Pt is resting comfortable in NAD. Denies N/V/F/C.    Allergies    No Known Allergies    Intolerances        Vital Signs Last 24 Hrs  T(C): 36.8 (27 Aug 2022 20:52), Max: 36.9 (27 Aug 2022 12:46)  T(F): 98.3 (27 Aug 2022 20:52), Max: 98.4 (27 Aug 2022 12:46)  HR: 78 (27 Aug 2022 20:52) (74 - 81)  BP: 139/72 (27 Aug 2022 20:52) (139/72 - 153/84)  BP(mean): --  RR: 18 (27 Aug 2022 20:52) (18 - 18)  SpO2: 99% (27 Aug 2022 20:52) (98% - 99%)    Parameters below as of 27 Aug 2022 20:52  Patient On (Oxygen Delivery Method): room air        LABS:                        10.5   8.48  )-----------( 441      ( 28 Aug 2022 05:29 )             32.8     08-28    139  |  102  |  11  ----------------------------<  87  3.5   |  26  |  0.72    Ca    9.3      28 Aug 2022 05:29  Phos  3.9     08-28  Mg     1.70     08-28          CAPILLARY BLOOD GLUCOSE      POCT Blood Glucose.: 104 mg/dL (28 Aug 2022 08:42)  POCT Blood Glucose.: 277 mg/dL (27 Aug 2022 21:59)  POCT Blood Glucose.: 266 mg/dL (27 Aug 2022 20:46)  POCT Blood Glucose.: 172 mg/dL (27 Aug 2022 17:44)  POCT Blood Glucose.: 166 mg/dL (27 Aug 2022 12:44)      Lower Extremity Physical Exam:  ascular: DP/PT 2/4 B/L, CFT <3sec x 10, Temp gradient warm to warm of left foot, warm to cool of Right foot.    Neurology: Epicritic sensation decreased to level of forefoot, B/L  Musculoskeletal/Ortho:   Skin:  - s/p LEFT foot Partial Fourth Ray Resection, closed (DOS 8/25/22)   - Sutures intact, skin well coapted, no hematoma expressed, mild heidy-wound erythema within normal post -op course, no pus nor drainage noted, skin flap appears warm, viable with normal cap refill time.       RADIOLOGY & ADDITIONAL TESTS:

## 2022-08-28 NOTE — PROGRESS NOTE ADULT - PROBLEM SELECTOR PROBLEM 4
S/P CABG (coronary artery bypass graft)

## 2022-08-28 NOTE — PROGRESS NOTE ADULT - REASON FOR ADMISSION
pain/discharge from left small toe amputation site

## 2022-08-28 NOTE — DISCHARGE NOTE NURSING/CASE MANAGEMENT/SOCIAL WORK - PATIENT PORTAL LINK FT
You can access the FollowMyHealth Patient Portal offered by [facility] by registering at the following website: [email or web address]. By joining [email or web address]’s FollowMyHealth portal, you will also be able to view your health information using other applications (apps) compatible with our system.

## 2022-08-28 NOTE — PROGRESS NOTE ADULT - SUBJECTIVE AND OBJECTIVE BOX
PROGRESS NOTE:   Authored by Arsh Middleton MD   Patient is a 57y old  Male who presents with a chief complaint of pain/discharge from left small toe amputation site (27 Aug 2022 10:58)      SUBJECTIVE / OVERNIGHT EVENTS: No acute events overnight. No chest pain,  palpitations, dyspnea, abdominal pain. nausea, vomiting, diarrhea, blurry vision, dysuria, lightheadedness, dizziness.    ADDITIONAL REVIEW OF SYSTEMS:    MEDICATIONS  (STANDING):  amLODIPine   Tablet 5 milliGRAM(s) Oral daily  aspirin  chewable 81 milliGRAM(s) Oral daily  atorvastatin 80 milliGRAM(s) Oral at bedtime  carvedilol 12.5 milliGRAM(s) Oral every 12 hours  dextrose 5%. 1000 milliLiter(s) (100 mL/Hr) IV Continuous <Continuous>  dextrose 5%. 1000 milliLiter(s) (50 mL/Hr) IV Continuous <Continuous>  dextrose 50% Injectable 25 Gram(s) IV Push once  dextrose 50% Injectable 12.5 Gram(s) IV Push once  dextrose 50% Injectable 25 Gram(s) IV Push once  ferrous    sulfate 325 milliGRAM(s) Oral daily  folic acid 1 milliGRAM(s) Oral daily  furosemide    Tablet 40 milliGRAM(s) Oral daily  gabapentin 100 milliGRAM(s) Oral three times a day  glucagon  Injectable 1 milliGRAM(s) IntraMuscular once  heparin   Injectable 5000 Unit(s) SubCutaneous every 8 hours  insulin glargine Injectable (LANTUS) 17 Unit(s) SubCutaneous at bedtime  insulin lispro (ADMELOG) corrective regimen sliding scale   SubCutaneous three times a day before meals  insulin lispro (ADMELOG) corrective regimen sliding scale   SubCutaneous at bedtime  insulin lispro Injectable (ADMELOG) 5 Unit(s) SubCutaneous three times a day before meals  levothyroxine 100 MICROGram(s) Oral daily  losartan 25 milliGRAM(s) Oral daily  senna 2 Tablet(s) Oral at bedtime    MEDICATIONS  (PRN):  dextrose Oral Gel 15 Gram(s) Oral once PRN Blood Glucose LESS THAN 70 milliGRAM(s)/deciliter  oxyCODONE    IR 5 milliGRAM(s) Oral every 6 hours PRN Severe Pain (7 - 10)      CAPILLARY BLOOD GLUCOSE      POCT Blood Glucose.: 277 mg/dL (27 Aug 2022 21:59)  POCT Blood Glucose.: 266 mg/dL (27 Aug 2022 20:46)  POCT Blood Glucose.: 172 mg/dL (27 Aug 2022 17:44)  POCT Blood Glucose.: 166 mg/dL (27 Aug 2022 12:44)  POCT Blood Glucose.: 141 mg/dL (27 Aug 2022 08:47)    I&O's Summary      PHYSICAL EXAM:  Vital Signs Last 24 Hrs  T(C): 36.8 (27 Aug 2022 20:52), Max: 36.9 (27 Aug 2022 12:46)  T(F): 98.3 (27 Aug 2022 20:52), Max: 98.4 (27 Aug 2022 12:46)  HR: 78 (27 Aug 2022 20:52) (74 - 81)  BP: 139/72 (27 Aug 2022 20:52) (139/72 - 153/84)  BP(mean): --  RR: 18 (27 Aug 2022 20:52) (18 - 18)  SpO2: 99% (27 Aug 2022 20:52) (98% - 99%)    Parameters below as of 27 Aug 2022 20:52  Patient On (Oxygen Delivery Method): room air        GENERAL: No apparent distress.   HEAD:  Atraumatic, Normocephalic  EYES: EOMI, PERRLA, conjunctiva and sclera clear  NECK: Supple, no lymphadenopathy, no JVD  CHEST/LUNG: Clear to auscultation bilateral and symmetric; No wheezes, rales, or rhonchi  HEART: S1 and S2 normal. Regular rate and rhythm; No murmurs, rubs, or gallops  ABDOMEN: Soft, non-tender, non-distended; normal bowel sounds  EXTREMITIES:  2+ peripheral pulses b/l, No clubbing, cyanosis, or edema  NEUROLOGY: A&O x 3, no focal deficits  SKIN: No rashes or lesions    LABS:                        10.5   8.48  )-----------( 441      ( 28 Aug 2022 05:29 )             32.8     08-28    139  |  102  |  11  ----------------------------<  87  3.5   |  26  |  0.72    Ca    9.3      28 Aug 2022 05:29  Phos  3.9     08-28  Mg     1.70     08-28                Culture - Acid Fast - Tissue w/Smear (collected 25 Aug 2022 14:04)  Source: .Tissue CLEAN BONE LEFT FOOT  Preliminary Report (27 Aug 2022 15:04):    Culture is being performed.    Culture - Fungal, Tissue (collected 25 Aug 2022 14:04)  Source: .Tissue CLEAN BONE LEFT FOOT  Preliminary Report (26 Aug 2022 07:41):    Testing in progress    Culture - Tissue with Gram Stain (collected 25 Aug 2022 14:04)  Source: .Tissue CLEAN BONE - LEFT FOOT  Gram Stain (25 Aug 2022 23:51):    No polymorphonuclear leukocytes seen per low power field    No organisms seen per oil power field  Preliminary Report (26 Aug 2022 15:15):    No growth        RADIOLOGY & ADDITIONAL TESTS:  Lab Results Reviewed   Imaging Reviewed  Electrocardiogram Reviewed   PROGRESS NOTE:   Authored by Arsh Middleton MD   Patient is a 57y old  Male who presents with a chief complaint of pain/discharge from left small toe amputation site (27 Aug 2022 10:58)      SUBJECTIVE / OVERNIGHT EVENTS: No acute events overnight. No chest pain,  palpitations, dyspnea, abdominal pain. nausea, vomiting, diarrhea, blurry vision, dysuria, lightheadedness, dizziness.    ADDITIONAL REVIEW OF SYSTEMS:    MEDICATIONS  (STANDING):  amLODIPine   Tablet 5 milliGRAM(s) Oral daily  aspirin  chewable 81 milliGRAM(s) Oral daily  atorvastatin 80 milliGRAM(s) Oral at bedtime  carvedilol 12.5 milliGRAM(s) Oral every 12 hours  dextrose 5%. 1000 milliLiter(s) (100 mL/Hr) IV Continuous <Continuous>  dextrose 5%. 1000 milliLiter(s) (50 mL/Hr) IV Continuous <Continuous>  dextrose 50% Injectable 25 Gram(s) IV Push once  dextrose 50% Injectable 12.5 Gram(s) IV Push once  dextrose 50% Injectable 25 Gram(s) IV Push once  ferrous    sulfate 325 milliGRAM(s) Oral daily  folic acid 1 milliGRAM(s) Oral daily  furosemide    Tablet 40 milliGRAM(s) Oral daily  gabapentin 100 milliGRAM(s) Oral three times a day  glucagon  Injectable 1 milliGRAM(s) IntraMuscular once  heparin   Injectable 5000 Unit(s) SubCutaneous every 8 hours  insulin glargine Injectable (LANTUS) 17 Unit(s) SubCutaneous at bedtime  insulin lispro (ADMELOG) corrective regimen sliding scale   SubCutaneous three times a day before meals  insulin lispro (ADMELOG) corrective regimen sliding scale   SubCutaneous at bedtime  insulin lispro Injectable (ADMELOG) 5 Unit(s) SubCutaneous three times a day before meals  levothyroxine 100 MICROGram(s) Oral daily  losartan 25 milliGRAM(s) Oral daily  senna 2 Tablet(s) Oral at bedtime    MEDICATIONS  (PRN):  dextrose Oral Gel 15 Gram(s) Oral once PRN Blood Glucose LESS THAN 70 milliGRAM(s)/deciliter  oxyCODONE    IR 5 milliGRAM(s) Oral every 6 hours PRN Severe Pain (7 - 10)      CAPILLARY BLOOD GLUCOSE      POCT Blood Glucose.: 277 mg/dL (27 Aug 2022 21:59)  POCT Blood Glucose.: 266 mg/dL (27 Aug 2022 20:46)  POCT Blood Glucose.: 172 mg/dL (27 Aug 2022 17:44)  POCT Blood Glucose.: 166 mg/dL (27 Aug 2022 12:44)  POCT Blood Glucose.: 141 mg/dL (27 Aug 2022 08:47)    I&O's Summary      PHYSICAL EXAM:  Vital Signs Last 24 Hrs  T(C): 36.8 (27 Aug 2022 20:52), Max: 36.9 (27 Aug 2022 12:46)  T(F): 98.3 (27 Aug 2022 20:52), Max: 98.4 (27 Aug 2022 12:46)  HR: 78 (27 Aug 2022 20:52) (74 - 81)  BP: 139/72 (27 Aug 2022 20:52) (139/72 - 153/84)  BP(mean): --  RR: 18 (27 Aug 2022 20:52) (18 - 18)  SpO2: 99% (27 Aug 2022 20:52) (98% - 99%)    Parameters below as of 27 Aug 2022 20:52  Patient On (Oxygen Delivery Method): room air        GENERAL: No apparent distress.   HEAD:  Atraumatic, Normocephalic  EYES: EOMI, PERRLA, conjunctiva and sclera clear  NECK: Supple, no lymphadenopathy, no JVD  CHEST/LUNG: Clear to auscultation bilateral and symmetric; No wheezes, rales, or rhonchi  HEART: S1 and S2 normal. Regular rate and rhythm; No murmurs, rubs, or gallops  ABDOMEN: Soft, non-tender, non-distended; normal bowel sounds  EXTREMITIES:  2+ peripheral pulses b/l, No clubbing, cyanosis, or edema. Dressing clean and intact over left foot.   NEUROLOGY: A&O x 3, no focal deficits  SKIN: No rashes or lesions    LABS:                        10.5   8.48  )-----------( 441      ( 28 Aug 2022 05:29 )             32.8     08-28    139  |  102  |  11  ----------------------------<  87  3.5   |  26  |  0.72    Ca    9.3      28 Aug 2022 05:29  Phos  3.9     08-28  Mg     1.70     08-28                Culture - Acid Fast - Tissue w/Smear (collected 25 Aug 2022 14:04)  Source: .Tissue CLEAN BONE LEFT FOOT  Preliminary Report (27 Aug 2022 15:04):    Culture is being performed.    Culture - Fungal, Tissue (collected 25 Aug 2022 14:04)  Source: .Tissue CLEAN BONE LEFT FOOT  Preliminary Report (26 Aug 2022 07:41):    Testing in progress    Culture - Tissue with Gram Stain (collected 25 Aug 2022 14:04)  Source: .Tissue CLEAN BONE - LEFT FOOT  Gram Stain (25 Aug 2022 23:51):    No polymorphonuclear leukocytes seen per low power field    No organisms seen per oil power field  Preliminary Report (26 Aug 2022 15:15):    No growth        RADIOLOGY & ADDITIONAL TESTS:  Lab Results Reviewed   Imaging Reviewed  Electrocardiogram Reviewed

## 2022-08-28 NOTE — DISCHARGE NOTE NURSING/CASE MANAGEMENT/SOCIAL WORK - NSDCFUADDAPPT_GEN_ALL_CORE_FT
Podiatry Discharge Instructions:  Follow up: Please follow up with Dr. Key within 1 week of discharge from the hospital, please call [phone]  for appointment and discuss that you recently were seen in the hospital.  Wound Care: Please leave your dressing clean dry intact until your follow up appointment     Weight bearing: Please weight bear as tolerated in a surgical shoe to Left foot.   Antibiotics: Please continue as instructed.

## 2022-08-28 NOTE — PROGRESS NOTE ADULT - PROBLEM SELECTOR PROBLEM 1
Acute osteomyelitis of metatarsal bone of left foot

## 2022-08-30 LAB
CULTURE RESULTS: SIGNIFICANT CHANGE UP
SPECIMEN SOURCE: SIGNIFICANT CHANGE UP
SURGICAL PATHOLOGY STUDY: SIGNIFICANT CHANGE UP

## 2022-09-01 RX ORDER — ATORVASTATIN CALCIUM 80 MG/1
1 TABLET, FILM COATED ORAL
Qty: 30 | Refills: 0
Start: 2022-09-01

## 2022-09-01 RX ORDER — FOLIC ACID 0.8 MG
1 TABLET ORAL
Qty: 30 | Refills: 0
Start: 2022-09-01

## 2022-09-01 RX ORDER — FERROUS SULFATE 325(65) MG
1 TABLET ORAL
Qty: 30 | Refills: 0
Start: 2022-09-01

## 2022-09-24 LAB
CULTURE RESULTS: SIGNIFICANT CHANGE UP
SPECIMEN SOURCE: SIGNIFICANT CHANGE UP

## 2022-10-14 NOTE — ED ADULT NURSE NOTE - NURSING MUSC STRENGTH
72-year-old male with a history of CAD with prior PCI x2 in 2005 and again in 2013, GERD, asthma, hypertension, BPH admitted with chest pain.  He underwent left heart catheterization on 10/13 which showed multivessel CAD.  CT surgery was consulted who recommended CABG.   hand grasp, leg strength strong and equal bilaterally

## 2022-10-15 LAB
CULTURE RESULTS: SIGNIFICANT CHANGE UP
SPECIMEN SOURCE: SIGNIFICANT CHANGE UP

## 2022-11-23 NOTE — ED ADULT NURSE NOTE - NS ED NURSE LEVEL OF CONSCIOUSNESS AFFECT
Rosalio,    Here are your most recent results from your clinic visit.    Colonoscopy results show benign polyps and recommend a 5 year follow up for repeat colonoscopy to monitor them    If you have any questions feel free to call the clinic at [phone].    Thank you,    Cale Michel MD     Calm

## 2023-06-26 NOTE — PHYSICAL THERAPY INITIAL EVALUATION ADULT - ASSISTIVE DEVICE FOR TRANSFER: SIT/STAND, REHAB EVAL
rolling walker Olumiant Pregnancy And Lactation Text: Based on animal studies, Olumiant may cause embryo-fetal harm when administered to pregnant women.  The medication should not be used in pregnancy.  Breastfeeding is not recommended during treatment.

## 2023-11-30 NOTE — BRIEF OPERATIVE NOTE - OPERATION/FINDINGS
Order placed in system for hinged knee brace.  Patient will follow up with Carisa Hester to be fit in the near future s/p LEFT foot Partial Fourth Ray Resection : at proximal resection bone was of good quality, no evidence of purulence or infection tracking proximally. Incision primarily closed

## 2023-12-31 ENCOUNTER — EMERGENCY (EMERGENCY)
Facility: HOSPITAL | Age: 58
LOS: 1 days | Discharge: HOME | End: 2023-12-31
Attending: EMERGENCY MEDICINE | Admitting: EMERGENCY MEDICINE
Payer: COMMERCIAL

## 2023-12-31 VITALS
HEART RATE: 88 BPM | OXYGEN SATURATION: 99 % | RESPIRATION RATE: 20 BRPM | TEMPERATURE: 99 F | DIASTOLIC BLOOD PRESSURE: 70 MMHG | SYSTOLIC BLOOD PRESSURE: 142 MMHG

## 2023-12-31 VITALS
DIASTOLIC BLOOD PRESSURE: 79 MMHG | TEMPERATURE: 100 F | HEART RATE: 100 BPM | RESPIRATION RATE: 22 BRPM | OXYGEN SATURATION: 95 % | SYSTOLIC BLOOD PRESSURE: 132 MMHG

## 2023-12-31 DIAGNOSIS — Z89.422 ACQUIRED ABSENCE OF OTHER LEFT TOE(S): Chronic | ICD-10-CM

## 2023-12-31 DIAGNOSIS — Z95.1 PRESENCE OF AORTOCORONARY BYPASS GRAFT: Chronic | ICD-10-CM

## 2023-12-31 PROBLEM — E11.9 TYPE 2 DIABETES MELLITUS WITHOUT COMPLICATIONS: Chronic | Status: ACTIVE | Noted: 2021-01-24

## 2023-12-31 PROBLEM — I25.10 ATHEROSCLEROTIC HEART DISEASE OF NATIVE CORONARY ARTERY WITHOUT ANGINA PECTORIS: Chronic | Status: ACTIVE | Noted: 2022-08-21

## 2023-12-31 LAB
FLUAV AG NPH QL: DETECTED
FLUAV AG NPH QL: DETECTED
FLUBV AG NPH QL: SIGNIFICANT CHANGE UP
FLUBV AG NPH QL: SIGNIFICANT CHANGE UP
RSV RNA NPH QL NAA+NON-PROBE: SIGNIFICANT CHANGE UP
RSV RNA NPH QL NAA+NON-PROBE: SIGNIFICANT CHANGE UP
SARS-COV-2 RNA SPEC QL NAA+PROBE: SIGNIFICANT CHANGE UP
SARS-COV-2 RNA SPEC QL NAA+PROBE: SIGNIFICANT CHANGE UP

## 2023-12-31 PROCEDURE — 99284 EMERGENCY DEPT VISIT MOD MDM: CPT

## 2023-12-31 PROCEDURE — 93010 ELECTROCARDIOGRAM REPORT: CPT

## 2023-12-31 PROCEDURE — 71046 X-RAY EXAM CHEST 2 VIEWS: CPT | Mod: 26

## 2023-12-31 RX ORDER — ACETAMINOPHEN 500 MG
975 TABLET ORAL ONCE
Refills: 0 | Status: COMPLETED | OUTPATIENT
Start: 2023-12-31 | End: 2023-12-31

## 2023-12-31 RX ADMIN — Medication 975 MILLIGRAM(S): at 11:27

## 2023-12-31 NOTE — ED PROVIDER NOTE - PATIENT PORTAL LINK FT
You can access the FollowMyHealth Patient Portal offered by [facility] by registering at the following website: [email or web address]. By joining [employer]’s FollowMyHealth portal, you will also be able to view your health information using other applications (apps) compatible with our system. You can access the FollowMyHealth Patient Portal offered by [facility] by registering at the following website: [email or web address]. By joining [employer]’s FollowMyHealth portal, you will also be able to view your health information using other applications (apps) compatible with our system.

## 2023-12-31 NOTE — ED PROVIDER NOTE - ED STEMI HIDDEN
Antibiotic ointment applied with nonadherent dressing. Wrapped with gauze. Father instructed on when to change dressing next.   hide

## 2023-12-31 NOTE — ED PROVIDER NOTE - OBJECTIVE STATEMENT
57 y/o male w/ PMH IDDM c/b diabetic neuropathy (L 5th toe amputation March 2022), HLD, HTN, CAD s/p CABG (July 2022) c/o  3-day history of cough with clear sputum.  Patient states that his cough is worse during the night, prompting ED arrival.  Patient is otherwise asymptomatic. Denies fevers, chills, nausea, vomiting, dizziness, chest pain, SOB, abdominal pain, dysuria, hematuria.

## 2023-12-31 NOTE — ED ADULT NURSE NOTE - OBJECTIVE STATEMENT
Received pt to Rm 6 from home with c/o cough and sore throat x3 days. Pt reports cough worse during the night. Pt denies abdominal pain, nausea, vomiting, chest pain or difficulty breathing. Pt reports hx of HTN, HLD, and DM. Pt medicated as ordered. Pt pending CXR. Will continue to monitor.

## 2023-12-31 NOTE — ED PROVIDER NOTE - CLINICAL SUMMARY MEDICAL DECISION MAKING FREE TEXT BOX
59 y/o male w/ PMH IDDM c/b diabetic neuropathy (L 5th toe amputation March 2022), HLD, HTN, CAD s/p CABG (July 2022) c/o  3-day history of cough with clear sputum.  Patient states that his cough is worse during the night, prompting ED arrival.  Patient is otherwise asymptomatic. Denies fevers, chills, nausea, vomiting, dizziness, chest pain, SOB, abdominal pain, dysuria, hematuria. Benign physical examination. Borderline febrile, but otherwise saturating well on room air. The patient is well appearing, moist oral mucosa (low concern for dehydration). Pt's symptoms are consistent with URI. Will obtain CXR to screen for PNA. Will provide supportive management w/ likely d/c w/ close PCP f/u. 57 y/o male w/ PMH IDDM c/b diabetic neuropathy (L 5th toe amputation March 2022), HLD, HTN, CAD s/p CABG (July 2022) c/o  3-day history of cough with clear sputum.  Patient states that his cough is worse during the night, prompting ED arrival.  Patient is otherwise asymptomatic. Denies fevers, chills, nausea, vomiting, dizziness, chest pain, SOB, abdominal pain, dysuria, hematuria. Benign physical examination. Borderline febrile, but otherwise saturating well on room air. The patient is well appearing, moist oral mucosa (low concern for dehydration). Pt's symptoms are consistent with URI. Will obtain CXR to screen for PNA. Will provide supportive management w/ likely d/c w/ close PCP f/u. 59 y/o male w/ PMH IDDM c/b diabetic neuropathy (L 5th toe amputation March 2022), HLD, HTN, CAD s/p CABG (July 2022) c/o  3-day history of cough with clear sputum.  Patient states that his cough is worse during the night, prompting ED arrival.  Patient is otherwise asymptomatic. Denies fevers, chills, nausea, vomiting, dizziness, chest pain, SOB, abdominal pain, dysuria, hematuria. Benign physical examination. Borderline febrile, but otherwise saturating well on room air. The patient is well appearing, moist oral mucosa (low concern for dehydration). Pt's symptoms are consistent with URI. Will obtain CXR to screen for PNA. Will provide supportive management w/ likely d/c w/ close PCP f/u.  Savage: Patient is a 58-year-old diabetic with previous heart disease and hypertension status post CABG has had a cough for the last 3 days.  Patient had URI symptoms that have resolved and the cough became drier but the cough now is keeping him up at night.  Patient not particularly short of breath.  Patient's oxygen saturation here 98%.  Patient lungs sound clear.  Would get a chest x-ray evaluate his diabetes and treat his cough which is his #1 complaint. Lab studies ordered, independently reviewed and acted on as appropriate.

## 2023-12-31 NOTE — ED PROVIDER NOTE - PROGRESS NOTE DETAILS
Carlos A CLAY: Pt is stable and ready for discharge. Strict return precautions given. All questions answered. Pt is no longer tachypneic. Understands his flu diagnosis.

## 2023-12-31 NOTE — ED ADULT NURSE NOTE - NSFALLUNIVINTERV_ED_ALL_ED
Bed/Stretcher in lowest position, wheels locked, appropriate side rails in place/Call bell, personal items and telephone in reach/Instruct patient to call for assistance before getting out of bed/chair/stretcher/Non-slip footwear applied when patient is off stretcher/[place] to call system/Physically safe environment - no spills, clutter or unnecessary equipment/Purposeful proactive rounding/Room/bathroom lighting operational, light cord in reach Bed/Stretcher in lowest position, wheels locked, appropriate side rails in place/Call bell, personal items and telephone in reach/Instruct patient to call for assistance before getting out of bed/chair/stretcher/Non-slip footwear applied when patient is off stretcher/[place] to call system/Physically safe environment - no spills, clutter or unnecessary equipment/Purposeful proactive rounding/Room/bathroom lighting operational, light cord in reach

## 2023-12-31 NOTE — ED PROVIDER NOTE - NSFOLLOWUPINSTRUCTIONS_ED_ALL_ED_FT
Cough    Coughing is a reflex that clears your throat and your airways. Coughing helps to heal and protect your lungs. It is normal to cough occasionally, but a cough that happens with other symptoms or lasts a long time may be a sign of a condition that needs treatment. Coughing may be caused by infections, asthma or COPD, smoking, postnasal drip, gastroesophageal reflux, as well as other medical conditions. Take medicines only as instructed by your health care provider. Avoid environments or triggers that causes you to cough at work or at home.    SEEK IMMEDIATE MEDICAL CARE IF YOU HAVE ANY OF THE FOLLOWING SYMPTOMS: coughing up blood, shortness of breath, rapid heart rate, chest pain, unexplained weight loss or night sweats.    The results of any blood tests and imaging studies completed during your visit today were discussed and explained to you and a copy provided with your discharge instructions. Please follow up with your primary care doctor within 48 hours. Cough    Coughing is a reflex that clears your throat and your airways. Coughing helps to heal and protect your lungs. It is normal to cough occasionally, but a cough that happens with other symptoms or lasts a long time may be a sign of a condition that needs treatment. Coughing may be caused by infections, asthma or COPD, smoking, postnasal drip, gastroesophageal reflux, as well as other medical conditions. Take medicines only as instructed by your health care provider. Avoid environments or triggers that causes you to cough at work or at home.    SEEK IMMEDIATE MEDICAL CARE IF YOU HAVE ANY OF THE FOLLOWING SYMPTOMS: coughing up blood, shortness of breath, rapid heart rate, chest pain, unexplained weight loss or night sweats.    You have influenza.     The results of any blood tests and imaging studies completed during your visit today were discussed and explained to you and a copy provided with your discharge instructions. Please follow up with your primary care doctor within 48 hours.

## 2023-12-31 NOTE — ED ADULT TRIAGE NOTE - CHIEF COMPLAINT QUOTE
Pt presenting for dry cough, headache and toothache starting 3 days ago. Pt pale and diaphoretic in triage, + Low grade fever in triage. Denies cp/trouble breathing

## 2025-03-06 NOTE — PROGRESS NOTE ADULT - PROBLEM/PLAN-2
DISPLAY PLAN FREE TEXT
Render Note In Bullet Format When Appropriate: No
DISPLAY PLAN FREE TEXT
Detail Level: Detailed
Post-Care Instructions: I reviewed with the patient in detail post-care instructions. Patient is to wear sunprotection, and avoid picking at any of the treated lesions. Pt may apply Vaseline to crusted or scabbing areas.
Consent: The patient's consent was obtained including but not limited to risks of crusting, scabbing, blistering, scarring, darker or lighter pigmentary change, recurrence, incomplete removal and infection.
Application Tool (Optional): Liquid Nitrogen Sprayer
DISPLAY PLAN FREE TEXT
Show Applicator Variable?: Yes
Duration Of Freeze Thaw-Cycle (Seconds): 2
DISPLAY PLAN FREE TEXT
DISPLAY PLAN FREE TEXT

## (undated) DEVICE — ELCTR BOVIE TIP BLADE INSULATED 2.8" EDGE WITH SAFETY

## (undated) DEVICE — DRSG KLING 4"

## (undated) DEVICE — WARMING BLANKET UPPER ADULT

## (undated) DEVICE — TOURNIQUET ESMARK 6"

## (undated) DEVICE — HANDPIECE IRRIGATION W/ BATTERY PACKAND HIGH FLOW TIP

## (undated) DEVICE — TOURNIQUET CUFF 18" DUAL PORT DUAL BLADDER W PLC (BLACK)

## (undated) DEVICE — BLADE SURGICAL #15 CARBON

## (undated) DEVICE — DRSG STOCKINETTE TUBULAR 6"

## (undated) DEVICE — LABELS BLANK W PEN

## (undated) DEVICE — SUT ETHILON 4-0 18" PS-2

## (undated) DEVICE — PACK LIJ BASIC ORTHO

## (undated) DEVICE — SYR LUER LOK 10CC

## (undated) DEVICE — DRSG STOCKINETTE IMPERVIOUS XL

## (undated) DEVICE — DRSG KERLIX ROLL 4.5"

## (undated) DEVICE — FRAZIER SUCTION TIP 8FR

## (undated) DEVICE — PREP CHLORAPREP HI-LITE ORANGE 26ML

## (undated) DEVICE — CANISTER DISPOSABLE THIN WALL 3000CC

## (undated) DEVICE — SUT VICRYL 3-0 18" PS-2 UNDYED

## (undated) DEVICE — DRSG XEROFORM 1 X 8"

## (undated) DEVICE — ELCTR GROUNDING PAD ADULT COVIDIEN

## (undated) DEVICE — DRSG CURITY GAUZE SPONGE 4 X 4" 12-PLY

## (undated) DEVICE — SUT VICRYL 4-0 18" PS-2 UNDYED

## (undated) DEVICE — DRSG STERISTRIPS 0.25 X 3"

## (undated) DEVICE — PACKING GAUZE PLAIN 2"

## (undated) DEVICE — NDL HYPO REGULAR BEVEL 25G X 1.5" (BLUE)

## (undated) DEVICE — VENODYNE/SCD SLEEVE CALF MEDIUM

## (undated) DEVICE — DRSG ACE BANDAGE 4" NS